# Patient Record
Sex: FEMALE | Race: WHITE | Employment: OTHER | URBAN - NONMETROPOLITAN AREA
[De-identification: names, ages, dates, MRNs, and addresses within clinical notes are randomized per-mention and may not be internally consistent; named-entity substitution may affect disease eponyms.]

---

## 2017-01-17 ENCOUNTER — OFFICE VISIT (OUTPATIENT)
Dept: FAMILY MEDICINE CLINIC | Age: 72
End: 2017-01-17

## 2017-01-17 VITALS
SYSTOLIC BLOOD PRESSURE: 126 MMHG | HEART RATE: 56 BPM | OXYGEN SATURATION: 97 % | WEIGHT: 247 LBS | DIASTOLIC BLOOD PRESSURE: 82 MMHG | TEMPERATURE: 98 F | HEIGHT: 65 IN | BODY MASS INDEX: 41.15 KG/M2

## 2017-01-17 DIAGNOSIS — R25.1 TREMOR, UNSPECIFIED: ICD-10-CM

## 2017-01-17 DIAGNOSIS — E66.01 MORBID OBESITY WITH BMI OF 40.0-44.9, ADULT (HCC): ICD-10-CM

## 2017-01-17 DIAGNOSIS — M79.602 LEFT ARM PAIN: Primary | ICD-10-CM

## 2017-01-17 PROCEDURE — 4040F PNEUMOC VAC/ADMIN/RCVD: CPT | Performed by: FAMILY MEDICINE

## 2017-01-17 PROCEDURE — G8484 FLU IMMUNIZE NO ADMIN: HCPCS | Performed by: FAMILY MEDICINE

## 2017-01-17 PROCEDURE — G8427 DOCREV CUR MEDS BY ELIG CLIN: HCPCS | Performed by: FAMILY MEDICINE

## 2017-01-17 PROCEDURE — 3014F SCREEN MAMMO DOC REV: CPT | Performed by: FAMILY MEDICINE

## 2017-01-17 PROCEDURE — 1090F PRES/ABSN URINE INCON ASSESS: CPT | Performed by: FAMILY MEDICINE

## 2017-01-17 PROCEDURE — 1123F ACP DISCUSS/DSCN MKR DOCD: CPT | Performed by: FAMILY MEDICINE

## 2017-01-17 PROCEDURE — G8400 PT W/DXA NO RESULTS DOC: HCPCS | Performed by: FAMILY MEDICINE

## 2017-01-17 PROCEDURE — 93000 ELECTROCARDIOGRAM COMPLETE: CPT | Performed by: FAMILY MEDICINE

## 2017-01-17 PROCEDURE — G8419 CALC BMI OUT NRM PARAM NOF/U: HCPCS | Performed by: FAMILY MEDICINE

## 2017-01-17 PROCEDURE — 3017F COLORECTAL CA SCREEN DOC REV: CPT | Performed by: FAMILY MEDICINE

## 2017-01-17 PROCEDURE — 1036F TOBACCO NON-USER: CPT | Performed by: FAMILY MEDICINE

## 2017-01-17 PROCEDURE — 99214 OFFICE O/P EST MOD 30 MIN: CPT | Performed by: FAMILY MEDICINE

## 2017-01-17 ASSESSMENT — ENCOUNTER SYMPTOMS
CONSTIPATION: 0
COUGH: 0
WHEEZING: 0
BACK PAIN: 0
ABDOMINAL PAIN: 0
BLOOD IN STOOL: 0
NAUSEA: 0
VOMITING: 0
EYE PAIN: 0
CHEST TIGHTNESS: 0
RHINORRHEA: 0
SHORTNESS OF BREATH: 0
DIARRHEA: 0
SORE THROAT: 0

## 2017-03-08 ENCOUNTER — OFFICE VISIT (OUTPATIENT)
Dept: FAMILY MEDICINE CLINIC | Age: 72
End: 2017-03-08

## 2017-03-08 VITALS
HEART RATE: 64 BPM | SYSTOLIC BLOOD PRESSURE: 134 MMHG | OXYGEN SATURATION: 96 % | BODY MASS INDEX: 41.42 KG/M2 | DIASTOLIC BLOOD PRESSURE: 86 MMHG | WEIGHT: 248.6 LBS | HEIGHT: 65 IN | RESPIRATION RATE: 12 BRPM

## 2017-03-08 DIAGNOSIS — R20.2 NUMBNESS AND TINGLING OF LEFT LEG: ICD-10-CM

## 2017-03-08 DIAGNOSIS — M15.9 GENERALIZED OA: ICD-10-CM

## 2017-03-08 DIAGNOSIS — R20.2 NUMBNESS AND TINGLING IN RIGHT HAND: Primary | ICD-10-CM

## 2017-03-08 DIAGNOSIS — R20.0 NUMBNESS AND TINGLING IN RIGHT HAND: Primary | ICD-10-CM

## 2017-03-08 DIAGNOSIS — R20.0 NUMBNESS AND TINGLING OF LEFT LEG: ICD-10-CM

## 2017-03-08 DIAGNOSIS — Z12.11 SCREEN FOR COLON CANCER: ICD-10-CM

## 2017-03-08 DIAGNOSIS — E66.01 MORBID OBESITY WITH BMI OF 40.0-44.9, ADULT (HCC): ICD-10-CM

## 2017-03-08 PROCEDURE — 1090F PRES/ABSN URINE INCON ASSESS: CPT | Performed by: FAMILY MEDICINE

## 2017-03-08 PROCEDURE — 3017F COLORECTAL CA SCREEN DOC REV: CPT | Performed by: FAMILY MEDICINE

## 2017-03-08 PROCEDURE — 99214 OFFICE O/P EST MOD 30 MIN: CPT | Performed by: FAMILY MEDICINE

## 2017-03-08 PROCEDURE — 4040F PNEUMOC VAC/ADMIN/RCVD: CPT | Performed by: FAMILY MEDICINE

## 2017-03-08 PROCEDURE — 3014F SCREEN MAMMO DOC REV: CPT | Performed by: FAMILY MEDICINE

## 2017-03-08 PROCEDURE — 1123F ACP DISCUSS/DSCN MKR DOCD: CPT | Performed by: FAMILY MEDICINE

## 2017-03-08 PROCEDURE — G8484 FLU IMMUNIZE NO ADMIN: HCPCS | Performed by: FAMILY MEDICINE

## 2017-03-08 PROCEDURE — 1036F TOBACCO NON-USER: CPT | Performed by: FAMILY MEDICINE

## 2017-03-08 PROCEDURE — G8400 PT W/DXA NO RESULTS DOC: HCPCS | Performed by: FAMILY MEDICINE

## 2017-03-08 PROCEDURE — G8427 DOCREV CUR MEDS BY ELIG CLIN: HCPCS | Performed by: FAMILY MEDICINE

## 2017-03-08 PROCEDURE — G8417 CALC BMI ABV UP PARAM F/U: HCPCS | Performed by: FAMILY MEDICINE

## 2017-03-08 RX ORDER — MELOXICAM 15 MG/1
15 TABLET ORAL DAILY
Qty: 30 TABLET | Refills: 2 | Status: SHIPPED | OUTPATIENT
Start: 2017-03-08 | End: 2017-06-15 | Stop reason: SDUPTHER

## 2017-03-08 ASSESSMENT — ENCOUNTER SYMPTOMS
WHEEZING: 0
DIARRHEA: 0
SHORTNESS OF BREATH: 0
VOMITING: 0
CONSTIPATION: 0
CHEST TIGHTNESS: 0
RHINORRHEA: 0
BACK PAIN: 0
BLOOD IN STOOL: 0
ABDOMINAL PAIN: 0
COUGH: 0
SORE THROAT: 0
NAUSEA: 0
EYE PAIN: 0

## 2017-03-09 ENCOUNTER — TELEPHONE (OUTPATIENT)
Dept: FAMILY MEDICINE CLINIC | Age: 72
End: 2017-03-09

## 2017-03-09 ENCOUNTER — NURSE ONLY (OUTPATIENT)
Dept: FAMILY MEDICINE CLINIC | Age: 72
End: 2017-03-09

## 2017-03-09 DIAGNOSIS — Z12.11 SCREEN FOR COLON CANCER: ICD-10-CM

## 2017-03-09 DIAGNOSIS — Z12.11 SCREENING FOR COLON CANCER: Primary | ICD-10-CM

## 2017-03-09 LAB
ABSOLUTE BASO #: 0.1 K/UL (ref 0–0.1)
ABSOLUTE EOS #: 0.1 K/UL (ref 0.1–0.4)
ABSOLUTE LYMPH #: 2.7 K/UL (ref 0.8–5.2)
ABSOLUTE MONO #: 0.5 K/UL (ref 0.1–0.9)
ABSOLUTE NEUT #: 3.4 K/UL (ref 1.3–9.1)
ALBUMIN SERPL-MCNC: 4.1 G/DL (ref 3.2–5.3)
ALK PHOSPHATASE: 101 IU/L (ref 35–121)
ALT SERPL-CCNC: 21 IU/L (ref 5–59)
ANION GAP SERPL CALCULATED.3IONS-SCNC: 13 MMOL/L
AST SERPL-CCNC: 18 IU/L (ref 10–42)
BASOPHILS RELATIVE PERCENT: 0.7 % (ref 0–1)
BILIRUB SERPL-MCNC: 0.4 MG/DL (ref 0.2–1.3)
BUN BLDV-MCNC: 11 MG/DL (ref 10–20)
CALCIUM SERPL-MCNC: 10.6 MG/DL (ref 8.7–10.8)
CHLORIDE BLD-SCNC: 106 MMOL/L (ref 95–111)
CO2: 25 MMOL/L (ref 21–32)
CONTROL: POSITIVE
CREAT SERPL-MCNC: 0.7 MG/DL (ref 0.5–1.3)
EGFR AFRICAN AMERICAN: 100
EGFR IF NONAFRICAN AMERICAN: 83
EOSINOPHILS RELATIVE PERCENT: 1.3 % (ref 1–4)
FOLATE: 12.24 NG/ML
GLUCOSE: 83 MG/DL (ref 70–100)
HCT VFR BLD CALC: 40.5 % (ref 36–48)
HEMOCCULT STL QL: NEGATIVE
HEMOGLOBIN: 13.7 G/DL (ref 12–16)
LYMPHOCYTE %: 40.3 % (ref 16–48)
MCH RBC QN AUTO: 30.3 PG (ref 27–34)
MCHC RBC AUTO-ENTMCNC: 33.8 G/DL (ref 31–36)
MCV RBC AUTO: 89.6 FL (ref 80–100)
MONOCYTES # BLD: 7.1 % (ref 1–8)
NEUTROPHILS RELATIVE PERCENT: 50.3 % (ref 45–75)
PDW BLD-RTO: 12.3 % (ref 10.8–14.8)
PLATELETS: 211 K/UL (ref 150–450)
POTASSIUM SERPL-SCNC: 4.8 MMOL/L (ref 3.5–5.4)
RBC: 4.52 M/UL (ref 4–5.5)
SEDIMENTATION RATE, ERYTHROCYTE: 13 MM/HR (ref 0–30)
SODIUM BLD-SCNC: 139 MMOL/L (ref 134–147)
TOTAL PROTEIN: 6.3 G/DL (ref 5.8–8)
TSH SERPL DL<=0.05 MIU/L-ACNC: 1.96 UIU/ML (ref 0.4–4.4)
VITAMIN B-12: 220 PG/ML (ref 211–911)
WBC: 6.8 K/UL (ref 3.7–10.8)

## 2017-03-09 PROCEDURE — 82274 ASSAY TEST FOR BLOOD FECAL: CPT | Performed by: FAMILY MEDICINE

## 2017-06-15 DIAGNOSIS — M15.9 GENERALIZED OA: ICD-10-CM

## 2017-06-15 DIAGNOSIS — R20.2 NUMBNESS AND TINGLING OF LEFT LEG: ICD-10-CM

## 2017-06-15 DIAGNOSIS — R20.2 NUMBNESS AND TINGLING IN RIGHT HAND: ICD-10-CM

## 2017-06-15 DIAGNOSIS — R20.0 NUMBNESS AND TINGLING IN RIGHT HAND: ICD-10-CM

## 2017-06-15 DIAGNOSIS — R20.0 NUMBNESS AND TINGLING OF LEFT LEG: ICD-10-CM

## 2017-06-15 RX ORDER — MELOXICAM 15 MG/1
15 TABLET ORAL DAILY
Qty: 90 TABLET | Refills: 2 | Status: SHIPPED | OUTPATIENT
Start: 2017-06-15 | End: 2017-12-28 | Stop reason: SDUPTHER

## 2017-12-28 ENCOUNTER — OFFICE VISIT (OUTPATIENT)
Dept: FAMILY MEDICINE CLINIC | Age: 72
End: 2017-12-28
Payer: MEDICARE

## 2017-12-28 VITALS
WEIGHT: 252 LBS | HEART RATE: 64 BPM | BODY MASS INDEX: 41.99 KG/M2 | SYSTOLIC BLOOD PRESSURE: 134 MMHG | DIASTOLIC BLOOD PRESSURE: 80 MMHG | RESPIRATION RATE: 12 BRPM | HEIGHT: 65 IN

## 2017-12-28 DIAGNOSIS — R20.0 NUMBNESS AND TINGLING IN RIGHT HAND: ICD-10-CM

## 2017-12-28 DIAGNOSIS — R20.2 NUMBNESS AND TINGLING OF LEFT LEG: ICD-10-CM

## 2017-12-28 DIAGNOSIS — R20.2 NUMBNESS AND TINGLING IN RIGHT HAND: ICD-10-CM

## 2017-12-28 DIAGNOSIS — M15.9 GENERALIZED OA: ICD-10-CM

## 2017-12-28 DIAGNOSIS — R20.0 NUMBNESS AND TINGLING OF LEFT LEG: ICD-10-CM

## 2017-12-28 DIAGNOSIS — M17.0 BILATERAL PRIMARY OSTEOARTHRITIS OF KNEE: Primary | ICD-10-CM

## 2017-12-28 PROCEDURE — G8417 CALC BMI ABV UP PARAM F/U: HCPCS | Performed by: FAMILY MEDICINE

## 2017-12-28 PROCEDURE — 1036F TOBACCO NON-USER: CPT | Performed by: FAMILY MEDICINE

## 2017-12-28 PROCEDURE — 99213 OFFICE O/P EST LOW 20 MIN: CPT | Performed by: FAMILY MEDICINE

## 2017-12-28 PROCEDURE — 3017F COLORECTAL CA SCREEN DOC REV: CPT | Performed by: FAMILY MEDICINE

## 2017-12-28 PROCEDURE — 1123F ACP DISCUSS/DSCN MKR DOCD: CPT | Performed by: FAMILY MEDICINE

## 2017-12-28 PROCEDURE — 1090F PRES/ABSN URINE INCON ASSESS: CPT | Performed by: FAMILY MEDICINE

## 2017-12-28 PROCEDURE — 3014F SCREEN MAMMO DOC REV: CPT | Performed by: FAMILY MEDICINE

## 2017-12-28 PROCEDURE — G8427 DOCREV CUR MEDS BY ELIG CLIN: HCPCS | Performed by: FAMILY MEDICINE

## 2017-12-28 PROCEDURE — 4040F PNEUMOC VAC/ADMIN/RCVD: CPT | Performed by: FAMILY MEDICINE

## 2017-12-28 PROCEDURE — G8400 PT W/DXA NO RESULTS DOC: HCPCS | Performed by: FAMILY MEDICINE

## 2017-12-28 PROCEDURE — G8484 FLU IMMUNIZE NO ADMIN: HCPCS | Performed by: FAMILY MEDICINE

## 2017-12-28 RX ORDER — MELOXICAM 15 MG/1
15 TABLET ORAL DAILY
Qty: 90 TABLET | Refills: 3 | Status: SHIPPED | OUTPATIENT
Start: 2017-12-28 | End: 2020-01-16

## 2017-12-28 ASSESSMENT — ENCOUNTER SYMPTOMS
EYE PAIN: 0
VOMITING: 0
COUGH: 0
CHEST TIGHTNESS: 0
WHEEZING: 0
RHINORRHEA: 0
BLOOD IN STOOL: 0
BACK PAIN: 0
SHORTNESS OF BREATH: 0
SORE THROAT: 0
CONSTIPATION: 0
ABDOMINAL PAIN: 0
NAUSEA: 0
DIARRHEA: 0

## 2017-12-28 ASSESSMENT — PATIENT HEALTH QUESTIONNAIRE - PHQ9
1. LITTLE INTEREST OR PLEASURE IN DOING THINGS: 0
SUM OF ALL RESPONSES TO PHQ QUESTIONS 1-9: 0
SUM OF ALL RESPONSES TO PHQ9 QUESTIONS 1 & 2: 0
2. FEELING DOWN, DEPRESSED OR HOPELESS: 0

## 2017-12-28 NOTE — PATIENT INSTRUCTIONS
Patient Education        Knee Arthritis: Care Instructions  Your Care Instructions    Knee arthritis is a breakdown of the cartilage that cushions your knee joint. When the cartilage wears down, your bones rub against each other. This causes pain and stiffness. Knee arthritis tends to get worse with time. Treatment for knee arthritis involves reducing pain, making the leg muscles stronger, and staying at a healthy body weight. The treatment usually does not improve the health of the cartilage, but it can reduce pain and improve how well your knee works. You can take simple measures to protect your knee joints, ease your pain, and help you stay active. Follow-up care is a key part of your treatment and safety. Be sure to make and go to all appointments, and call your doctor if you are having problems. It's also a good idea to know your test results and keep a list of the medicines you take. How can you care for yourself at home? · Know that knee arthritis will cause more pain on some days than on others. · Stay at a healthy weight. Lose weight if you are overweight. When you stand up, the pressure on your knees from every pound of body weight is multiplied four times. So if you lose 10 pounds, you will reduce the pressure on your knees by 40 pounds. · Talk to your doctor or physical therapist about exercises that will help ease joint pain. ¨ Stretch to help prevent stiffness and to prevent injury before you exercise. You may enjoy gentle forms of yoga to help keep your knee joints and muscles flexible. ¨ Walk instead of jog. ¨ Ride a bike. This makes your thigh muscles stronger and takes pressure off your knee. ¨ Wear well-fitting and comfortable shoes. ¨ Exercise in chest-deep water. This can help you exercise longer with less pain. ¨ Avoid exercises that include squatting or kneeling. They can put a lot of strain on your knees.   ¨ Talk to your doctor to make sure that the exercise you do is not making the arthritis worse. · Do not sit for long periods of time. Try to walk once in a while to keep your knee from getting stiff. · Ask your doctor or physical therapist whether shoe inserts may reduce your arthritis pain. · If you can afford it, get new athletic shoes at least every year. This can help reduce the strain on your knees. · Use a device to help you do everyday activities. ¨ A cane or walking stick can help you keep your balance when you walk. Hold the cane or walking stick in the hand opposite the painful knee. ¨ If you feel like you may fall when you walk, try using crutches or a front-wheeled walker. These can prevent falls that could cause more damage to your knee. ¨ A knee brace may help keep your knee stable and prevent pain. ¨ You also can use other things to make life easier, such as a higher toilet seat and handrails in the bathtub or shower. · Take pain medicines exactly as directed. ¨ Do not wait until you are in severe pain. You will get better results if you take it sooner. ¨ If you are not taking a prescription pain medicine, take an over-the-counter medicine such as acetaminophen (Tylenol), ibuprofen (Advil, Motrin), or naproxen (Aleve). Read and follow all instructions on the label. ¨ Do not take two or more pain medicines at the same time unless the doctor told you to. Many pain medicines have acetaminophen, which is Tylenol. Too much acetaminophen (Tylenol) can be harmful. ¨ Tell your doctor if you take a blood thinner, have diabetes, or have allergies to shellfish. · Ask your doctor if you might benefit from a shot of steroid medicine into your knee. This may provide pain relief for several months. · Many people take the supplements glucosamine and chondroitin for osteoarthritis. Some people feel they help, but the medical research does not show that they work. Talk to your doctor before you take these supplements. When should you call for help?   Call your doctor now or seek immediate medical care if:  ? · You have sudden swelling, warmth, or pain in your knee. ? · You have knee pain and a fever or rash. ? · You have such bad pain that you cannot use your knee. ? Watch closely for changes in your health, and be sure to contact your doctor if you have any problems. Where can you learn more? Go to https://chpepiceweb.[employer] ([employer]). org and sign in to your CubeTree account. Enter J517 in the geolad box to learn more about \"Knee Arthritis: Care Instructions. \"     If you do not have an account, please click on the \"Sign Up Now\" link. Current as of: October 31, 2016  Content Version: 11.4  © 2691-1860 [employer]. Care instructions adapted under license by [email or web address] ([facility]). If you have questions about a medical condition or this instruction, always ask your healthcare professional. [place] [address] any warranty or liability for your use of this information. Patient Education        Knee Arthritis: Exercises  Your Care Instructions  Here are some examples of exercises for knee arthritis. Start each exercise slowly. Ease off the exercise if you start to have pain. Your doctor or physical therapist will tell you when you can start these exercises and which ones will work best for you. How to do the exercises  Knee flexion with heel slide    1. Lie on your back with your knees bent. 2. Slide your heel back by bending your affected knee as far as you can. Then hook your other foot around your ankle to help pull your heel even farther back. 3. Hold for about 6 seconds, then rest for up to 10 seconds. 4. Repeat 8 to 12 times. 5. Switch legs and repeat steps 1 through 4, even if only one knee is sore. Quad sets    1. Sit with your affected leg straight and supported on the floor or a firm bed. Place a small, rolled-up towel under your knee. Your other leg should be bent, with that foot flat on the floor.   2. Tighten the thigh muscles of your affected leg by pressing the back of your knee down into the towel. 3. Hold for about 6 seconds, then rest for up to 10 seconds. 4. Repeat 8 to 12 times. 5. Switch legs and repeat steps 1 through 4, even if only one knee is sore. Straight-leg raises to the front    1. Lie on your back with your good knee bent so that your foot rests flat on the floor. Your affected leg should be straight. Make sure that your low back has a normal curve. You should be able to slip your hand in between the floor and the small of your back, with your palm touching the floor and your back touching the back of your hand. 2. Tighten the thigh muscles in your affected leg by pressing the back of your knee flat down to the floor. Hold your knee straight. 3. Keeping the thigh muscles tight and your leg straight, lift your affected leg up so that your heel is about 12 inches off the floor. Hold for about 6 seconds, then lower slowly. 4. Relax for up to 10 seconds between repetitions. 5. Repeat 8 to 12 times. 6. Switch legs and repeat steps 1 through 5, even if only one knee is sore. Active knee flexion    1. Lie on your stomach with your knees straight. If your kneecap is uncomfortable, roll up a washcloth and put it under your leg just above your kneecap. 2. Lift the foot of your affected leg by bending the knee so that you bring the foot up toward your buttock. If this motion hurts, try it without bending your knee quite as far. This may help you avoid any painful motion. 3. Slowly move your leg up and down. 4. Repeat 8 to 12 times. 5. Switch legs and repeat steps 1 through 4, even if only one knee is sore. Quadriceps stretch (facedown)    1. Lie flat on your stomach, and rest your face on the floor. 2. Wrap a towel or belt strap around the lower part of your affected leg. Then use the towel or belt strap to slowly pull your heel toward your buttock until you feel a stretch.   3. Hold for about 15 to 30 surgery replaces the worn ends of the thighbone (femur) and the lower leg bone (tibia) where they meet at the knee. Your doctor will take out the damaged bone and replace it with plastic and metal parts. These new parts may be attached to your bones with cement. You will need a lot of rehabilitation, or rehab, after surgery. This takes several weeks. But you should be able to start to walk, climb stairs, sit in and get up from chairs, and do other daily movements within a few days. What are gavin points about this decision? · The decision you and your doctor make depends on how much pain and disability you have. It also depends on your age, health, and activity level. · Most people have this surgery only when they can no longer control knee pain with other treatments and when the pain disrupts their lives. · Rehab after this surgery means doing daily exercises for several weeks. · Most knee replacements last for at least 15 years. You may need to have the knee replaced again. Why might you choose to replace your knee? · You are not able to do most of your activities without pain. · You have very bad arthritis pain. Other treatments have not helped. · You do not have other health problems that would make surgery too risky. · You are not worried that you may need to have another knee replacement later in life. · You aren't worried about side effects. These may include infections, blood clots, and loss of range of motion. · You are willing to do weeks of rehab. · You want to have the surgery before you lose too much of your ability to be active. If you are not able to stay active, strong, and flexible before the surgery, it can be harder to return to your normal activities after the surgery. Why might you choose not to replace your knee? · You can still do most of your activities. · You have other health problems that may make surgery too risky.   · You want to try all other treatments first.  · You want control knee pain with other treatments and when the pain disrupts their lives. · Rehab after this surgery means doing daily exercises for several weeks. · Most knee replacements last for at least 15 years. You may need to have the knee replaced again. Why might you choose to replace your knee? · You are not able to do most of your activities without pain. · You have very bad arthritis pain. Other treatments have not helped. · You do not have other health problems that would make surgery too risky. · You are not worried that you may need to have another knee replacement later in life. · You aren't worried about side effects. These may include infections, blood clots, and loss of range of motion. · You are willing to do weeks of rehab. · You want to have the surgery before you lose too much of your ability to be active. If you are not able to stay active, strong, and flexible before the surgery, it can be harder to return to your normal activities after the surgery. Why might you choose not to replace your knee? · You can still do most of your activities. · You have other health problems that may make surgery too risky. · You want to try all other treatments first.  · You want to avoid the side effects of surgery. · You can't do rehab after surgery. · You worry that you may need another knee replacement later in life. Your decision  Thinking about the facts and your feelings can help you make a decision that is right for you. Be sure you understand the benefits and risks of your options, and think about what else you need to do before you make the decision. Where can you learn more? Go to https://cipriano.[employer]. org and sign in to your [employer] account. Enter V321 in the Photowhoa box to learn more about \"Deciding About Knee Replacement Surgery. \"     If you do not have an account, please click on the \"Sign Up Now\" link.   Current as of: March 21, 2017  Content Version: 11.4  © 7175-3555 [employer]. Care instructions adapted under license by [facility] ([facility]). If you have questions about a medical condition or this instruction, always ask your healthcare professional. [address] any warranty or liability for your use of this information.

## 2018-01-22 ENCOUNTER — TELEPHONE (OUTPATIENT)
Dept: FAMILY MEDICINE CLINIC | Age: 73
End: 2018-01-22

## 2018-03-02 ENCOUNTER — OFFICE VISIT (OUTPATIENT)
Dept: FAMILY MEDICINE CLINIC | Age: 73
End: 2018-03-02
Payer: MEDICARE

## 2018-03-02 VITALS
BODY MASS INDEX: 44.11 KG/M2 | DIASTOLIC BLOOD PRESSURE: 78 MMHG | OXYGEN SATURATION: 98 % | WEIGHT: 261 LBS | HEART RATE: 60 BPM | TEMPERATURE: 98.1 F | SYSTOLIC BLOOD PRESSURE: 110 MMHG | RESPIRATION RATE: 12 BRPM

## 2018-03-02 DIAGNOSIS — E66.01 MORBID OBESITY WITH BMI OF 40.0-44.9, ADULT (HCC): ICD-10-CM

## 2018-03-02 DIAGNOSIS — M17.0 PRIMARY OSTEOARTHRITIS OF BOTH KNEES: ICD-10-CM

## 2018-03-02 DIAGNOSIS — R10.13 EPIGASTRIC ABDOMINAL PAIN: Primary | ICD-10-CM

## 2018-03-02 PROCEDURE — 99214 OFFICE O/P EST MOD 30 MIN: CPT | Performed by: FAMILY MEDICINE

## 2018-03-02 PROCEDURE — 4040F PNEUMOC VAC/ADMIN/RCVD: CPT | Performed by: FAMILY MEDICINE

## 2018-03-02 PROCEDURE — 1036F TOBACCO NON-USER: CPT | Performed by: FAMILY MEDICINE

## 2018-03-02 PROCEDURE — 1123F ACP DISCUSS/DSCN MKR DOCD: CPT | Performed by: FAMILY MEDICINE

## 2018-03-02 PROCEDURE — 1090F PRES/ABSN URINE INCON ASSESS: CPT | Performed by: FAMILY MEDICINE

## 2018-03-02 PROCEDURE — G8417 CALC BMI ABV UP PARAM F/U: HCPCS | Performed by: FAMILY MEDICINE

## 2018-03-02 PROCEDURE — 3014F SCREEN MAMMO DOC REV: CPT | Performed by: FAMILY MEDICINE

## 2018-03-02 PROCEDURE — G8427 DOCREV CUR MEDS BY ELIG CLIN: HCPCS | Performed by: FAMILY MEDICINE

## 2018-03-02 PROCEDURE — G8484 FLU IMMUNIZE NO ADMIN: HCPCS | Performed by: FAMILY MEDICINE

## 2018-03-02 PROCEDURE — G8400 PT W/DXA NO RESULTS DOC: HCPCS | Performed by: FAMILY MEDICINE

## 2018-03-02 PROCEDURE — 3017F COLORECTAL CA SCREEN DOC REV: CPT | Performed by: FAMILY MEDICINE

## 2018-03-02 RX ORDER — ESOMEPRAZOLE MAGNESIUM 40 MG/1
40 CAPSULE, DELAYED RELEASE ORAL
Qty: 30 CAPSULE | Refills: 3 | Status: SHIPPED | OUTPATIENT
Start: 2018-03-02 | End: 2018-12-27 | Stop reason: ALTCHOICE

## 2018-03-02 ASSESSMENT — ENCOUNTER SYMPTOMS
BLOOD IN STOOL: 0
BACK PAIN: 0
DIARRHEA: 0
RHINORRHEA: 0
CHEST TIGHTNESS: 0
SORE THROAT: 0
WHEEZING: 0
SHORTNESS OF BREATH: 0
CONSTIPATION: 0
COUGH: 0
NAUSEA: 0
EYE PAIN: 0
ABDOMINAL PAIN: 1
VOMITING: 0

## 2018-03-02 NOTE — PATIENT INSTRUCTIONS
pain.  · If you can afford it, get new athletic shoes at least every year. This can help reduce the strain on your knees. · Use a device to help you do everyday activities. ¨ A cane or walking stick can help you keep your balance when you walk. Hold the cane or walking stick in the hand opposite the painful knee. ¨ If you feel like you may fall when you walk, try using crutches or a front-wheeled walker. These can prevent falls that could cause more damage to your knee. ¨ A knee brace may help keep your knee stable and prevent pain. ¨ You also can use other things to make life easier, such as a higher toilet seat and handrails in the bathtub or shower. · Take pain medicines exactly as directed. ¨ Do not wait until you are in severe pain. You will get better results if you take it sooner. ¨ If you are not taking a prescription pain medicine, take an over-the-counter medicine such as acetaminophen (Tylenol), ibuprofen (Advil, Motrin), or naproxen (Aleve). Read and follow all instructions on the label. ¨ Do not take two or more pain medicines at the same time unless the doctor told you to. Many pain medicines have acetaminophen, which is Tylenol. Too much acetaminophen (Tylenol) can be harmful. ¨ Tell your doctor if you take a blood thinner, have diabetes, or have allergies to shellfish. · Ask your doctor if you might benefit from a shot of steroid medicine into your knee. This may provide pain relief for several months. · Many people take the supplements glucosamine and chondroitin for osteoarthritis. Some people feel they help, but the medical research does not show that they work. Talk to your doctor before you take these supplements. When should you call for help? Call your doctor now or seek immediate medical care if:  ? · You have sudden swelling, warmth, or pain in your knee. ? · You have knee pain and a fever or rash. ? · You have such bad pain that you cannot use your knee. ? Watch closely

## 2018-12-27 ENCOUNTER — OFFICE VISIT (OUTPATIENT)
Dept: FAMILY MEDICINE CLINIC | Age: 73
End: 2018-12-27
Payer: MEDICARE

## 2018-12-27 VITALS
RESPIRATION RATE: 12 BRPM | HEART RATE: 60 BPM | DIASTOLIC BLOOD PRESSURE: 88 MMHG | HEIGHT: 65 IN | BODY MASS INDEX: 41.38 KG/M2 | WEIGHT: 248.4 LBS | SYSTOLIC BLOOD PRESSURE: 130 MMHG

## 2018-12-27 DIAGNOSIS — E66.01 MORBID OBESITY WITH BMI OF 40.0-44.9, ADULT (HCC): ICD-10-CM

## 2018-12-27 DIAGNOSIS — M17.11 PRIMARY OSTEOARTHRITIS OF RIGHT KNEE: ICD-10-CM

## 2018-12-27 DIAGNOSIS — Z01.818 PRE-OP EXAMINATION: Primary | ICD-10-CM

## 2018-12-27 PROCEDURE — 4040F PNEUMOC VAC/ADMIN/RCVD: CPT | Performed by: FAMILY MEDICINE

## 2018-12-27 PROCEDURE — 1123F ACP DISCUSS/DSCN MKR DOCD: CPT | Performed by: FAMILY MEDICINE

## 2018-12-27 PROCEDURE — G8417 CALC BMI ABV UP PARAM F/U: HCPCS | Performed by: FAMILY MEDICINE

## 2018-12-27 PROCEDURE — 1101F PT FALLS ASSESS-DOCD LE1/YR: CPT | Performed by: FAMILY MEDICINE

## 2018-12-27 PROCEDURE — G8484 FLU IMMUNIZE NO ADMIN: HCPCS | Performed by: FAMILY MEDICINE

## 2018-12-27 PROCEDURE — G8400 PT W/DXA NO RESULTS DOC: HCPCS | Performed by: FAMILY MEDICINE

## 2018-12-27 PROCEDURE — 1090F PRES/ABSN URINE INCON ASSESS: CPT | Performed by: FAMILY MEDICINE

## 2018-12-27 PROCEDURE — 99214 OFFICE O/P EST MOD 30 MIN: CPT | Performed by: FAMILY MEDICINE

## 2018-12-27 PROCEDURE — 3017F COLORECTAL CA SCREEN DOC REV: CPT | Performed by: FAMILY MEDICINE

## 2018-12-27 PROCEDURE — G8427 DOCREV CUR MEDS BY ELIG CLIN: HCPCS | Performed by: FAMILY MEDICINE

## 2018-12-27 PROCEDURE — 1036F TOBACCO NON-USER: CPT | Performed by: FAMILY MEDICINE

## 2018-12-27 ASSESSMENT — ENCOUNTER SYMPTOMS
WHEEZING: 0
BLOOD IN STOOL: 0
RHINORRHEA: 0
NAUSEA: 0
VOMITING: 0
SHORTNESS OF BREATH: 0
BACK PAIN: 0
DIARRHEA: 0
ABDOMINAL PAIN: 0
CHEST TIGHTNESS: 0
SORE THROAT: 0
EYE PAIN: 0
COUGH: 0
CONSTIPATION: 0

## 2018-12-27 ASSESSMENT — PATIENT HEALTH QUESTIONNAIRE - PHQ9
SUM OF ALL RESPONSES TO PHQ QUESTIONS 1-9: 0
2. FEELING DOWN, DEPRESSED OR HOPELESS: 0
1. LITTLE INTEREST OR PLEASURE IN DOING THINGS: 0
SUM OF ALL RESPONSES TO PHQ QUESTIONS 1-9: 0
SUM OF ALL RESPONSES TO PHQ9 QUESTIONS 1 & 2: 0

## 2018-12-27 NOTE — PROGRESS NOTES
Allergies    Current Outpatient Prescriptions:     meloxicam (MOBIC) 15 MG tablet, Take 1 tablet by mouth daily, Disp: 90 tablet, Rfl: 3  Social History     Social History    Marital status:      Spouse name: N/A    Number of children: N/A    Years of education: N/A     Occupational History    Not on file. Social History Main Topics    Smoking status: Never Smoker    Smokeless tobacco: Never Used    Alcohol use No    Drug use: No    Sexual activity: Yes     Other Topics Concern    Not on file     Social History Narrative    No narrative on file     Family History   Problem Relation Age of Onset    Cancer Father     Mult Sclerosis Sister     Hearing Loss Maternal Grandfather        Objective:   Physical Exam   Constitutional: She is oriented to person, place, and time. She appears well-developed and well-nourished. HENT:   Head: Normocephalic and atraumatic. Right Ear: External ear normal.   Left Ear: External ear normal.   Nose: Nose normal.   Mouth/Throat: Oropharynx is clear and moist.   Eyes: Pupils are equal, round, and reactive to light. EOM are normal.   Neck: Neck supple. Carotid bruit is not present. No thyromegaly present. Cardiovascular: Normal rate, regular rhythm and normal heart sounds. Pulmonary/Chest: Breath sounds normal. She has no wheezes. She has no rales. Abdominal: Soft. Bowel sounds are normal. There is no tenderness. There is no rebound and no guarding. Musculoskeletal: She exhibits no edema. Right knee: She exhibits decreased range of motion. Tenderness found. Lymphadenopathy:     She has no cervical adenopathy. Neurological: She is alert and oriented to person, place, and time. She has normal reflexes. No cranial nerve deficit. Skin: Skin is warm and dry. No rash noted. Psychiatric: She has a normal mood and affect. Nursing note and vitals reviewed.     Vitals:    12/27/18 1506   BP: 130/88   Site: Left Upper Arm   Position: Sitting

## 2018-12-27 NOTE — PATIENT INSTRUCTIONS
control knee pain with other treatments and when the pain disrupts their lives. · Rehab after this surgery means doing daily exercises for several weeks. · Most knee replacements last for at least 15 years. You may need to have the knee replaced again. Why might you choose to replace your knee? · You are not able to do most of your activities without pain. · You have very bad arthritis pain. Other treatments have not helped. · You do not have other health problems that would make surgery too risky. · You are not worried that you may need to have another knee replacement later in life. · You aren't worried about side effects. These may include infections, blood clots, and loss of range of motion. · You are willing to do weeks of rehab. · You want to have the surgery before you lose too much of your ability to be active. If you are not able to stay active, strong, and flexible before the surgery, it can be harder to return to your normal activities after the surgery. Why might you choose not to replace your knee? · You can still do most of your activities. · You have other health problems that may make surgery too risky. · You want to try all other treatments first.  · You want to avoid the side effects of surgery. · You can't do rehab after surgery. · You worry that you may need another knee replacement later in life. Your decision  Thinking about the facts and your feelings can help you make a decision that is right for you. Be sure you understand the benefits and risks of your options, and think about what else you need to do before you make the decision. Where can you learn more? Go to [email or web address]://cipriano.[email or web address] org and sign in to your T[employer] account. Enter F577 in the Whi box to learn more about \"Deciding About Knee Replacement Surgery. \"     If you do not have an account, please click on the \"Sign Up Now\" link.   Current as of: November 29, 2017  Content Version: tomatoes, squash, spinach, broccoli, carrots, cauliflower, and onions. ? Have a variety of cut-up vegetables with a low-fat dip as an appetizer instead of chips and dip. ? Sprinkle sunflower seeds or chopped almonds over salads. Or try adding chopped walnuts or almonds to cooked vegetables. ? Try some vegetarian meals using beans and peas. Add garbanzo or kidney beans to salads. Make burritos and tacos with mashed oliva beans or black beans. Where can you learn more? Go to https://Mobi Techpepiceweb.KnowledgeTree. org and sign in to your [email or web address] account. Enter I805 in the [employer] box to learn more about \"DASH Diet: Care Instructions. \"     If you do not have an account, please click on the \"Sign Up Now\" link. Current as of: December 6, 2017  Content Version: 11.8  © 4306-7419 Healthwise, Incorporated. Care instructions adapted under license by [facility] ([facility]). If you have questions about a medical condition or this instruction, always ask your healthcare professional. [place] [address] any warranty or liability for your use of this information.

## 2019-01-08 ENCOUNTER — HOSPITAL ENCOUNTER (OUTPATIENT)
Age: 74
Discharge: HOME | End: 2019-01-08
Payer: MEDICARE

## 2019-01-08 LAB
ABO: NORMAL
ANTIBODY SCREEN: NORMAL
RH FACTOR: NORMAL

## 2019-01-08 PROCEDURE — 36415 COLL VENOUS BLD VENIPUNCTURE: CPT

## 2019-01-08 PROCEDURE — 86901 BLOOD TYPING SEROLOGIC RH(D): CPT

## 2019-01-08 PROCEDURE — 86850 RBC ANTIBODY SCREEN: CPT

## 2019-01-08 PROCEDURE — 86900 BLOOD TYPING SEROLOGIC ABO: CPT

## 2019-03-26 ENCOUNTER — OFFICE VISIT (OUTPATIENT)
Dept: FAMILY MEDICINE CLINIC | Age: 74
End: 2019-03-26
Payer: MEDICARE

## 2019-03-26 VITALS
SYSTOLIC BLOOD PRESSURE: 128 MMHG | RESPIRATION RATE: 20 BRPM | WEIGHT: 246.2 LBS | TEMPERATURE: 98.4 F | DIASTOLIC BLOOD PRESSURE: 86 MMHG | BODY MASS INDEX: 41.61 KG/M2 | OXYGEN SATURATION: 97 % | HEART RATE: 94 BPM

## 2019-03-26 DIAGNOSIS — R50.9 FEVER, UNSPECIFIED FEVER CAUSE: ICD-10-CM

## 2019-03-26 DIAGNOSIS — J20.9 ACUTE BRONCHITIS, UNSPECIFIED ORGANISM: Primary | ICD-10-CM

## 2019-03-26 DIAGNOSIS — E66.01 MORBID OBESITY WITH BMI OF 40.0-44.9, ADULT (HCC): ICD-10-CM

## 2019-03-26 PROCEDURE — G8427 DOCREV CUR MEDS BY ELIG CLIN: HCPCS | Performed by: FAMILY MEDICINE

## 2019-03-26 PROCEDURE — 3017F COLORECTAL CA SCREEN DOC REV: CPT | Performed by: FAMILY MEDICINE

## 2019-03-26 PROCEDURE — G8417 CALC BMI ABV UP PARAM F/U: HCPCS | Performed by: FAMILY MEDICINE

## 2019-03-26 PROCEDURE — G8484 FLU IMMUNIZE NO ADMIN: HCPCS | Performed by: FAMILY MEDICINE

## 2019-03-26 PROCEDURE — 1036F TOBACCO NON-USER: CPT | Performed by: FAMILY MEDICINE

## 2019-03-26 PROCEDURE — 1090F PRES/ABSN URINE INCON ASSESS: CPT | Performed by: FAMILY MEDICINE

## 2019-03-26 PROCEDURE — 99213 OFFICE O/P EST LOW 20 MIN: CPT | Performed by: FAMILY MEDICINE

## 2019-03-26 PROCEDURE — G8400 PT W/DXA NO RESULTS DOC: HCPCS | Performed by: FAMILY MEDICINE

## 2019-03-26 PROCEDURE — 4040F PNEUMOC VAC/ADMIN/RCVD: CPT | Performed by: FAMILY MEDICINE

## 2019-03-26 PROCEDURE — 1123F ACP DISCUSS/DSCN MKR DOCD: CPT | Performed by: FAMILY MEDICINE

## 2019-03-26 PROCEDURE — 87804 INFLUENZA ASSAY W/OPTIC: CPT | Performed by: FAMILY MEDICINE

## 2019-03-26 RX ORDER — SULFAMETHOXAZOLE AND TRIMETHOPRIM 800; 160 MG/1; MG/1
1 TABLET ORAL 2 TIMES DAILY
Qty: 20 TABLET | Refills: 0 | Status: SHIPPED | OUTPATIENT
Start: 2019-03-26 | End: 2019-04-05

## 2019-03-26 ASSESSMENT — ENCOUNTER SYMPTOMS
NAUSEA: 0
CHEST TIGHTNESS: 0
COUGH: 1
SORE THROAT: 1
RHINORRHEA: 1
ABDOMINAL PAIN: 0
BACK PAIN: 0
BLOOD IN STOOL: 0
VOMITING: 0
EYE PAIN: 0
HEMOPTYSIS: 0
SHORTNESS OF BREATH: 0
DIARRHEA: 0
WHEEZING: 0
CONSTIPATION: 0
HEARTBURN: 0

## 2019-09-26 ENCOUNTER — TELEPHONE (OUTPATIENT)
Dept: FAMILY MEDICINE CLINIC | Age: 74
End: 2019-09-26

## 2019-10-11 ENCOUNTER — HOSPITAL ENCOUNTER (OUTPATIENT)
Dept: WOMENS IMAGING | Age: 74
Discharge: HOME | End: 2019-10-11
Payer: MEDICARE

## 2019-10-11 DIAGNOSIS — Z12.31 VISIT FOR SCREENING MAMMOGRAM: ICD-10-CM

## 2019-10-11 PROCEDURE — 77063 BREAST TOMOSYNTHESIS BI: CPT

## 2020-01-16 ENCOUNTER — OFFICE VISIT (OUTPATIENT)
Dept: FAMILY MEDICINE CLINIC | Age: 75
End: 2020-01-16
Payer: MEDICARE

## 2020-01-16 VITALS
WEIGHT: 257.2 LBS | RESPIRATION RATE: 14 BRPM | DIASTOLIC BLOOD PRESSURE: 84 MMHG | BODY MASS INDEX: 42.85 KG/M2 | HEIGHT: 65 IN | HEART RATE: 84 BPM | SYSTOLIC BLOOD PRESSURE: 130 MMHG

## 2020-01-16 PROBLEM — Z90.710 HISTORY OF TOTAL HYSTERECTOMY WITH BILATERAL SALPINGO-OOPHORECTOMY (BSO): Status: ACTIVE | Noted: 2018-04-18

## 2020-01-16 PROBLEM — Z96.659 HISTORY OF TOTAL KNEE ARTHROPLASTY: Status: ACTIVE | Noted: 2020-01-16

## 2020-01-16 PROBLEM — C56.1: Status: ACTIVE | Noted: 2018-04-03

## 2020-01-16 PROBLEM — Z90.79 HISTORY OF TOTAL HYSTERECTOMY WITH BILATERAL SALPINGO-OOPHORECTOMY (BSO): Status: ACTIVE | Noted: 2018-04-18

## 2020-01-16 PROBLEM — E66.01 OBESITY, MORBID, BMI 40.0-49.9 (HCC): Status: ACTIVE | Noted: 2018-04-19

## 2020-01-16 PROBLEM — Z90.722 HISTORY OF TOTAL HYSTERECTOMY WITH BILATERAL SALPINGO-OOPHORECTOMY (BSO): Status: ACTIVE | Noted: 2018-04-18

## 2020-01-16 PROBLEM — S52.509A CLOSED FRACTURE OF DISTAL END OF RADIUS: Status: ACTIVE | Noted: 2020-01-16

## 2020-01-16 PROCEDURE — G8400 PT W/DXA NO RESULTS DOC: HCPCS | Performed by: FAMILY MEDICINE

## 2020-01-16 PROCEDURE — 1036F TOBACCO NON-USER: CPT | Performed by: FAMILY MEDICINE

## 2020-01-16 PROCEDURE — 1123F ACP DISCUSS/DSCN MKR DOCD: CPT | Performed by: FAMILY MEDICINE

## 2020-01-16 PROCEDURE — 3017F COLORECTAL CA SCREEN DOC REV: CPT | Performed by: FAMILY MEDICINE

## 2020-01-16 PROCEDURE — 99213 OFFICE O/P EST LOW 20 MIN: CPT | Performed by: FAMILY MEDICINE

## 2020-01-16 PROCEDURE — G0438 PPPS, INITIAL VISIT: HCPCS | Performed by: FAMILY MEDICINE

## 2020-01-16 PROCEDURE — G8417 CALC BMI ABV UP PARAM F/U: HCPCS | Performed by: FAMILY MEDICINE

## 2020-01-16 PROCEDURE — 1090F PRES/ABSN URINE INCON ASSESS: CPT | Performed by: FAMILY MEDICINE

## 2020-01-16 PROCEDURE — G8484 FLU IMMUNIZE NO ADMIN: HCPCS | Performed by: FAMILY MEDICINE

## 2020-01-16 PROCEDURE — G8427 DOCREV CUR MEDS BY ELIG CLIN: HCPCS | Performed by: FAMILY MEDICINE

## 2020-01-16 PROCEDURE — 4040F PNEUMOC VAC/ADMIN/RCVD: CPT | Performed by: FAMILY MEDICINE

## 2020-01-16 RX ORDER — CELECOXIB 200 MG/1
200 CAPSULE ORAL DAILY
Qty: 90 CAPSULE | Refills: 3 | Status: SHIPPED | OUTPATIENT
Start: 2020-01-16 | End: 2021-02-15 | Stop reason: SDUPTHER

## 2020-01-16 RX ORDER — PRIMIDONE 50 MG/1
50 TABLET ORAL NIGHTLY
Qty: 90 TABLET | Refills: 3 | Status: SHIPPED | OUTPATIENT
Start: 2020-01-16 | End: 2021-02-15 | Stop reason: SDUPTHER

## 2020-01-16 ASSESSMENT — ENCOUNTER SYMPTOMS
COUGH: 0
BLOOD IN STOOL: 0
EYE PAIN: 0
NAUSEA: 0
VOMITING: 0
CONSTIPATION: 0
CHEST TIGHTNESS: 0
WHEEZING: 0
RHINORRHEA: 0
BACK PAIN: 0
SORE THROAT: 0
SHORTNESS OF BREATH: 1
DIARRHEA: 0
ABDOMINAL PAIN: 0

## 2020-01-16 ASSESSMENT — PATIENT HEALTH QUESTIONNAIRE - PHQ9
SUM OF ALL RESPONSES TO PHQ QUESTIONS 1-9: 0
SUM OF ALL RESPONSES TO PHQ QUESTIONS 1-9: 0

## 2020-01-16 ASSESSMENT — LIFESTYLE VARIABLES
HOW OFTEN DO YOU HAVE A DRINK CONTAINING ALCOHOL: 1
HOW MANY STANDARD DRINKS CONTAINING ALCOHOL DO YOU HAVE ON A TYPICAL DAY: 0
HOW OFTEN DO YOU HAVE SIX OR MORE DRINKS ON ONE OCCASION: 0
AUDIT-C TOTAL SCORE: 1

## 2020-01-16 NOTE — PROGRESS NOTES
Subjective:      Patient ID: Tang Guardian is a 69 y.o. female. HPI  Pt here for a check up. Reviewed BMi of 37. Encouraged diet, exercise and weight loss. Reviewed health maintenance. Has known tremors. Is noticing head tremors and hands. Was on primidone in the past ( helpful but sedating). Knee pain, doing injections. Wanting try an arthritis med ( celebrex). Occasional SOB. Clears with rest.  Only happened about 3 times in the last 6 months. , nonsmoker,  pmh reviewed. Review of Systems   Constitutional: Negative for chills, fatigue, fever and unexpected weight change. HENT: Negative for congestion, ear pain, rhinorrhea and sore throat. Eyes: Negative for pain and visual disturbance. Respiratory: Positive for shortness of breath. Negative for cough, chest tightness and wheezing. Cardiovascular: Negative for chest pain and palpitations. Gastrointestinal: Negative for abdominal pain, blood in stool, constipation, diarrhea, nausea and vomiting. Genitourinary: Negative for difficulty urinating, frequency, hematuria and urgency. Musculoskeletal: Negative for back pain, joint swelling, myalgias and neck pain. Knee pain   Skin: Negative for rash. Neurological: Positive for tremors. Negative for dizziness and headaches. Hematological: Negative for adenopathy. Does not bruise/bleed easily. Psychiatric/Behavioral: Negative for behavioral problems and sleep disturbance. The patient is not nervous/anxious. Objective:   Physical Exam  Vitals signs and nursing note reviewed. Constitutional:       Appearance: She is well-developed. HENT:      Head: Normocephalic and atraumatic. Right Ear: External ear normal.      Left Ear: External ear normal.      Nose: Nose normal.      Mouth/Throat:      Mouth: Mucous membranes are moist.   Eyes:      Pupils: Pupils are equal, round, and reactive to light. Neck:      Musculoskeletal: Neck supple.       Thyroid: No cognitive dysfunction are all negative except as indicated below. These results, as well as other patient data from the [address] [facility] [address] form, are documented in Flowsheets linked to this Encounter. Allergies   Allergen Reactions    Latex        Prior to Visit Medications    Not on File       Past Medical History:   Diagnosis Date    Nephrolithiasis     Ovarian cancer ([address] 04/18/2018    Tremors of nervous system        Past Surgical History:   Procedure Laterality Date    CHOLECYSTECTOMY, LAPAROSCOPIC  08/2001    HYSTERECTOMY, TOTAL ABDOMINAL  04/18/2018    KNEE SURGERY  1995    arthroscopic    TONSILLECTOMY  1965       Family History   Problem Relation Age of Onset    Cancer Father     Mult Sclerosis Sister     Hearing Loss Maternal Grandfather        CareTeam (Including outside providers/suppliers regularly involved in providing care):   Patient Care Team:  Sharda Schwarz MD as PCP - General (Family Medicine)  Sharda Schwarz MD as PCP - [facility][1] Empaneled Provider    Wt Readings from Last 3 Encounters:   01/16/20 257 lb 3.2 oz (116.7 kg)   03/26/19 246 lb 3.2 oz (111.7 kg)   12/27/18 248 lb 6.4 oz (112.7 kg)     Vitals:    01/16/20 1002   BP: 130/84   Pulse: 84   Resp: 14   Weight: 257 lb 3.2 oz (116.7 kg)   Height: 5' 4.9\" (1.648 m)     Body mass index is 42.93 kg/m². Based upon direct observation of the patient, evaluation of cognition reveals recent and remote memory intact. Patient's complete Health Risk Assessment and screening values have been reviewed and are found in Flowsheets. The following problems were reviewed today and where indicated follow up appointments were made and/or referrals ordered. Positive Risk Factor Screenings with Interventions:     General Health:  General  In general, how would you say your health is?: Good  In the past 7 days, have you experienced any of the following?  New or Increased Pain, New or Increased Fatigue, Loneliness, Social Isolation, Stress or Anger?: (!) Stress  Do you get the social and emotional support that you need?: Yes  Do you have a Living Will?: (!) No  General Health Risk Interventions:  · Stress: patient's comments regarding reasons for stress and/or anger: taking care of grandkids,  with ptsd, son didn't pay property taxes. · No Living Will: provided the state-specific advance directive document to the patient    Health Habits/Nutrition:  Health Habits/Nutrition  Do you exercise for at least 20 minutes 2-3 times per week?: (!) No  Have you lost any weight without trying in the past 3 months?: No  Do you eat fewer than 2 meals per day?: No  Have you seen a dentist within the past year?: (!) No  Body mass index is 42.93 kg/m². Health Habits/Nutrition Interventions:  · Inadequate physical activity:  educational materials provided to promote increased physical activity  · Dental exam overdue:  patient encouraged to make appointment with his/her dentist    Personalized Preventive Plan   Current Health Maintenance Status    There is no immunization history on file for this patient. Health Maintenance   Topic Date Due    Hepatitis C screen  1945    DTaP/Tdap/Td vaccine (1 - Tdap) 10/06/1958    Lipid screen  10/06/1987    Shingles Vaccine (1 of 2) 10/06/1997    DEXA (modify frequency per FRAX score)  10/06/2012    Pneumococcal 65+ years Vaccine (1 of 1 - PPSV23) 10/06/2012    Colon Cancer Screen FIT/FOBT  03/09/2018    Annual Wellness Visit (AWV)  05/29/2019    Flu vaccine (1) 09/01/2019    Breast cancer screen  10/11/2021     Recommendations for Preventive Services Due: see orders and patient instructions/AVS.  . Recommended screening schedule for the next 5-10 years is provided to the patient in written form: see Patient Instructions/AVS.    There are no diagnoses linked to this encounter. 1. Routine general medical examination at a health care facility      2.  Morbid obesity with BMI of 40.0-44.9, adult [facility])  Encouraged diet, exercise and weight loss.   Dash diet

## 2020-01-16 NOTE — PATIENT INSTRUCTIONS
You may receive a survey about your visit with us today. The feedback from our patients helps us identify what is working well and where the service to all patients can be enhanced. Thank you! "[employer]" allows you to send messages to your doctor, view your test results, renew your prescriptions, schedule appointments, view visit notes, and more. How Do I Sign Up? 1. In your Internet browser, go to [email or web address]  2. Click on the Sign Up Now link in the Sign In box. You will see the New Member Sign Up page. 3. Enter your "[employer]" Access Code exactly as it appears below. You will not need to use this code after youve completed the sign-up process. If you do not sign up before the expiration date, you must request a new code. "[employer]" Access Code: [email or web address]  Expires: 3/1/2020 10:05 AM    4. Enter your Social Security Number ([id number]) and Date of Birth (mm/dd/yyyy) as indicated and click Submit. You will be taken to the next sign-up page. 5. Create a "[employer]" ID. This will be your "[employer]" login ID and cannot be changed, so think of one that is secure and easy to remember. 6. Create a "[employer]" password. You can change your password at any time. 7. Enter your Password Reset Question and Answer. This can be used at a later time if you forget your password. 8. Enter your e-mail address. You will receive e-mail notification when new information is available in [address]. 9. Click Sign Up. You can now view your medical record. Additional Information  If you have questions, please contact the physician practice where you receive care. Remember, "[employer]" is NOT to be used for urgent needs. For medical emergencies, dial 911. For questions regarding your "[employer]" account call [phone]. If you have a clinical question, please call your doctor's office. Personalized Preventive Plan for Scar Izquierdo - 1/16/2020  Medicare offers a range of preventive health benefits.  Some of recommend that you have yearly exams for glaucoma and other age-related eye problems. Hearing. Tell your doctor if you notice any change in your hearing. You can have tests to find out how well you hear. Colon cancer tests. Keep having colon cancer tests as your doctor recommends. You can have one of several types of tests. Heart attack and stroke risk. At least every 4 to 6 years, you should have your risk for heart attack and stroke assessed. Your doctor uses factors such as your age, blood pressure, cholesterol, and whether you smoke or have diabetes to show what your risk for a heart attack or stroke is over the next 10 years. Osteoporosis. Talk to your doctor about whether you should have a bone density test to find out whether you have thinning bones. Also ask your doctor about whether you should take calcium and vitamin D supplements. For women  Pap test and pelvic exam. You may no longer need a Pap test. Talk with your doctor about whether to stop or continue to have Pap tests. Breast exam and mammogram. Ask how often you should have a mammogram, which is an X-ray of your breasts. A mammogram can spot breast cancer before it can be felt and when it is easiest to treat. Thyroid disease. Talk to your doctor about whether to have your thyroid checked as part of a regular physical exam. Women have an increased chance of a thyroid problem. For men  Prostate exam. Talk to your doctor about whether you should have a blood test (called a PSA test) for prostate cancer. Experts recommend that you discuss the benefits and risks of the test with your doctor before you decide whether to have this test. Some experts say that men ages 79 and older no longer need testing. Abdominal aortic aneurysm. Ask your doctor whether you should have a test to check for an aneurysm. You may need a test if you ever smoked or if your parent, brother, sister, or child has had an aneurysm. When should you call for help?   Watch closely for changes in your health, and be sure to contact your doctor if you have any problems or symptoms that concern you. Where can you learn more? Go to https://chpepiceweb.TravelAI. org and sign in to your [employer] account. Enter D024 in the userfox box to learn more about \"Well Visit, Over 65: Care Instructions. \"     If you do not have an account, please click on the \"Sign Up Now\" link. Current as of: August 21, 2019  Content Version: 12.3  © 6826-4128 Healthwise, [employer]. Care instructions adapted under license by [facility] ([facility]). If you have questions about a medical condition or this instruction, always ask your healthcare professional. [address] any warranty or liability for your use of this information. Patient Education        Knee Arthritis: Care Instructions  Your Care Instructions    Knee arthritis is a breakdown of the cartilage that cushions your knee joint. When the cartilage wears down, your bones rub against each other. This causes pain and stiffness. Knee arthritis tends to get worse with time. Treatment for knee arthritis involves reducing pain, making the leg muscles stronger, and staying at a healthy body weight. The treatment usually does not improve the health of the cartilage, but it can reduce pain and improve how well your knee works. You can take simple measures to protect your knee joints, ease your pain, and help you stay active. Follow-up care is a key part of your treatment and safety. Be sure to make and go to all appointments, and call your doctor if you are having problems. It's also a good idea to know your test results and keep a list of the medicines you take. How can you care for yourself at home? Know that knee arthritis will cause more pain on some days than on others. Stay at a healthy weight. Lose weight if you are overweight.  When you stand up, the pressure on your knees from every pound of body weight voice may shake when you speak. This type of tremor is not harmful. It is not caused by a stroke or Parkinson's disease. Some things can affect how much you shake. For example, drinking or eating something with caffeine may make tremors worse for a while. Some medicines also can increase tremors. These include antidepressants and too much thyroid replacement. Talk to your doctor if you think one of your medicines makes your tremors worse. If you are self-conscious about your tremors, there are some things you can do to reduce them or make them less noticeable. This includes taking medicine. Follow-up care is a key part of your treatment and safety. Be sure to make and go to all appointments, and call your doctor if you are having problems. It's also a good idea to know your test results and keep a list of the medicines you take. How can you care for yourself at home? Take your medicines exactly as prescribed. Call your doctor if you think you are having a problem with your medicine. Some medicines that help control tremors have to be taken every day, even if you are not having tremors. You will get more details on the specific medicines your doctor prescribes. Get plenty of rest.  Eat a balanced, healthy diet. Try to reduce stress. Regular exercise and massages may help. Limit alcohol. Heavy drinking can make your tremors worse. Avoid drinks or foods with caffeine if they make your tremors worse. These include tea, cola, coffee, and chocolate. Wear a heavy bracelet or watch. This adds a little weight to your hand. The extra weight may reduce tremors. Drink from cups or glasses that are only half full. You may also want to try drinking with a straw. When should you call for help?   Watch closely for changes in your health, and be sure to contact your doctor if:    You notice your tremors are getting worse.     You can't do your everyday activities because of your tremors.     You are sad and embarrassed about your shaking. Where can you learn more? Go to https://chpepiceweb.vushaper. org and sign in to your [employer] account. Enter B746 in the [facility] box to learn more about \"Benign Essential Tremor: Care Instructions. \"     If you do not have an account, please click on the \"Sign Up Now\" link. Current as of: March 28, 2019  Content Version: 12.3  © 1985-6750 [employer]. Care instructions adapted under license by [facility] ([facility]). If you have questions about a medical condition or this instruction, always ask your healthcare professional. [address] any warranty or liability for your use of this information. Patient Education        DASH Diet: Care Instructions  Your Care Instructions    The DASH diet is an eating plan that can help lower your blood pressure. DASH stands for Dietary Approaches to Stop Hypertension. Hypertension is high blood pressure. The DASH diet focuses on eating foods that are high in calcium, potassium, and magnesium. These nutrients can lower blood pressure. The foods that are highest in these nutrients are fruits, vegetables, low-fat dairy products, nuts, seeds, and legumes. But taking calcium, potassium, and magnesium supplements instead of eating foods that are high in those nutrients does not have the same effect. The DASH diet also includes whole grains, fish, and poultry. The DASH diet is one of several lifestyle changes your doctor may recommend to lower your high blood pressure. Your doctor may also want you to decrease the amount of sodium in your diet. Lowering sodium while following the DASH diet can lower blood pressure even further than just the DASH diet alone. Follow-up care is a key part of your treatment and safety. Be sure to make and go to all appointments, and call your doctor if you are having problems. It's also a good idea to know your test results and keep a list of the medicines you take.   How can you care for yourself at home? Following the DASH diet  Eat 4 to 5 servings of fruit each day. A serving is 1 medium-sized piece of fruit, ½ cup chopped or canned fruit, 1/4 cup dried fruit, or 4 ounces (½ cup) of fruit juice. Choose fruit more often than fruit juice. Eat 4 to 5 servings of vegetables each day. A serving is 1 cup of lettuce or raw leafy vegetables, ½ cup of chopped or cooked vegetables, or 4 ounces (½ cup) of vegetable juice. Choose vegetables more often than vegetable juice. Get 2 to 3 servings of low-fat and fat-free dairy each day. A serving is 8 ounces of milk, 1 cup of yogurt, or 1 ½ ounces of cheese. Eat 6 to 8 servings of grains each day. A serving is 1 slice of bread, 1 ounce of dry cereal, or ½ cup of cooked rice, pasta, or cooked cereal. Try to choose whole-grain products as much as possible. Limit lean meat, poultry, and fish to 2 servings each day. A serving is 3 ounces, about the size of a deck of cards. Eat 4 to 5 servings of nuts, seeds, and legumes (cooked dried beans, lentils, and split peas) each week. A serving is 1/3 cup of nuts, 2 tablespoons of seeds, or ½ cup of cooked beans or peas. Limit fats and oils to 2 to 3 servings each day. A serving is 1 teaspoon of vegetable oil or 2 tablespoons of salad dressing. Limit sweets and added sugars to 5 servings or less a week. A serving is 1 tablespoon jelly or jam, ½ cup sorbet, or 1 cup of lemonade. Eat less than 2,300 milligrams (mg) of sodium a day. If you limit your sodium to 1,500 mg a day, you can lower your blood pressure even more. Tips for success  Start small. Do not try to make dramatic changes to your diet all at once. You might feel that you are missing out on your favorite foods and then be more likely to not follow the plan. Make small changes, and stick with them. Once those changes become habit, add a few more changes.   Try some of the following:  Make it a goal to eat a fruit or vegetable at every meal and at snacks. This will make it easy to get the recommended amount of fruits and vegetables each day. Try yogurt topped with fruit and nuts for a snack or healthy dessert. Add lettuce, tomato, cucumber, and onion to sandwiches. Combine a ready-made pizza crust with low-fat mozzarella cheese and lots of vegetable toppings. Try using tomatoes, squash, spinach, broccoli, carrots, cauliflower, and onions. Have a variety of cut-up vegetables with a low-fat dip as an appetizer instead of chips and dip. Sprinkle sunflower seeds or chopped almonds over salads. Or try adding chopped walnuts or almonds to cooked vegetables. Try some vegetarian meals using beans and peas. Add garbanzo or kidney beans to salads. Make burritos and tacos with mashed oliva beans or black beans. Where can you learn more? Go to https://Code Green NetworkspeBoomrat.Veratect. org and sign in to your [employer] account. Enter R172 in the WhoWanna box to learn more about \"DASH Diet: Care Instructions. \"     If you do not have an account, please click on the \"Sign Up Now\" link. Current as of: April 9, 2019  Content Version: 12.3  © 4221-5543 Healthwise, [employer]. Care instructions adapted under license by [facility] ([facility]). If you have questions about a medical condition or this instruction, always ask your healthcare professional. [place] [address] any warranty or liability for your use of this information. Patient Education        Learning About Living Benjzachery Santiago  What is a living will? A living will is a legal form you use to write down the kind of care you want at the end of your life. It is used by the health professionals who will treat you if you aren't able to decide for yourself. If you put your wishes in writing, your loved ones and others will know what kind of care you want. They won't need to guess. This can ease your mind and be helpful to others. A living will is not the same as an estate or property will.  An making decisions for you won't be surprised by your choices. Think about these questions as you make your living will:  Do you know enough about life support methods that might be used? If not, talk to your doctor so you know what might be done if you can't breathe on your own, your heart stops, or you're unable to swallow. What things would you still want to be able to do after you receive life-support methods? Would you want to be able to walk? To speak? To eat on your own? To live without the help of machines? If you have a choice, where do you want to be cared for? In your home? At a hospital or nursing home? Do you want certain Episcopal practices performed if you become very ill? If you have a choice at the end of your life, where would you prefer to die? At home? In a hospital or nursing home? Somewhere else? Would you prefer to be buried or cremated? Do you want your organs to be donated after you die? What should you do with your living will? Make sure that your family members and your health care agent have copies of your living will. Give your doctor a copy of your living will to keep in your medical record. If you have more than one doctor, make sure that each one has a copy. You may want to put a copy of your living will where it can be easily found. Where can you learn more? Go to https://Spotsetterpelucyeweb.Ubiquiti Networks. org and sign in to your Doctor Evidence account. Enter V193 in the BioCons[employer]ia box to learn more about \"Learning About Living Malena Ruiz. \"     If you do not have an account, please click on the \"Sign Up Now\" link. Current as of: April 1, 2019  Content Version: 12.3  © 3723-9691 Healthwise, [employer]orporated. Care instructions adapted under license by [facility] ([facility]). If you have questions about a medical condition or this instruction, always ask your healthcare professional. [address] any warranty or liability for your use of this information.

## 2020-10-23 ENCOUNTER — HOSPITAL ENCOUNTER (OUTPATIENT)
Dept: WOMENS IMAGING | Age: 75
Discharge: HOME | End: 2020-10-23
Payer: MEDICARE

## 2020-10-23 PROCEDURE — 77063 BREAST TOMOSYNTHESIS BI: CPT

## 2020-11-02 ENCOUNTER — TELEPHONE (OUTPATIENT)
Dept: FAMILY MEDICINE CLINIC | Age: 75
End: 2020-11-02

## 2020-11-02 ENCOUNTER — NURSE TRIAGE (OUTPATIENT)
Dept: OTHER | Facility: CLINIC | Age: 75
End: 2020-11-02

## 2020-11-02 ENCOUNTER — VIRTUAL VISIT (OUTPATIENT)
Dept: FAMILY MEDICINE CLINIC | Age: 75
End: 2020-11-02
Payer: MEDICARE

## 2020-11-02 PROBLEM — Z85.43 HISTORY OF OVARIAN CANCER: Status: ACTIVE | Noted: 2018-04-03

## 2020-11-02 PROCEDURE — 99213 OFFICE O/P EST LOW 20 MIN: CPT | Performed by: NURSE PRACTITIONER

## 2020-11-02 PROCEDURE — 1123F ACP DISCUSS/DSCN MKR DOCD: CPT | Performed by: NURSE PRACTITIONER

## 2020-11-02 PROCEDURE — G8400 PT W/DXA NO RESULTS DOC: HCPCS | Performed by: NURSE PRACTITIONER

## 2020-11-02 PROCEDURE — 3017F COLORECTAL CA SCREEN DOC REV: CPT | Performed by: NURSE PRACTITIONER

## 2020-11-02 PROCEDURE — 1090F PRES/ABSN URINE INCON ASSESS: CPT | Performed by: NURSE PRACTITIONER

## 2020-11-02 PROCEDURE — 4040F PNEUMOC VAC/ADMIN/RCVD: CPT | Performed by: NURSE PRACTITIONER

## 2020-11-02 PROCEDURE — G8428 CUR MEDS NOT DOCUMENT: HCPCS | Performed by: NURSE PRACTITIONER

## 2020-11-02 RX ORDER — AMOXICILLIN AND CLAVULANATE POTASSIUM 875; 125 MG/1; MG/1
1 TABLET, FILM COATED ORAL 2 TIMES DAILY
Qty: 20 TABLET | Refills: 0 | Status: SHIPPED | OUTPATIENT
Start: 2020-11-02 | End: 2020-11-12 | Stop reason: ALTCHOICE

## 2020-11-02 ASSESSMENT — ENCOUNTER SYMPTOMS
SHORTNESS OF BREATH: 0
SINUS PRESSURE: 1
COUGH: 1
RHINORRHEA: 0
SORE THROAT: 1

## 2020-11-02 NOTE — TELEPHONE ENCOUNTER
Left message for pt to call the office or get to ED. Pt had also left our office a voicemail and sounded horrible.

## 2020-11-02 NOTE — TELEPHONE ENCOUNTER
NT transferred [facility] back to me to schedule an appt for today 11-02 or prateek 11-03, she screened Red, she has cough, severe congestion, feeling tired, rib pain from coughing, sounds miserable, Red appt or VV?

## 2020-11-02 NOTE — PROGRESS NOTES
2020    TELEHEALTH EVALUATION -- Audio/Visual (During PXFXN-64 public health emergency)    HPI:    Luis Fernando Kang (:  1945) has requested an audio/video evaluation for the following concern(s):    Nasal congestion. Cough- productive at times. Headache. Rib pain from coughing. Sore throat has improved. No fever. Reports maxillary sinus pressure. Denies sick contacts. Review of Systems   Constitutional: Positive for fatigue. Negative for fever. HENT: Positive for congestion, sinus pressure and sore throat. Negative for rhinorrhea. Respiratory: Positive for cough. Negative for shortness of breath. Neurological: Positive for headaches. Prior to Visit Medications    Medication Sig Taking? Authorizing Provider   amoxicillin-clavulanate (AUGMENTIN) 875-125 MG per tablet Take 1 tablet by mouth 2 times daily for 10 days Yes Alejandrina Yen APRN - CNP   primidone (MYSOLINE) 50 MG tablet Take 1 tablet by mouth nightly  Sravanthi Forte MD   celecoxib (CELEBREX) 200 MG capsule Take 1 capsule by mouth daily  Sravanthi Forte MD       Social History     Tobacco Use    Smoking status: Never Smoker    Smokeless tobacco: Never Used   Substance Use Topics    Alcohol use: No    Drug use: No        Allergies   Allergen Reactions    Latex    ,   Past Medical History:   Diagnosis Date    Nephrolithiasis     Ovarian cancer ([facility] [address] 2018    Tremors of nervous system        PHYSICAL EXAMINATION:  [ INSTRUCTIONS:  \"[x]\" Indicates a positive item  \"[]\" Indicates a negative item  -- DELETE ALL ITEMS NOT EXAMINED]  Full set of vital signs was not obtained d/t lack of proper equipment.     Constitutional: [x] Appears well-developed and well-nourished [x] No apparent distress      [] Abnormal-   Mental status  [x] Alert and awake  [] Oriented to person/place/time [x]Able to follow commands      Eyes:  EOM    [x]  Normal  [] Abnormal-  Sclera  [x]  Normal  [] Abnormal -         Discharge [x]  None visible guardian's) consent, to reduce the patient's risk of exposure to COVID-19 and provide necessary medical care. The patient (and/or legal guardian) has also been advised to contact this office for worsening conditions or problems, and seek emergency medical treatment and/or call 911 if deemed necessary. Patient identification was verified at the start of the visit: Yes    Total time spent on this encounter: Not billed by time    Services were provided through a video synchronous discussion virtually to substitute for in-person clinic visit. Patient and provider were located at their individual homes. --Nabila Fagan, MARCELLO - CNP on 11/3/2020 at 11:17 AM    An electronic signature was used to authenticate this note.

## 2020-11-02 NOTE — TELEPHONE ENCOUNTER
Cold symptoms, congestion, chest pain, rib pain has been going on for 6 days, cough. No headache at this time, no fever. Started with sore throat. Sounds very congested. Ribs sore from coughing. Cough is non-productive. Congestion mucus is clear. Pain 5/10 with coughing. Sinus issues. Feels very tired. Main concern is  has COPD and has 1/2 lung missing she does not want to make him sick. Reason for Disposition   Patient wants to be seen    Answer Assessment - Initial Assessment Questions  1. ONSET: \"When did the cough begin? \"         See above    2. SEVERITY: \"How bad is the cough today? \"         Worse today. 3. RESPIRATORY DISTRESS: \"Describe your breathing. \"         Very congested, sounds like she is having difficulty breathing    4. FEVER: \"Do you have a fever? \" If so, ask: \"What is your temperature, how was it measured, and when did it start? \"        No    5. HEMOPTYSIS: \"Are you coughing up any blood? \" If so ask: \"How much? \" (flecks, streaks, tablespoons, etc.)        No    6. TREATMENT: \"What have you done so far to treat the cough? \" (e.g., meds, fluids, humidifier)        Taking OTC pills and was helping     7. CARDIAC HISTORY: \"Do you have any history of heart disease? \" (e.g., heart attack, congestive heart failure)         No    8. LUNG HISTORY: \"Do you have any history of lung disease? \"  (e.g., pulmonary embolus, asthma, emphysema)        No    9. PE RISK FACTORS: \"Do you have a history of blood clots? \" (or: recent major surgery, recent prolonged travel, bedridden)        No    10. OTHER SYMPTOMS: \"Do you have any other symptoms? (e.g., runny nose, wheezing, chest pain)          See above    11. PREGNANCY: \"Is there any chance you are pregnant? \" \"When was your last menstrual period? \"          No    12. TRAVEL: \"Have you traveled out of the country in the last month? \" (e.g., travel history, exposures)          No    Protocols used: COUGH-ADULT-OH    Caller provided care advice and instructed to call back with worsening symptoms. Can do VV. Warm transfer to Jane Elizondo at [address] [facility]. Attention Provider: Thank you for allowing me to participate in the care of your patient. The patient was connected to triage in response to information provided to the ECC. Please do not respond through this encounter as the response is not directed to a shared pool.

## 2020-11-03 ENCOUNTER — TELEPHONE (OUTPATIENT)
Dept: FAMILY MEDICINE CLINIC | Age: 75
End: 2020-11-03

## 2020-11-03 LAB — SARS-COV-2: DETECTED

## 2020-11-04 NOTE — TELEPHONE ENCOUNTER
Patient's  called, pt is not tolerating the Augmentin, is having nausea after she takes despite taking with food. She is feeling better, sore throat is gone, not coughing, no more HA. Is it ok for her to stop the augmentin or do you want to change her to something else.   Any new rx to [facility]  Call [facility] back at [phone]

## 2020-11-12 ENCOUNTER — VIRTUAL VISIT (OUTPATIENT)
Dept: FAMILY MEDICINE CLINIC | Age: 75
End: 2020-11-12
Payer: MEDICARE

## 2020-11-12 PROCEDURE — 99213 OFFICE O/P EST LOW 20 MIN: CPT | Performed by: FAMILY MEDICINE

## 2020-11-12 PROCEDURE — 1090F PRES/ABSN URINE INCON ASSESS: CPT | Performed by: FAMILY MEDICINE

## 2020-11-12 PROCEDURE — 1123F ACP DISCUSS/DSCN MKR DOCD: CPT | Performed by: FAMILY MEDICINE

## 2020-11-12 PROCEDURE — G8427 DOCREV CUR MEDS BY ELIG CLIN: HCPCS | Performed by: FAMILY MEDICINE

## 2020-11-12 PROCEDURE — 4040F PNEUMOC VAC/ADMIN/RCVD: CPT | Performed by: FAMILY MEDICINE

## 2020-11-12 PROCEDURE — G8400 PT W/DXA NO RESULTS DOC: HCPCS | Performed by: FAMILY MEDICINE

## 2020-11-12 PROCEDURE — 3017F COLORECTAL CA SCREEN DOC REV: CPT | Performed by: FAMILY MEDICINE

## 2020-11-12 ASSESSMENT — ENCOUNTER SYMPTOMS
SORE THROAT: 0
CONSTIPATION: 0
SHORTNESS OF BREATH: 1
ABDOMINAL PAIN: 0
NAUSEA: 0
EYE PAIN: 0
BACK PAIN: 0
RHINORRHEA: 0
COUGH: 0
VOMITING: 0
WHEEZING: 0
BLOOD IN STOOL: 0
DIARRHEA: 0
CHEST TIGHTNESS: 0

## 2020-11-12 NOTE — PATIENT INSTRUCTIONS
Patient Education        Learning About Coronavirus [phone])  Coronavirus [phone]): Overview  What is coronavirus (ABTEJ-82)? The coronavirus disease (COVID-19) is caused by a virus. It is an illness that was first found in December 2019. It has since spread worldwide. The virus can cause fever, cough, and trouble breathing. In severe cases, it can cause pneumonia and make it hard to breathe without help. It can cause death. This virus spreads person-to-person through droplets from coughing and sneezing. It can also spread when you are close to someone who is infected. And it can spread when you touch something that has the virus on it, such as a doorknob or a tabletop. Coronaviruses are a large group of viruses. They cause the common cold. They also cause more serious illnesses like Middle East respiratory syndrome (MERS) and severe acute respiratory syndrome (SARS). COVID-19 is caused by a novel coronavirus. That means it's a new type that has not been seen in people before. How is COVID-19 treated? Mild illness can be treated at home, but more serious illness needs to be treated in the hospital. Treatment may include medicines to reduce symptoms, plus breathing support such as oxygen therapy or a ventilator. Other treatments, such as antiviral medicines, may help people who have COVID-19. What can you do to protect yourself from COVID-19? The best way to protect yourself from getting sick is to:  · Avoid areas where there is an outbreak. · Avoid contact with people who may be infected. · Avoid crowds and try to stay at least 6 feet away from other people. · Wash your hands often, especially after you cough or sneeze. Use soap and water, and scrub for at least 20 seconds. If soap and water aren't available, use an alcohol-based hand . · Avoid touching your mouth, nose, and eyes. What can you do to avoid spreading the virus to others?   To help avoid spreading the virus to others:  · [address] your hands often with soap or alcohol-based hand sanitizers. · Cover your mouth with a tissue when you cough or sneeze. Then throw the tissue in the trash. · Use a disinfectant to clean things that you touch often. These include doorknobs, remote controls, phones, and handles on your refrigerator and microwave. And don't forget countertops, tabletops, bathrooms, and computer keyboards. · Wear a cloth face cover if you have to go to public areas. If you know or suspect that you have COVID-19:  · Stay home. Don't go to school, work, or public areas. And don't use public transportation, ride-shares, or taxis unless you have no choice. · Leave your home only if you need to get medical care or testing. But call the doctor's office first so they know you're coming. And wear a face cover. · Limit contact with people in your home. If possible, stay in a separate bedroom and use a separate bathroom. · Wear a face cover whenever you're around other people. It can help stop the spread of the virus when you cough or sneeze. · Clean and disinfect your home every day. Use household  and disinfectant wipes or sprays. Take special care to clean things that you grab with your hands. · Self-isolate until it's safe to be around others again. ? If you have symptoms, it's safe when you haven't had a fever for 3 days and your symptoms have improved and it's been at least 10 days since your symptoms started. ? If you were exposed to the virus but don't have symptoms, it's safe to be around others 14 days after exposure. ? Talk to your doctor about whether you also need testing, especially if you have a weakened immune system. When to call for help  Call 911 anytime you think you may need emergency care. For example, call if:  · You have severe trouble breathing. (You can't talk at all.)  · You have constant chest pain or pressure. · You are severely dizzy or lightheaded.   · You are confused or can't think clearly. · Your face and lips have a blue color. · You passed out (lost consciousness) or are very hard to wake up. Call your doctor now if you develop symptoms such as:  · Shortness of breath. · Fever. · Cough. If you need to get care, call ahead to the doctor's office for instructions before you go. Make sure you wear a face cover to prevent exposing other people to the virus. Where can you get the latest information? The following health organizations are tracking and studying this virus. Their websites contain the most up-to-date information. Lalo Jacinto also learn what to do if you think you may have been exposed to the virus. · U.S. Centers for Disease Control and Prevention (CDC): The CDC provides updated news about the disease and travel advice. The website also tells you how to prevent the spread of infection. www.cdc.gov  · World Health Organization [facility] [place]): WHO offers information about the virus outbreaks. WHO also has travel advice. www.who.int  Current as of: July 10, 2020               Content Version: 12.6  © 2006-2020 [employer]. Care instructions adapted under license by [facility] ([facility]). If you have questions about a medical condition or this instruction, always ask your healthcare professional. [address] any warranty or liability for your use of this information.

## 2020-11-12 NOTE — PROGRESS NOTES
2020    TELEHEALTH EVALUATION -- Audio/Visual (During JWMCR-14 public health emergency)  Patient at home. Physician at office. Visit using synchronous telecommunication system. HPI:    Patricia Rice (:  1945) has requested an audio/video evaluation for the following concern(s):    Seen today for ED follow up. Has had covid -19 for 17 days. Was on O2 at home. Pulse ox steadily improving 88 to 92. Is on off O2 currently. Monitoring pulse ox. Is on medrol dose pack. Discussed Vit c vit d zinc.  , nonsmoker, pmh reviewed. Review of Systems   Constitutional: Negative for chills, fatigue, fever and unexpected weight change. HENT: Negative for congestion, ear pain, rhinorrhea and sore throat. Loss of taste   Eyes: Negative for pain and visual disturbance. Respiratory: Positive for shortness of breath. Negative for cough, chest tightness and wheezing. Cardiovascular: Negative for chest pain and palpitations. Gastrointestinal: Negative for abdominal pain, blood in stool, constipation, diarrhea, nausea and vomiting. Genitourinary: Negative for difficulty urinating, frequency, hematuria and urgency. Musculoskeletal: Negative for back pain, joint swelling, myalgias and neck pain. Skin: Negative for rash. Neurological: Negative for dizziness and headaches. Hematological: Negative for adenopathy. Does not bruise/bleed easily. Psychiatric/Behavioral: Negative for behavioral problems and sleep disturbance. The patient is not nervous/anxious. Prior to Visit Medications    Medication Sig Taking? Authorizing Provider   primidone (MYSOLINE) 50 MG tablet Take 1 tablet by mouth nightly  Anaya Clancy MD   celecoxib (CELEBREX) 200 MG capsule Take 1 capsule by mouth daily  Anaya Clancy MD       Social History     Tobacco Use    Smoking status: Never Smoker    Smokeless tobacco: Never Used   Substance Use Topics    Alcohol use: No    Drug use:  No PHYSICAL EXAMINATION:  [ INSTRUCTIONS:  \"[x]\" Indicates a positive item  \"[]\" Indicates a negative item  -- DELETE ALL ITEMS NOT EXAMINED]  Vital Signs: (As obtained by patient/caregiver or practitioner observation)    Blood pressure-  Heart rate-    Respiratory rate-    Temperature-  Pulse oximetry-     Constitutional: [x] Appears well-developed and well-nourished [x] No apparent distress      [] Abnormal-   Mental status  [x] Alert and awake  [x] Oriented to person/place/time [x]Able to follow commands      Eyes:  EOM    []  Normal  [] Abnormal-  Sclera  [x]  Normal  [] Abnormal -         Discharge [x]  None visible  [] Abnormal -    HENT:   [x] Normocephalic, atraumatic. [] Abnormal   [] Mouth/Throat: Mucous membranes are moist.     External Ears [x] Normal  [] Abnormal-     Neck: [x] No visualized mass     Pulmonary/Chest: [] Respiratory effort normal.  [x] No visualized signs of difficulty breathing or respiratory distress        [] Abnormal-      Musculoskeletal:   [] Normal gait with no signs of ataxia         [x] Normal range of motion of neck        [] Abnormal-       Neurological:        [x] No Facial Asymmetry (Cranial nerve 7 motor function) (limited exam to video visit)          [x] No gaze palsy        [] Abnormal-         Skin:        [x] No significant exanthematous lesions or discoloration noted on facial skin         [] Abnormal-            Psychiatric:       [x] Normal Affect [x] No Hallucinations        [] Abnormal-     Other pertinent observable physical exam findings-     ASSESSMENT/PLAN:  1. COVID-19 virus infection  -slow improvement  -O2 if needed  -added Vit c vit d zinc    2. Malignant neoplasm of right ovary (HCC)  -stable, seeing [facility] oncology      No follow-ups on file. Juan M Frost is a 70 y.o. female being evaluated by a Virtual Visit (video visit) encounter to address concerns as mentioned above. A caregiver was present when appropriate.  Due to this being a TeleHealth encounter (During IZFLJ-93 public health emergency), evaluation of the following organ systems was limited: Vitals/Constitutional/EENT/Resp/CV/GI//MS/Neuro/Skin/Heme-Lymph-Imm. Pursuant to the emergency declaration under the [address] [place] [address], [address] [place] [address] authority and the [employer] Act, this Virtual Visit was conducted with patient's (and/or legal guardian's) consent, to reduce the patient's risk of exposure to COVID-19 and provide necessary medical care. The patient (and/or legal guardian) has also been advised to contact this office for worsening conditions or problems, and seek emergency medical treatment and/or call 911 if deemed necessary. Patient identification was verified at the start of the visit: Yes    Total time spent on this encounter: Not billed by time    Services were provided through a video synchronous discussion virtually to substitute for in-person clinic visit. Patient and provider were located at their individual homes. --Bessy Noguera MD on 11/12/2020 at 3:01 PM    An electronic signature was used to authenticate this note.

## 2021-02-15 DIAGNOSIS — R25.1 TREMOR: ICD-10-CM

## 2021-02-15 DIAGNOSIS — G25.0 BENIGN ESSENTIAL TREMOR: ICD-10-CM

## 2021-02-15 DIAGNOSIS — M17.0 PRIMARY OSTEOARTHRITIS OF BOTH KNEES: ICD-10-CM

## 2021-02-15 RX ORDER — CELECOXIB 200 MG/1
200 CAPSULE ORAL DAILY
Qty: 30 CAPSULE | Refills: 0 | Status: SHIPPED | OUTPATIENT
Start: 2021-02-15 | End: 2021-02-23 | Stop reason: SDUPTHER

## 2021-02-15 RX ORDER — PRIMIDONE 50 MG/1
50 TABLET ORAL NIGHTLY
Qty: 30 TABLET | Refills: 0 | Status: SHIPPED | OUTPATIENT
Start: 2021-02-15 | End: 2021-02-23 | Stop reason: SDUPTHER

## 2021-02-15 NOTE — TELEPHONE ENCOUNTER
Rosalina Kincaid needs refill of   Requested Prescriptions     Pending Prescriptions Disp Refills    celecoxib (CELEBREX) 200 MG capsule 90 capsule 3     Sig: Take 1 capsule by mouth daily    primidone (MYSOLINE) 50 MG tablet 90 tablet 3     Sig: Take 1 tablet by mouth nightly       Last Filled on:  1/16/20    Last Visit Date: 11/12/2020 VV covid    Next Visit Date:    Visit date not found    Pt ran out; wasn't taking the meds while she had covid. Quarantine complete in November. Pls call pt at [phone] only if probs.

## 2021-02-23 ENCOUNTER — OFFICE VISIT (OUTPATIENT)
Dept: FAMILY MEDICINE CLINIC | Age: 76
End: 2021-02-23
Payer: MEDICARE

## 2021-02-23 VITALS
WEIGHT: 271 LBS | HEART RATE: 74 BPM | BODY MASS INDEX: 45.15 KG/M2 | TEMPERATURE: 97 F | DIASTOLIC BLOOD PRESSURE: 82 MMHG | RESPIRATION RATE: 18 BRPM | HEIGHT: 65 IN | SYSTOLIC BLOOD PRESSURE: 132 MMHG

## 2021-02-23 DIAGNOSIS — R07.9 CHEST PAIN, UNSPECIFIED TYPE: ICD-10-CM

## 2021-02-23 DIAGNOSIS — M17.0 PRIMARY OSTEOARTHRITIS OF BOTH KNEES: ICD-10-CM

## 2021-02-23 DIAGNOSIS — C56.1 MALIGNANT NEOPLASM OF RIGHT OVARY (HCC): ICD-10-CM

## 2021-02-23 DIAGNOSIS — Z12.11 SCREEN FOR COLON CANCER: ICD-10-CM

## 2021-02-23 DIAGNOSIS — Z00.00 ROUTINE GENERAL MEDICAL EXAMINATION AT A HEALTH CARE FACILITY: Primary | ICD-10-CM

## 2021-02-23 DIAGNOSIS — R25.1 TREMOR: ICD-10-CM

## 2021-02-23 DIAGNOSIS — G25.0 BENIGN ESSENTIAL TREMOR: ICD-10-CM

## 2021-02-23 DIAGNOSIS — E66.01 MORBID OBESITY WITH BMI OF 40.0-44.9, ADULT (HCC): ICD-10-CM

## 2021-02-23 PROCEDURE — G8417 CALC BMI ABV UP PARAM F/U: HCPCS | Performed by: FAMILY MEDICINE

## 2021-02-23 PROCEDURE — 4040F PNEUMOC VAC/ADMIN/RCVD: CPT | Performed by: FAMILY MEDICINE

## 2021-02-23 PROCEDURE — G0439 PPPS, SUBSEQ VISIT: HCPCS | Performed by: FAMILY MEDICINE

## 2021-02-23 PROCEDURE — 1036F TOBACCO NON-USER: CPT | Performed by: FAMILY MEDICINE

## 2021-02-23 PROCEDURE — 1123F ACP DISCUSS/DSCN MKR DOCD: CPT | Performed by: FAMILY MEDICINE

## 2021-02-23 PROCEDURE — G8484 FLU IMMUNIZE NO ADMIN: HCPCS | Performed by: FAMILY MEDICINE

## 2021-02-23 PROCEDURE — G8427 DOCREV CUR MEDS BY ELIG CLIN: HCPCS | Performed by: FAMILY MEDICINE

## 2021-02-23 PROCEDURE — 93000 ELECTROCARDIOGRAM COMPLETE: CPT | Performed by: FAMILY MEDICINE

## 2021-02-23 PROCEDURE — 1090F PRES/ABSN URINE INCON ASSESS: CPT | Performed by: FAMILY MEDICINE

## 2021-02-23 PROCEDURE — 99213 OFFICE O/P EST LOW 20 MIN: CPT | Performed by: FAMILY MEDICINE

## 2021-02-23 PROCEDURE — 3017F COLORECTAL CA SCREEN DOC REV: CPT | Performed by: FAMILY MEDICINE

## 2021-02-23 PROCEDURE — G8400 PT W/DXA NO RESULTS DOC: HCPCS | Performed by: FAMILY MEDICINE

## 2021-02-23 RX ORDER — PRIMIDONE 50 MG/1
50 TABLET ORAL NIGHTLY
Qty: 90 TABLET | Refills: 3 | Status: SHIPPED | OUTPATIENT
Start: 2021-02-23 | End: 2022-03-03 | Stop reason: SDUPTHER

## 2021-02-23 RX ORDER — CELECOXIB 200 MG/1
200 CAPSULE ORAL DAILY
Qty: 90 CAPSULE | Refills: 3 | Status: SHIPPED | OUTPATIENT
Start: 2021-02-23 | End: 2022-03-03 | Stop reason: SDUPTHER

## 2021-02-23 ASSESSMENT — ENCOUNTER SYMPTOMS
BLOOD IN STOOL: 0
SHORTNESS OF BREATH: 0
COUGH: 0
BACK PAIN: 0
CONSTIPATION: 0
WHEEZING: 0
DIARRHEA: 0
RHINORRHEA: 0
CHEST TIGHTNESS: 0
SORE THROAT: 0
EYE PAIN: 0
VOMITING: 0
NAUSEA: 0
ABDOMINAL PAIN: 0

## 2021-02-23 ASSESSMENT — PATIENT HEALTH QUESTIONNAIRE - PHQ9
1. LITTLE INTEREST OR PLEASURE IN DOING THINGS: 0
SUM OF ALL RESPONSES TO PHQ9 QUESTIONS 1 & 2: 1
SUM OF ALL RESPONSES TO PHQ QUESTIONS 1-9: 1
2. FEELING DOWN, DEPRESSED OR HOPELESS: 1

## 2021-02-23 ASSESSMENT — LIFESTYLE VARIABLES: HOW OFTEN DO YOU HAVE A DRINK CONTAINING ALCOHOL: 0

## 2021-02-23 NOTE — PROGRESS NOTES
Augie Schwab (:  1945) is a 70 y.o. female,Established patient, here for evaluation of the following chief complaint(s):  Medicare AWV (chest pain )      ASSESSMENT/PLAN:  1. Routine general medical examination at a health care facility  2. Chest pain, unspecified type  -     EKG 12 Lead  3. Primary osteoarthritis of both knees  -     celecoxib (CELEBREX) 200 MG capsule; Take 1 capsule by mouth daily, Disp-90 capsule, R-3Normal  4. Benign essential tremor  -     primidone (MYSOLINE) 50 MG tablet; Take 1 tablet by mouth nightly, Disp-90 tablet, R-3Normal  5. Tremor  -     primidone (MYSOLINE) 50 MG tablet; Take 1 tablet by mouth nightly, Disp-90 tablet, R-3Normal  6. Malignant neoplasm of right ovary (HCC)  7. Morbid obesity with BMI of 40.0-44.9, adult [facility])      Return for Medicare Annual Wellness Visit in 1 year. SUBJECTIVE/OBJECTIVE:  HPI   Pt here for a check up. Reviewed BMI of 45. Encouraged diet, exercise and weight loss. 3 episodes of left sided chest pains over the last several months. Not activity associated. Some arm pain. No sob associated with it. Last EKG was in 2018 with [facility]. , non smoker, pmh reviewed. Review of Systems   Constitutional: Negative for chills, fatigue, fever and unexpected weight change. HENT: Negative for congestion, ear pain, rhinorrhea and sore throat. Eyes: Negative for pain and visual disturbance. Respiratory: Negative for cough, chest tightness, shortness of breath and wheezing. Cardiovascular: Positive for chest pain. Negative for palpitations. Gastrointestinal: Negative for abdominal pain, blood in stool, constipation, diarrhea, nausea and vomiting. Genitourinary: Negative for difficulty urinating, frequency, hematuria and urgency. Musculoskeletal: Negative for back pain, joint swelling, myalgias and neck pain. Skin: Negative for rash. Neurological: Negative for dizziness and headaches. Hematological: Negative for adenopathy. Does not bruise/bleed easily. Psychiatric/Behavioral: Negative for behavioral problems and sleep disturbance. The patient is not nervous/anxious. Physical Exam  Vitals signs and nursing note reviewed. Constitutional:       Appearance: She is well-developed. HENT:      Head: Normocephalic and atraumatic. Right Ear: External ear normal.      Left Ear: External ear normal.      Nose: Nose normal.      Mouth/Throat:      Mouth: Mucous membranes are moist.   Eyes:      Pupils: Pupils are equal, round, and reactive to light. Neck:      Musculoskeletal: Neck supple. Thyroid: No thyromegaly. Vascular: No carotid bruit. Cardiovascular:      Rate and Rhythm: Normal rate and regular rhythm. Heart sounds: Normal heart sounds. Pulmonary:      Breath sounds: Normal breath sounds. No wheezing or rales. Abdominal:      General: Bowel sounds are normal.      Palpations: Abdomen is soft. Tenderness: There is no abdominal tenderness. There is no guarding or rebound. Musculoskeletal: Normal range of motion. Lymphadenopathy:      Cervical: No cervical adenopathy. Skin:     General: Skin is warm and dry. Findings: No rash. Neurological:      Mental Status: She is alert and oriented to person, place, and time. Cranial Nerves: No cranial nerve deficit. Deep Tendon Reflexes: Reflexes are normal and symmetric. An electronic signature was used to authenticate this note. --Suellen Stafford MD   Medicare Annual Wellness Visit  Name: Riaz Montgomery Date: 2021   MRN: [id number] Sex: Female   Age: 70 y.o.  Ethnicity: Non-/Non    : 1945 Race: Francia Fuentes is here for Medicare AWV (chest pain ) Screenings for behavioral, psychosocial and functional/safety risks, and cognitive dysfunction are all negative except as indicated below. These results, as well as other patient data from the [address] form, are documented in Flowsheets linked to this Encounter. Allergies   Allergen Reactions    Latex          Prior to Visit Medications    Medication Sig Taking? Authorizing Provider   celecoxib (CELEBREX) 200 MG capsule Take 1 capsule by mouth daily Yes Doyle Silva MD   primidone (MYSOLINE) 50 MG tablet Take 1 tablet by mouth nightly Yes Doyle Silva MD         Past Medical History:   Diagnosis Date    Nephrolithiasis     Ovarian cancer ([address] 04/18/2018    Tremors of nervous system        Past Surgical History:   Procedure Laterality Date    CHOLECYSTECTOMY, LAPAROSCOPIC  08/2001    HYSTERECTOMY, TOTAL ABDOMINAL  04/18/2018    JOINT REPLACEMENT Right 2019    knee    KNEE SURGERY  1995    arthroscopic    TONSILLECTOMY  1965         Family History   Problem Relation Age of Onset    Cancer Father     Mult Sclerosis Sister     Hearing Loss Maternal Grandfather        CareTeam (Including outside providers/suppliers regularly involved in providing care):   Patient Care Team:  Doyle Silva MD as PCP - General (Family Medicine)  Doyle Silva MD as PCP - [facility] Empaneled Provider    Wt Readings from Last 3 Encounters:   02/23/21 271 lb (122.9 kg)   01/16/20 257 lb 3.2 oz (116.7 kg)   03/26/19 246 lb 3.2 oz (111.7 kg)     Vitals:    02/23/21 1003   BP: 132/82   Pulse: 74   Resp: 18   Temp: 97 °F (36.1 °C)   Weight: 271 lb (122.9 kg)   Height: 5' 4.8\" (1.646 m)     Body mass index is 45.38 kg/m². Based upon direct observation of the patient, evaluation of cognition reveals recent and remote memory intact. Patient's complete Health Risk Assessment and screening values have been reviewed and are found in Flowsheets. The following problems were reviewed today and where indicated follow up appointments were made and/or referrals ordered. Positive Risk Factor Screenings with Interventions:            General Health and ACP:  General  In general, how would you say your health is?: Very Good  In the past 7 days, have you experienced any of the following?  New or Increased Pain, New or Increased Fatigue, Loneliness, Social Isolation, Stress or Anger?: None of These  Do you get the social and emotional support that you need?: Yes  Do you have a Living Will?: (!) No  Advance Directives     Power of  Living Will ACP-Advance Directive ACP-Power of     Not on File Not on File Not on File Not on File      General Health Risk Interventions:  · No Living Will: Advance Care Planning addressed with patient today    Health Habits/Nutrition:  Health Habits/Nutrition  Do you exercise for at least 20 minutes 2-3 times per week?: (!) No  Have you lost any weight without trying in the past 3 months?: No  Do you eat only one meal per day?: No  Have you seen the dentist within the past year?: (!) No  Body mass index: (!) 45.37  Health Habits/Nutrition Interventions:  · Inadequate physical activity:  patient agrees to increase physical activity as follows: has a new stationary bike  · Nutritional issues:  educational materials for healthy, well-balanced diet provided  · Dental exam overdue:  patient encouraged to make appointment with his/her dentist       Personalized Preventive Plan   Current Health Maintenance Status  Immunization History   Administered Date(s) Administered    COVID-19, Stephen Bird, 30mcg/0.3ml Dose 01/25/2021        Health Maintenance   Topic Date Due    Hepatitis C screen  1945    DTaP/Tdap/Td vaccine (1 - Tdap) 10/06/1966    Lipid screen  10/06/1987    Shingles Vaccine (1 of 2) 10/06/1997  DEXA (modify frequency per FRAX score)  10/06/2002    Pneumococcal 65+ years Vaccine (1 of 1 - PPSV23) 10/06/2012    Colon Cancer Screen FIT/FOBT  03/09/2018    Annual Wellness Visit (AWV)  05/29/2019    Flu vaccine (1) 09/01/2020    Breast cancer screen  10/23/2022    COVID-19 Vaccine  Completed    Hepatitis A vaccine  Aged Out    Hepatitis B vaccine  Aged Out    Hib vaccine  Aged Out    Meningococcal (ACWY) vaccine  Aged Out     Recommendations for [employer] Due: see orders and patient instructions/AVS.  . Recommended screening schedule for the next 5-10 years is provided to the patient in written form: see Patient Germán White was seen today for medicare awv. Diagnoses and all orders for this visit:    Routine general medical examination at a health care facility    Chest pain, unspecified type  -     EKG 12 Lead--reviewed, unchanged from previous. No ischemia seen. Primary osteoarthritis of both knees   -refill celebrex  Benign essential tremor   -refilled primidone  Tremor    Malignant neoplasm of right ovary (HCC)   -stable, follows with [facility]  Morbid obesity with BMI of 40.0-44.9, adult ([facility] [address]   -Encouraged diet, exercise and weight loss.

## 2021-02-23 NOTE — PATIENT INSTRUCTIONS
Personalized Preventive Plan for Elis Mckeon - 2/23/2021  Medicare offers a range of preventive health benefits. Some of the tests and screenings are paid in full while other may be subject to a deductible, co-insurance, and/or copay. Some of these benefits include a comprehensive review of your medical history including lifestyle, illnesses that may run in your family, and various assessments and screenings as appropriate. After reviewing your medical record and screening and assessments performed today your provider may have ordered immunizations, labs, imaging, and/or referrals for you. A list of these orders (if applicable) as well as your Preventive Care list are included within your After Visit Summary for your review. Other Preventive Recommendations:    · A preventive eye exam performed by an eye specialist is recommended every 1-2 years to screen for glaucoma; cataracts, macular degeneration, and other eye disorders. · A preventive dental visit is recommended every 6 months. · Try to get at least 150 minutes of exercise per week or 10,000 steps per day on a pedometer . · Order or download the FREE \"Exercise & Physical Activity: Your Everyday Guide\" from [employer]. Call 3-792.201.3122 or search [employer] online. · You need 4648-3463 mg of calcium and 0012-6823 IU of vitamin D per day. It is possible to meet your calcium requirement with diet alone, but a vitamin D supplement is usually necessary to meet this goal.  · When exposed to the sun, use a sunscreen that protects against both UVA and UVB radiation with an SPF of 30 or greater. Reapply every 2 to 3 hours or after sweating, drying off with a towel, or swimming. · Always wear a seat belt when traveling in a car. Always wear a helmet when riding a bicycle or motorcycle.   Patient Education        Well Visit, Over 72: Care Instructions  Your Care Instructions Physical exams can help you stay healthy. Your doctor has checked your overall health and may have suggested ways to take good care of yourself. He or she also may have recommended tests. At home, you can help prevent illness with healthy eating, regular exercise, and other steps. Follow-up care is a key part of your treatment and safety. Be sure to make and go to all appointments, and call your doctor if you are having problems. It's also a good idea to know your test results and keep a list of the medicines you take. How can you care for yourself at home? Reach and stay at a healthy weight. This will lower your risk for many problems, such as obesity, diabetes, heart disease, and high blood pressure. Get at least 30 minutes of exercise on most days of the week. Walking is a good choice. You also may want to do other activities, such as running, swimming, cycling, or playing tennis or team sports. Do not smoke. Smoking can make health problems worse. If you need help quitting, talk to your doctor about stop-smoking programs and medicines. These can increase your chances of quitting for good. Protect your skin from too much sun. When you're outdoors from 10 a.m. to 4 p.m., stay in the shade or cover up with clothing and a hat with a wide brim. Wear sunglasses that block UV rays. Even when it's cloudy, put broad-spectrum sunscreen (SPF 30 or higher) on any exposed skin. See a dentist one or two times a year for checkups and to have your teeth cleaned. Wear a seat belt in the car. Follow your doctor's advice about when to have certain tests. These tests can spot problems early. For men and women  Cholesterol. Your doctor will tell you how often to have this done based on your overall health and other things that can increase your risk for heart attack and stroke. Blood pressure. Have your blood pressure checked during a routine doctor visit. Your doctor will tell you how often to check your blood pressure based on your age, your blood pressure results, and other factors. Diabetes. Ask your doctor whether you should have tests for diabetes. Vision. Experts recommend that you have yearly exams for glaucoma and other age-related eye problems. Hearing. Tell your doctor if you notice any change in your hearing. You can have tests to find out how well you hear. Colon cancer tests. Keep having colon cancer tests as your doctor recommends. You can have one of several types of tests. Heart attack and stroke risk. At least every 4 to 6 years, you should have your risk for heart attack and stroke assessed. Your doctor uses factors such as your age, blood pressure, cholesterol, and whether you smoke or have diabetes to show what your risk for a heart attack or stroke is over the next 10 years. Osteoporosis. Talk to your doctor about whether you should have a bone density test to find out whether you have thinning bones. Ask your doctor if you need to take a calcium plus vitamin D supplement. You may be able to get enough calcium and vitamin D through your diet. For women  Pap test and pelvic exam. You may no longer need a Pap test. Talk with your doctor about whether to stop or continue to have Pap tests. Breast exam and mammogram. Ask how often you should have a mammogram, which is an X-ray of your breasts. A mammogram can spot breast cancer before it can be felt and when it is easiest to treat. Thyroid disease. Talk to your doctor about whether to have your thyroid checked as part of a regular physical exam. Women have an increased chance of a thyroid problem.   For men Prostate exam. Talk to your doctor about whether you should have a blood test (called a PSA test) for prostate cancer. Experts recommend that you discuss the benefits and risks of the test with your doctor before you decide whether to have this test. Some experts say that men ages 79 and older no longer need testing. Abdominal aortic aneurysm. Ask your doctor whether you should have a test to check for an aneurysm. You may need a test if you ever smoked or if your parent, brother, sister, or child has had an aneurysm. When should you call for help? Watch closely for changes in your health, and be sure to contact your doctor if you have any problems or symptoms that concern you. Where can you learn more? Go to https://CoremetricspeBioVasculareb.Evolva. org and sign in to your [employer] account. Enter G313 in the [employer] box to learn more about \"Well Visit, Over 65: Care Instructions. \"     If you do not have an account, please click on the \"Sign Up Now\" link. Current as of: May 27, 2020               Content Version: 12.6  © 2325-1212 [employer]. Care instructions adapted under license by [facility] ([facility]). If you have questions about a medical condition or this instruction, always ask your healthcare professional. [place] [address] any warranty or liability for your use of this information. Patient Education        Chest Pain: Care Instructions  Your Care Instructions     There are many things that can cause chest pain. Some are not serious and will get better on their own in a few days. But some kinds of chest pain need more testing and treatment. Your doctor may have recommended a follow-up visit in the next 8 to 12 hours. If you are not getting better, you may need more tests or treatment. Even though your doctor has released you, you still need to watch for any problems. The doctor carefully checked you, but sometimes problems can develop later. If you have new symptoms or if your symptoms do not get better, get medical care right away. If you have worse or different chest pain or pressure that lasts more than 5 minutes or you passed out (lost consciousness), call 911 or seek other emergency help right away. A medical visit is only one step in your treatment. Even if you feel better, you still need to do what your doctor recommends, such as going to all suggested follow-up appointments and taking medicines exactly as directed. This will help you recover and help prevent future problems. How can you care for yourself at home? Rest until you feel better. Take your medicine exactly as prescribed. Call your doctor if you think you are having a problem with your medicine. Do not drive after taking a prescription pain medicine. When should you call for help? Call 911 if:     You passed out (lost consciousness).     You have severe difficulty breathing.     You have symptoms of a heart attack. These may include:  Chest pain or pressure, or a strange feeling in your chest.  Sweating. Shortness of breath. Nausea or vomiting. Pain, pressure, or a strange feeling in your back, neck, jaw, or upper belly or in one or both shoulders or arms. Lightheadedness or sudden weakness. A fast or irregular heartbeat. After you call 911, the  may tell you to chew 1 adult-strength or 2 to 4 low-dose aspirin. Wait for an ambulance. Do not try to drive yourself. Call your doctor today if:     You have any trouble breathing.     Your chest pain gets worse.     You are dizzy or lightheaded, or you feel like you may faint.     You are not getting better as expected.     You are having new or different chest pain. Where can you learn more? Go to https://chpepiceweb.[employer] org and sign in to your [employer] account. Enter A120 in the uMentionedhire box to learn more about \"Chest Pain: Care Instructions. \"     If you do not have an account, please click on the \"Sign Up Now\" link. Current as of: June 26, 2019               Content Version: 12.6  © 2506-3022 [employer]. Care instructions adapted under license by [facility] ([facility]). If you have questions about a medical condition or this instruction, always ask your healthcare professional. [place] [address] any warranty or liability for your use of this information. Patient Education        Learning About Living Perroy  What is a living will? A living will, also called a declaration, is a legal form. It tells your family and your doctor your wishes when you can't speak for yourself. It's used by the health professionals who will treat you as you near the end of your life or if you get seriously hurt or ill. If you put your wishes in writing, your loved ones and others will know what kind of care you want. They won't need to guess. This can ease your mind and be helpful to others. And you can change or cancel your living will at any time. A living will is not the same as an estate or property will. An estate will explains what you want to happen with your money and property after you die. How do you use it? A living will is used to describe the kinds of treatment or life support you want as you near the end of your life or if you get seriously hurt or ill. Keep these facts in mind about living cooper. Your living will is used only if you can't speak or make decisions for yourself. Most often, one or more doctors must certify that you can't speak or decide for yourself before your living will takes effect. If you get better and can speak for yourself again, you can accept or refuse any treatment. It doesn't matter what you said in your living will. Some states may limit your right to refuse treatment in certain cases. For example, you may need to clearly state in your living will that you don't want artificial hydration and nutrition, such as being fed through a tube. Is a living will a legal document? A living will is a legal document. Each state has its own laws about living cooper. And a living will may be called something else in your state. Here are some things to know about living cooper. You don't need an  to complete a living will. But legal advice can be helpful if your state's laws are unclear. It can also help if your health history is complicated or your family can't agree on what should be in your living will. You can change your living will at any time. Some people find that their wishes about end-of-life care change as their health changes. If you make big changes to your living will, complete a new form. If you move to another state, make sure that your living will is legal in the state where you now live. In most cases, doctors will respect your wishes even if you have a form from a different state. You might use a universal form that has been approved by many states. This kind of form can sometimes be filled out and stored online. Your digital copy will then be available wherever you have a connection to the internet. The doctors and nurses who need to treat you can find it right away. Your state may offer an online registry. This is another place where you can store your living will online. It's a good idea to get your living will notarized. This means using a person called a  to watch two people sign, or witness, your living will. What should you know when you create a living will?   Here are some questions to ask yourself as you make your living will: Do you know enough about life support methods that might be used? If not, talk to your doctor so you know what might be done if you can't breathe on your own, your heart stops, or you can't swallow. What things would you still want to be able to do after you receive life-support methods? Would you want to be able to walk? To speak? To eat on your own? To live without the help of machines? Do you want certain Adventism practices performed if you become very ill? If you have a choice, where do you want to be cared for? In your home? At a hospital or nursing home? If you have a choice at the end of your life, where would you prefer to die? At home? In a hospital or nursing home? Somewhere else? Would you prefer to be buried or cremated? Do you want your organs to be donated after you die? What should you do with your living will? Make sure that your family members and your health care agent have copies of your living will (also called a declaration). Give your doctor a copy of your living will. Ask him or her to keep it as part of your medical record. If you have more than one doctor, make sure that each one has a copy. Put a copy of your living will where it can be easily found. For example, some people may put a copy on their refrigerator door. If you are using a digital copy, be sure your doctor, family members, and health care agent know how to find and access it. Where can you learn more? Go to https://chpeamber.Sferra. org and sign in to your [employer] account. Enter Z140 in the H-umus box to learn more about \"Learning About Living Perroy. \"     If you do not have an account, please click on the \"Sign Up Now\" link. Current as of: December 9, 2019               Content Version: 12.6  © 7419-1206 [employer]. Care instructions adapted under license by [facility]. If you have questions about a medical condition or this instruction, always ask your healthcare professional. [address] any warranty or liability for your use of this information.

## 2021-03-30 ENCOUNTER — TELEPHONE (OUTPATIENT)
Dept: FAMILY MEDICINE CLINIC | Age: 76
End: 2021-03-30

## 2021-03-30 NOTE — LETTER
[address] [facility] [address] [address]  Dept: [phone]  Loc: [phone]      Bahman Watson MD        3/30/2021    [facility]      Dear Varinder Soto:    Our records indicate that you are overdue for your colon screening. Please return your fit test or contact our office if you need another one. Sincerely,        Bahman Watson MD         WHY YOU SHOULD GET SCREENED FOR COLON CANCER      FREE! Your insurance covers all negative results of the fit test 100%. 53,000 estimated DEATHS from colon cancer in 2020.      80% 5 year estimated survival rate with early detection compared to    14% at stage 4       70% at least have no family history      2nd leading cause of cancer-related DEATHS!

## 2021-04-20 ENCOUNTER — TELEPHONE (OUTPATIENT)
Dept: FAMILY MEDICINE CLINIC | Age: 76
End: 2021-04-20

## 2021-05-21 ENCOUNTER — TELEPHONE (OUTPATIENT)
Dept: FAMILY MEDICINE CLINIC | Age: 76
End: 2021-05-21

## 2021-05-21 NOTE — LETTER
[address] [facility] [address] [address]  Dept: [phone]  Loc: [place]        5/21/2021    [address] [address]      Dear Yenny Lema:    Our records indicate that you are overdue for your colon screening. Please return your FIT TEST or contact our office if you need another one.       Sincerely,        Meena Quintero MD

## 2021-05-25 ENCOUNTER — NURSE TRIAGE (OUTPATIENT)
Dept: OTHER | Facility: CLINIC | Age: 76
End: 2021-05-25

## 2021-05-25 NOTE — TELEPHONE ENCOUNTER
per pt    10. CAUSE: \"What do you think it is? \"        Pt unsure    11. OTHER SYMPTOMS: \"Do you have any other symptoms? \" (e.g., fever)        Pt denies    12. PREGNANCY: \"Is there any chance you are pregnant? \" \"When was your last menstrual period? \"        N/A    Protocols used: SKIN LESION - MOLES OR GROWTHS-ADULT-[place]

## 2021-05-26 ENCOUNTER — OFFICE VISIT (OUTPATIENT)
Dept: FAMILY MEDICINE CLINIC | Age: 76
End: 2021-05-26
Payer: MEDICARE

## 2021-05-26 VITALS
TEMPERATURE: 97.9 F | SYSTOLIC BLOOD PRESSURE: 126 MMHG | RESPIRATION RATE: 18 BRPM | HEART RATE: 82 BPM | BODY MASS INDEX: 44.74 KG/M2 | DIASTOLIC BLOOD PRESSURE: 78 MMHG | OXYGEN SATURATION: 97 % | WEIGHT: 267.2 LBS

## 2021-05-26 DIAGNOSIS — L82.1 SK (SEBORRHEIC KERATOSIS): ICD-10-CM

## 2021-05-26 DIAGNOSIS — Z12.11 SCREEN FOR COLON CANCER: ICD-10-CM

## 2021-05-26 DIAGNOSIS — L98.9 SKIN LESION OF LEFT LOWER LIMB: Primary | ICD-10-CM

## 2021-05-26 LAB
CONTROL: POSITIVE
HEMOCCULT STL QL: NEGATIVE

## 2021-05-26 PROCEDURE — 1123F ACP DISCUSS/DSCN MKR DOCD: CPT | Performed by: FAMILY MEDICINE

## 2021-05-26 PROCEDURE — 3017F COLORECTAL CA SCREEN DOC REV: CPT | Performed by: FAMILY MEDICINE

## 2021-05-26 PROCEDURE — 99214 OFFICE O/P EST MOD 30 MIN: CPT | Performed by: FAMILY MEDICINE

## 2021-05-26 PROCEDURE — 82274 ASSAY TEST FOR BLOOD FECAL: CPT | Performed by: FAMILY MEDICINE

## 2021-05-26 PROCEDURE — G8400 PT W/DXA NO RESULTS DOC: HCPCS | Performed by: FAMILY MEDICINE

## 2021-05-26 PROCEDURE — 1090F PRES/ABSN URINE INCON ASSESS: CPT | Performed by: FAMILY MEDICINE

## 2021-05-26 PROCEDURE — 1036F TOBACCO NON-USER: CPT | Performed by: FAMILY MEDICINE

## 2021-05-26 PROCEDURE — 4040F PNEUMOC VAC/ADMIN/RCVD: CPT | Performed by: FAMILY MEDICINE

## 2021-05-26 PROCEDURE — G8427 DOCREV CUR MEDS BY ELIG CLIN: HCPCS | Performed by: FAMILY MEDICINE

## 2021-05-26 PROCEDURE — G8417 CALC BMI ABV UP PARAM F/U: HCPCS | Performed by: FAMILY MEDICINE

## 2021-05-26 SDOH — ECONOMIC STABILITY: FOOD INSECURITY: WITHIN THE PAST 12 MONTHS, YOU WORRIED THAT YOUR FOOD WOULD RUN OUT BEFORE YOU GOT MONEY TO BUY MORE.: NEVER TRUE

## 2021-05-26 SDOH — ECONOMIC STABILITY: TRANSPORTATION INSECURITY
IN THE PAST 12 MONTHS, HAS THE LACK OF TRANSPORTATION KEPT YOU FROM MEDICAL APPOINTMENTS OR FROM GETTING MEDICATIONS?: NO

## 2021-05-26 ASSESSMENT — ENCOUNTER SYMPTOMS
CONSTIPATION: 0
CHEST TIGHTNESS: 0
BACK PAIN: 0
SHORTNESS OF BREATH: 0
NAUSEA: 0
BLOOD IN STOOL: 0
RHINORRHEA: 0
ABDOMINAL PAIN: 0
EYE PAIN: 0
COUGH: 0
DIARRHEA: 0
SORE THROAT: 0
VOMITING: 0
WHEEZING: 0

## 2021-05-26 NOTE — PROGRESS NOTES
Nancy Mahoney (:  1945) is a 70 y.o. female,Established patient, here for evaluation of the following chief complaint(s):  Skin Lesion (moles on left leg, chest, underside of left armpit)         ASSESSMENT/PLAN:  1. Skin lesion of left lower limb  -unknown diagnosis/prognosis of abnormal skin lesion, recommend excision and lab ID, will schedule. 2. Screen for colon cancer  -     POCT Fecal Immunochemical Test (FIT)  -  Results for orders placed or performed in visit on 21   POCT Fecal Immunochemical Test (FIT)   Result Value Ref Range    OCCULT BLOOD FECAL Negative     Control Positive    -Notify FIT test is negative. Continue yearly screens. 3. SK (seborrheic keratosis)  -multiple, discussed benign nature, handout printed. No follow-ups on file. Subjective   SUBJECTIVE/OBJECTIVE:  HPI   Pt here for a check up. Reviewed BMI of 45. Encouraged diet, exercise and weight loss. Has multiple moles to check. Some are dark or raised. , non smoker, pmh reviewed. Review of Systems   Constitutional: Negative for chills, fatigue, fever and unexpected weight change. HENT: Negative for congestion, ear pain, rhinorrhea and sore throat. Eyes: Negative for pain and visual disturbance. Respiratory: Negative for cough, chest tightness, shortness of breath and wheezing. Cardiovascular: Negative for chest pain and palpitations. Gastrointestinal: Negative for abdominal pain, blood in stool, constipation, diarrhea, nausea and vomiting. Genitourinary: Negative for difficulty urinating, frequency, hematuria and urgency. Musculoskeletal: Negative for back pain, joint swelling, myalgias and neck pain. Skin: Negative for rash. Neurological: Negative for dizziness and headaches. Hematological: Negative for adenopathy. Does not bruise/bleed easily. Psychiatric/Behavioral: Negative for behavioral problems and sleep disturbance. The patient is not nervous/anxious. Objective   Physical Exam  Skin:            Comments: Otherwise multiple SK. An electronic signature was used to authenticate this note.     --Jean Dacosta MD

## 2021-05-26 NOTE — PATIENT INSTRUCTIONS
size, or how it looks.     · Your skin is badly broken from scratching.     · You have signs of infection such as:  ? Pain, warmth, or swelling in your skin. ? Red streaks near a wound in your skin. ? Pus coming from a wound in your skin. ? A fever not due to the flu or other illness. Watch closely for changes in your health, and be sure to contact your doctor if:    · You do not get better as expected. Where can you learn more? Go to [email or web address]://[email or web address].VidFall.com. org and sign in to your [email or web address] account. Enter P132 in the [facility] box to learn more about \"Seborrheic Keratosis: Care Instructions. \"     If you do not have an account, please click on the \"Sign Up Now\" link. Current as of: July 2, 2020               Content Version: 12.8  © 9951-6378 [employer]. Care instructions adapted under license by [facility] ([facility]). If you have questions about a medical condition or this instruction, always ask your healthcare professional. [address] any warranty or liability for your use of this information.

## 2021-06-02 ENCOUNTER — PROCEDURE VISIT (OUTPATIENT)
Dept: FAMILY MEDICINE CLINIC | Age: 76
End: 2021-06-02
Payer: MEDICARE

## 2021-06-02 VITALS
RESPIRATION RATE: 18 BRPM | HEIGHT: 64 IN | SYSTOLIC BLOOD PRESSURE: 126 MMHG | BODY MASS INDEX: 45.72 KG/M2 | OXYGEN SATURATION: 94 % | HEART RATE: 74 BPM | DIASTOLIC BLOOD PRESSURE: 74 MMHG | WEIGHT: 267.8 LBS | TEMPERATURE: 97.6 F

## 2021-06-02 DIAGNOSIS — B07.9 VERRUCA VULGARIS: Primary | ICD-10-CM

## 2021-06-02 DIAGNOSIS — E66.01 MORBID OBESITY WITH BMI OF 45.0-49.9, ADULT (HCC): ICD-10-CM

## 2021-06-02 DIAGNOSIS — L98.9 SKIN LESION OF LEFT LOWER LIMB: ICD-10-CM

## 2021-06-02 PROCEDURE — 3017F COLORECTAL CA SCREEN DOC REV: CPT | Performed by: FAMILY MEDICINE

## 2021-06-02 PROCEDURE — 1123F ACP DISCUSS/DSCN MKR DOCD: CPT | Performed by: FAMILY MEDICINE

## 2021-06-02 PROCEDURE — 4040F PNEUMOC VAC/ADMIN/RCVD: CPT | Performed by: FAMILY MEDICINE

## 2021-06-02 PROCEDURE — 11402 EXC TR-EXT B9+MARG 1.1-2 CM: CPT | Performed by: FAMILY MEDICINE

## 2021-06-02 PROCEDURE — 1090F PRES/ABSN URINE INCON ASSESS: CPT | Performed by: FAMILY MEDICINE

## 2021-06-02 PROCEDURE — G8400 PT W/DXA NO RESULTS DOC: HCPCS | Performed by: FAMILY MEDICINE

## 2021-06-02 PROCEDURE — 99213 OFFICE O/P EST LOW 20 MIN: CPT | Performed by: FAMILY MEDICINE

## 2021-06-02 PROCEDURE — G8427 DOCREV CUR MEDS BY ELIG CLIN: HCPCS | Performed by: FAMILY MEDICINE

## 2021-06-02 PROCEDURE — 1036F TOBACCO NON-USER: CPT | Performed by: FAMILY MEDICINE

## 2021-06-02 PROCEDURE — G8417 CALC BMI ABV UP PARAM F/U: HCPCS | Performed by: FAMILY MEDICINE

## 2021-06-02 ASSESSMENT — ENCOUNTER SYMPTOMS
NAUSEA: 0
BACK PAIN: 0
BLOOD IN STOOL: 0
COUGH: 0
WHEEZING: 0
ABDOMINAL PAIN: 0
CHEST TIGHTNESS: 0
CONSTIPATION: 0
DIARRHEA: 0
SHORTNESS OF BREATH: 0
VOMITING: 0
SORE THROAT: 0
EYE PAIN: 0
RHINORRHEA: 0

## 2021-06-02 NOTE — PATIENT INSTRUCTIONS
Patient Education        Skin Lesion Removal: What to Expect at Home  Your Recovery  Skin lesion removal is a procedure or surgery to remove growths from the skin. There are many ways the doctor could have done this. After your procedure, you should not have much pain. But some soreness, swelling, or bruising is normal. Your doctor may recommend over-the-counter medicines to help with any discomfort. Most people can return to their normal routine the same day of their procedure. How quickly your wound heals depends on the size of your wound and the type of procedure you had. Most wounds take 1 to 3 weeks to heal. If you had laser surgery, your skin may change color and then slowly return to its normal color. You may need only a bandage, or you may need stitches. If you had stitches, your doctor will probably remove them 5 to 14 days later. If you have the type of stitches that dissolve, they don't have to be removed. They will disappear on their own. This care sheet gives you a general idea about how long it will take for you to recover. But each person recovers at a different pace. Follow the steps below to get better as quickly as possible. How can you care for yourself at home? Activity    · For the first few days, try not to bump or knock your wound.     · Depending on where your wound is, you may need to avoid strenuous exercise for 2 weeks after the procedure or until your doctor says it is okay.     · If you have had a lesion removed from your face, do not use makeup near your wound until you have your stitches taken out.     · Ask your doctor when it is okay to shower, bathe, or swim. Medicines    · Your doctor will tell you if and when you can restart your medicines. He or she will also give you instructions about taking any new medicines.     · If you take aspirin or some other blood thinner, ask your doctor if and when to start taking it again.  Make sure that you understand exactly what your doctor wants you to do.     · Be safe with medicines. Take pain medicines exactly as directed. ? If the doctor gave you a prescription medicine for pain, take it as prescribed. ? If you are not taking a prescription pain medicine, ask your doctor if you can take an over-the-counter medicine. Wound care    · If your doctor told you how to care for your incision, follow your doctor's instructions. If you did not get instructions, follow this general advice:  ? Keep the wound bandaged and dry for the first day. ? After the first 24 to 48 hours, wash around the wound with clean water 2 times a day. Don't use hydrogen peroxide or alcohol, which can slow healing. ? You may cover the wound with a thin layer of petroleum jelly, such as Vaseline, and a nonstick bandage. ? Apply more petroleum jelly and replace the bandage as needed.     · If you have stitches, you may get other instructions.     · If a scab forms, do not pull it off. Let it fall off on its own. Wounds heal faster if no scab forms. Washing the area every day and using petroleum jelly will help prevent a scab from forming.     · If the wound bleeds, put direct pressure on it with a clean cloth until the bleeding stops.     · If you had a growth \"frozen\" off, you may get a blister. Do not break it. Let it dry up on its own. It is common for the blister to fill with blood. You do not need to do anything about this, but if it becomes too painful, call your doctor.     · Avoid the sun until your stitches are removed. Follow-up care is a key part of your treatment and safety. Be sure to make and go to all appointments, and call your doctor if you are having problems. It's also a good idea to know your test results and keep a list of the medicines you take. When should you call for help? Call 911 anytime you think you may need emergency care.  For example, call if:    · You passed out (lost consciousness).     · You have severe trouble breathing.     ·

## 2021-06-07 ENCOUNTER — TELEPHONE (OUTPATIENT)
Dept: FAMILY MEDICINE CLINIC | Age: 76
End: 2021-06-07

## 2021-10-27 ENCOUNTER — NURSE TRIAGE (OUTPATIENT)
Dept: OTHER | Facility: CLINIC | Age: 76
End: 2021-10-27

## 2021-10-27 NOTE — TELEPHONE ENCOUNTER
Reason for Disposition   MILD difficulty breathing (e.g., minimal/no SOB at rest, SOB with walking, pulse < 100) of new onset or worse than normal    Answer Assessment - Initial Assessment Questions  1. RESPIRATORY STATUS: \"Describe your breathing? \" (e.g., wheezing, shortness of breath, unable to speak, severe coughing)       Shortness of breathing with activity      2. ONSET: \"When did this breathing problem begin? \"       A month or 2 or so    3. PATTERN \"Does the difficult breathing come and go, or has it been constant since it started? \"       Comes with activity    4. SEVERITY: \"How bad is your breathing? \" (e.g., mild, moderate, severe)     - MILD: No SOB at rest, mild SOB with walking, speaks normally in sentences, can lay down, no retractions, pulse < 100.     - MODERATE: SOB at rest, SOB with minimal exertion and prefers to sit, cannot lie down flat, speaks in phrases, mild retractions, audible wheezing, pulse 100-120.     - SEVERE: Very SOB at rest, speaks in single words, struggling to breathe, sitting hunched forward, retractions, pulse > 120       Mild    5. RECURRENT SYMPTOM: \"Have you had difficulty breathing before? \" If so, ask: \"When was the last time? \" and \"What happened that time? \"       It's been going on for awhile. I had COVID a year ago, and I haven't bounced back    6. CARDIAC HISTORY: \"Do you have any history of heart disease? \" (e.g., heart attack, angina, bypass surgery, angioplasty)       Denies    7. LUNG HISTORY: \"Do you have any history of lung disease? \"  (e.g., pulmonary embolus, asthma, emphysema)      Denies    8. CAUSE: \"What do you think is causing the breathing problem? \"       I have no idea. I had a couple of elevated b/p at my cancer check up. 9. OTHER SYMPTOMS: \"Do you have any other symptoms? (e.g., dizziness, runny nose, cough, chest pain, fever)      Chest pressure with  SOB,,     10. PREGNANCY: \"Is there any chance you are pregnant? \" \"When was your last menstrual period? \"        No    11. TRAVEL: \"Have you traveled out of the country in the last month? \" (e.g., travel history, exposures)        no    Protocols used: BREATHING DIFFICULTY-ADULT-[place]    Received call from [phone] [facility] with Red Flag Complaint. Brief description of triage: SOB with exertion    Triage indicates for patient to be seen today or tomorrow  Walk In clinic if no appts    Care advice provided, patient verbalizes understanding; denies any other questions or concerns; instructed to call back for any new or worsening symptoms. Writer provided warm transfer to [facility] for appointment scheduling. Attention Provider: Thank you for allowing me to participate in the care of your patient. The patient was connected to triage in response to information provided to the ECC/PSC. Please do not respond through this encounter as the response is not directed to a shared pool.

## 2021-10-28 ENCOUNTER — OFFICE VISIT (OUTPATIENT)
Dept: FAMILY MEDICINE CLINIC | Age: 76
End: 2021-10-28
Payer: MEDICARE

## 2021-10-28 ENCOUNTER — TELEPHONE (OUTPATIENT)
Dept: CARDIOLOGY CLINIC | Age: 76
End: 2021-10-28

## 2021-10-28 VITALS
BODY MASS INDEX: 46.2 KG/M2 | RESPIRATION RATE: 18 BRPM | TEMPERATURE: 97.4 F | HEART RATE: 65 BPM | OXYGEN SATURATION: 97 % | SYSTOLIC BLOOD PRESSURE: 128 MMHG | WEIGHT: 265.8 LBS | DIASTOLIC BLOOD PRESSURE: 78 MMHG

## 2021-10-28 DIAGNOSIS — R07.9 CHEST PAIN, UNSPECIFIED TYPE: ICD-10-CM

## 2021-10-28 DIAGNOSIS — R06.02 SOB (SHORTNESS OF BREATH) ON EXERTION: Primary | ICD-10-CM

## 2021-10-28 DIAGNOSIS — F32.1 CURRENT MODERATE EPISODE OF MAJOR DEPRESSIVE DISORDER WITHOUT PRIOR EPISODE (HCC): ICD-10-CM

## 2021-10-28 PROCEDURE — G8484 FLU IMMUNIZE NO ADMIN: HCPCS | Performed by: FAMILY MEDICINE

## 2021-10-28 PROCEDURE — G8427 DOCREV CUR MEDS BY ELIG CLIN: HCPCS | Performed by: FAMILY MEDICINE

## 2021-10-28 PROCEDURE — 4040F PNEUMOC VAC/ADMIN/RCVD: CPT | Performed by: FAMILY MEDICINE

## 2021-10-28 PROCEDURE — 3017F COLORECTAL CA SCREEN DOC REV: CPT | Performed by: FAMILY MEDICINE

## 2021-10-28 PROCEDURE — 1036F TOBACCO NON-USER: CPT | Performed by: FAMILY MEDICINE

## 2021-10-28 PROCEDURE — 1090F PRES/ABSN URINE INCON ASSESS: CPT | Performed by: FAMILY MEDICINE

## 2021-10-28 PROCEDURE — G8417 CALC BMI ABV UP PARAM F/U: HCPCS | Performed by: FAMILY MEDICINE

## 2021-10-28 PROCEDURE — G8400 PT W/DXA NO RESULTS DOC: HCPCS | Performed by: FAMILY MEDICINE

## 2021-10-28 PROCEDURE — 99214 OFFICE O/P EST MOD 30 MIN: CPT | Performed by: FAMILY MEDICINE

## 2021-10-28 PROCEDURE — 1123F ACP DISCUSS/DSCN MKR DOCD: CPT | Performed by: FAMILY MEDICINE

## 2021-10-28 RX ORDER — CITALOPRAM 20 MG/1
20 TABLET ORAL DAILY
Qty: 30 TABLET | Refills: 1 | Status: SHIPPED | OUTPATIENT
Start: 2021-10-28 | End: 2021-11-08 | Stop reason: SINTOL

## 2021-10-28 ASSESSMENT — PATIENT HEALTH QUESTIONNAIRE - PHQ9
SUM OF ALL RESPONSES TO PHQ QUESTIONS 1-9: 2
SUM OF ALL RESPONSES TO PHQ9 QUESTIONS 1 & 2: 2
1. LITTLE INTEREST OR PLEASURE IN DOING THINGS: 1
SUM OF ALL RESPONSES TO PHQ QUESTIONS 1-9: 2
SUM OF ALL RESPONSES TO PHQ QUESTIONS 1-9: 2
2. FEELING DOWN, DEPRESSED OR HOPELESS: 1

## 2021-10-28 ASSESSMENT — ENCOUNTER SYMPTOMS
NAUSEA: 0
VOMITING: 0
CONSTIPATION: 0
CHEST TIGHTNESS: 0
DIARRHEA: 0
BACK PAIN: 0
WHEEZING: 0
COUGH: 0
SORE THROAT: 0
RHINORRHEA: 0
EYE PAIN: 0
SHORTNESS OF BREATH: 1
ABDOMINAL PAIN: 0
BLOOD IN STOOL: 0

## 2021-10-28 NOTE — TELEPHONE ENCOUNTER
JUDSON for pt to return call. Gina CHAN [facility] Clinical Support Pool  NP referral to Dr. Mariusz German. Referred by Dr. Terry Powers for SOB on exertion and chest pain. Please contact patient to schedule.

## 2021-10-28 NOTE — PROGRESS NOTES
Selwyn Howard (:  1945) is a 78 y.o. female,Established patient, here for evaluation of the following chief complaint(s):  Shortness of Breath (SOB after activity, depression)         ASSESSMENT/PLAN:  1. SOB (shortness of breath) on exertion  -     XR CHEST (2 VW); Future  -     Niya Arevalo MD, Cardiology, [facility] [address]  -unknown diagnosis/prognosis, refer to cardiology for further evaluation and treatment    2. Chest pain, unspecified type  -     XR CHEST (2 VW); Future  -     Niya Arevalo MD, Cardiology, [facility] [address]  -unknown diagnosis/prognosis, refer to cardiology for further evaluation and treatment  3. Current moderate episode of major depressive disorder without prior episode (HCC)  -     citalopram (CELEXA) 20 MG tablet; Take 1 tablet by mouth daily, Disp-30 tablet, R-1Normal  -new problem, trial of citalopram, discussed mechanism of action, side effects, time frame for effectiveness, -monitor sxs, call if not improving      No follow-ups on file. Subjective   SUBJECTIVE/OBJECTIVE:  HPI   Patient here today for a check up. Reviewed BMI of  46. Encouraged diet, exercise and weight loss. Having sob with activity. Chest pains. Increased depression. Reviewed 2021 EKG, ok. No CXR. , non smoker, pmh reviewed. Review of Systems   Constitutional: Negative for chills, fatigue, fever and unexpected weight change. HENT: Negative for congestion, ear pain, rhinorrhea and sore throat. Eyes: Negative for pain and visual disturbance. Respiratory: Positive for shortness of breath. Negative for cough, chest tightness and wheezing. Cardiovascular: Positive for chest pain. Negative for palpitations. Gastrointestinal: Negative for abdominal pain, blood in stool, constipation, diarrhea, nausea and vomiting. Genitourinary: Negative for difficulty urinating, frequency, hematuria and urgency.    Musculoskeletal: Negative for back pain, joint swelling, myalgias and neck pain.   Skin: Negative for rash. Neurological: Negative for dizziness and headaches. Hematological: Negative for adenopathy. Does not bruise/bleed easily. Psychiatric/Behavioral: Positive for dysphoric mood and sleep disturbance. Negative for behavioral problems. The patient is not nervous/anxious. Objective   Physical Exam  Vitals and nursing note reviewed. Constitutional:       Appearance: She is well-developed. HENT:      Head: Normocephalic and atraumatic. Right Ear: External ear normal.      Left Ear: External ear normal.      Nose: Nose normal.      Mouth/Throat:      Mouth: Mucous membranes are moist.   Eyes:      Pupils: Pupils are equal, round, and reactive to light. Neck:      Thyroid: No thyromegaly. Cardiovascular:      Rate and Rhythm: Normal rate and regular rhythm. Heart sounds: Normal heart sounds. Pulmonary:      Breath sounds: Normal breath sounds. No wheezing or rales. Abdominal:      General: Bowel sounds are normal.      Palpations: Abdomen is soft. Tenderness: There is no abdominal tenderness. There is no guarding or rebound. Musculoskeletal:         General: Normal range of motion. Cervical back: Neck supple. Lymphadenopathy:      Cervical: No cervical adenopathy. Skin:     General: Skin is warm and dry. Findings: No rash. Neurological:      Mental Status: She is alert and oriented to person, place, and time. Cranial Nerves: No cranial nerve deficit. Deep Tendon Reflexes: Reflexes are normal and symmetric. An electronic signature was used to authenticate this note.     --Arash Kaufman MD

## 2021-10-28 NOTE — PATIENT INSTRUCTIONS
Patient Education        Chest Pain: Care Instructions  Your Care Instructions     There are many things that can cause chest pain. Some are not serious and will get better on their own in a few days. But some kinds of chest pain need more testing and treatment. Your doctor may have recommended a follow-up visit in the next 8 to 12 hours. If you are not getting better, you may need more tests or treatment. Even though your doctor has released you, you still need to watch for any problems. The doctor carefully checked you, but sometimes problems can develop later. If you have new symptoms or if your symptoms do not get better, get medical care right away. If you have worse or different chest pain or pressure that lasts more than 5 minutes or you passed out (lost consciousness), call 911 or seek other emergency help right away. A medical visit is only one step in your treatment. Even if you feel better, you still need to do what your doctor recommends, such as going to all suggested follow-up appointments and taking medicines exactly as directed. This will help you recover and help prevent future problems. How can you care for yourself at home? · Rest until you feel better. · Take your medicine exactly as prescribed. Call your doctor if you think you are having a problem with your medicine. · Do not drive after taking a prescription pain medicine. When should you call for help? Call 911 if:     · You passed out (lost consciousness).     · You have severe difficulty breathing.     · You have symptoms of a heart attack. These may include:  ? Chest pain or pressure, or a strange feeling in your chest.  ? Sweating. ? Shortness of breath. ? Nausea or vomiting. ? Pain, pressure, or a strange feeling in your back, neck, jaw, or upper belly or in one or both shoulders or arms. ? Lightheadedness or sudden weakness. ? A fast or irregular heartbeat.   After you call 911, the  may tell you to chew 1 Be sure to make and go to all appointments, and call your doctor if you are having problems. It's also a good idea to know your test results and keep a list of the medicines you take. How can you care for yourself at home? Learn about antidepressant medicines  Antidepressant medicines can improve or end the symptoms of depression. You may need to take the medicine for at least 6 months, and often longer. Keep taking your medicine even if you feel better. If you stop taking it too soon, your symptoms may come back or get worse. You may start to feel better within 1 to 3 weeks of taking antidepressant medicine. But it can take as many as 6 to 8 weeks to see more improvement. Talk to your doctor if you have problems with your medicine or if you do not notice any improvement after 3 weeks. Antidepressants can make you feel tired, dizzy, or nervous. Some people have dry mouth, constipation, headaches, sexual problems, an upset stomach, or diarrhea. Many of these side effects are mild and go away on their own after you take the medicine for a few weeks. Some may last longer. Talk to your doctor if side effects bother you too much. You might be able to try a different medicine. If you are pregnant or breastfeeding, talk to your doctor about what medicines you can take. Learn about counseling  In many cases, counseling can work as well as medicines to treat mild to moderate depression. Counseling is done by licensed mental health providers, such as psychologists, social workers, and some types of nurses. It can be done in one-on-one sessions or in a group setting. Many people find group sessions helpful. Cognitive-behavioral therapy is a type of counseling. In this treatment, you learn how to see and change unhelpful thinking styles that may be adding to your depression. Counseling and medicines often work well when used together. When should you call for help?    Call 911 anytime you think you may need emergency care. For example, call if:    · You feel you cannot stop from hurting yourself or someone else. Call your doctor now or seek immediate medical care if:    · You hear voices.     · You feel much more depressed. Watch closely for changes in your health, and be sure to contact your doctor if:    · You are having problems with your depression medicine.     · You are not getting better as expected. Where can you learn more? Go to https://Myowspepiceweb.QCoefficient. org and sign in to your Seawind account. Enter E009 in the [facility] box to learn more about \"Depression Treatment: Care Instructions. \"     If you do not have an account, please click on the \"Sign Up Now\" link. Current as of: June 16, 2021               Content Version: 13.0  © 3424-3343 Healthwise, Incorporated. Care instructions adapted under license by [facility] ([facility]). If you have questions about a medical condition or this instruction, always ask your healthcare professional. [place] [address] any warranty or liability for your use of this information. Patient Education        Shortness of Breath: Care Instructions  Your Care Instructions     Shortness of breath has many causes. Sometimes conditions such as anxiety can lead to shortness of breath. Some people get mild shortness of breath when they exercise. Trouble breathing also can be a symptom of a serious problem, such as asthma, lung disease, emphysema, heart problems, and pneumonia. If your shortness of breath continues, you may need tests and treatment. Watch for any changes in your breathing and other symptoms. Follow-up care is a key part of your treatment and safety. Be sure to make and go to all appointments, and call your doctor if you are having problems. It's also a good idea to know your test results and keep a list of the medicines you take. How can you care for yourself at home? · Do not smoke or allow others to smoke around you.  If you need help quitting, talk to your doctor about stop-smoking programs and medicines. These can increase your chances of quitting for good. · Get plenty of rest and sleep. · Take your medicines exactly as prescribed. Call your doctor if you think you are having a problem with your medicine. · Find healthy ways to deal with stress. ? Exercise daily. ? Get plenty of sleep. ? Eat regularly and well. When should you call for help? Call 911 anytime you think you may need emergency care. For example, call if:    · You have severe shortness of breath.     · You have symptoms of a heart attack. These may include:  ? Chest pain or pressure, or a strange feeling in the chest.  ? Sweating. ? Shortness of breath. ? Nausea or vomiting. ? Pain, pressure, or a strange feeling in the back, neck, jaw, or upper belly or in one or both shoulders or arms. ? Lightheadedness or sudden weakness. ? A fast or irregular heartbeat. After you call 911, the  may tell you to chew 1 adult-strength or 2 to 4 low-dose aspirin. Wait for an ambulance. Do not try to drive yourself. Call your doctor now or seek immediate medical care if:    · Your shortness of breath gets worse or you start to wheeze. Wheezing is a high-pitched sound when you breathe.     · You wake up at night out of breath or have to prop your head up on several pillows to breathe.     · You are short of breath after only light activity or while at rest.   Watch closely for changes in your health, and be sure to contact your doctor if:    · You do not get better over the next 1 to 2 days. Where can you learn more? Go to https://HashtrackpeEtalia.Tales2Go. org and sign in to your Myagi account. Enter S780 in the Jumblets box to learn more about \"Shortness of Breath: Care Instructions. \"     If you do not have an account, please click on the \"Sign Up Now\" link.   Current as of: July 6, 2021               Content Version: 13.0  © 9762-5451 Healthwise, Incorporated. Care instructions adapted under license by [facility] ([facility]). If you have questions about a medical condition or this instruction, always ask your healthcare professional. [address] any warranty or liability for your use of this information.

## 2021-11-04 ENCOUNTER — HOSPITAL ENCOUNTER (OUTPATIENT)
Age: 76
Discharge: HOME | End: 2021-11-04
Payer: MEDICARE

## 2021-11-04 ENCOUNTER — TELEPHONE (OUTPATIENT)
Dept: FAMILY MEDICINE CLINIC | Age: 76
End: 2021-11-04

## 2021-11-04 ENCOUNTER — HOSPITAL ENCOUNTER (OUTPATIENT)
Dept: GENERAL RADIOLOGY | Age: 76
Discharge: HOME | End: 2021-11-04
Payer: MEDICARE

## 2021-11-04 DIAGNOSIS — R07.9 CHEST PAIN, UNSPECIFIED TYPE: ICD-10-CM

## 2021-11-04 DIAGNOSIS — R06.02 SOB (SHORTNESS OF BREATH) ON EXERTION: ICD-10-CM

## 2021-11-04 PROCEDURE — 71046 X-RAY EXAM CHEST 2 VIEWS: CPT

## 2021-11-04 NOTE — TELEPHONE ENCOUNTER
----- Message from Malka Cole MD sent at 11/4/2021 12:07 PM EDT -----  Cxr is ok. No significant findings. Await cardiology input.

## 2021-11-08 ENCOUNTER — TELEPHONE (OUTPATIENT)
Dept: FAMILY MEDICINE CLINIC | Age: 76
End: 2021-11-08

## 2021-11-08 NOTE — TELEPHONE ENCOUNTER
Patient informed of recommendation. She wants to discuss this with her  and will call our office back.

## 2021-11-08 NOTE — TELEPHONE ENCOUNTER
Pt Lauro Gates began taking Celexa 11/1/21 after you prescribed it. Today, she reports she has been so lethargic that \"I can't hardly motivate. \" \"My arms feel like they're 50 pounds apiece. \" \"I sleep all night, I get up and I'm exhausted. \"    Says this is not working because \"I have a family to take care of.\"    Other side effects: stomach was a little queasy when she first began the med, but that has passed. Depression and weepiness have cleared since she started the med; says she hasn't shed a tear since she began taking it. \"But I would rather shed a tear than be like this. \"    Is taking it qhs, at 8:30-9:30 pm, after usually taking last meal of the day at 4-5 pm. Last night tried 1/2 a tab before bed and 1/2 this morning, but it didn't help. Pls advise. Pls call pt at [phone] to reply. I advised patient to not stop the med until she hears back from the doctor.     Last ov: 10/28/21 sob

## 2021-11-11 ENCOUNTER — OFFICE VISIT (OUTPATIENT)
Dept: CARDIOLOGY CLINIC | Age: 76
End: 2021-11-11
Payer: MEDICARE

## 2021-11-11 VITALS
BODY MASS INDEX: 44.73 KG/M2 | WEIGHT: 262 LBS | HEIGHT: 64 IN | SYSTOLIC BLOOD PRESSURE: 146 MMHG | HEART RATE: 76 BPM | DIASTOLIC BLOOD PRESSURE: 86 MMHG

## 2021-11-11 DIAGNOSIS — R06.02 SOB (SHORTNESS OF BREATH): ICD-10-CM

## 2021-11-11 DIAGNOSIS — R07.89 OTHER CHEST PAIN: Primary | ICD-10-CM

## 2021-11-11 DIAGNOSIS — I10 PRIMARY HYPERTENSION: ICD-10-CM

## 2021-11-11 PROCEDURE — G8427 DOCREV CUR MEDS BY ELIG CLIN: HCPCS | Performed by: NUCLEAR MEDICINE

## 2021-11-11 PROCEDURE — 3017F COLORECTAL CA SCREEN DOC REV: CPT | Performed by: NUCLEAR MEDICINE

## 2021-11-11 PROCEDURE — 99204 OFFICE O/P NEW MOD 45 MIN: CPT | Performed by: NUCLEAR MEDICINE

## 2021-11-11 PROCEDURE — G8484 FLU IMMUNIZE NO ADMIN: HCPCS | Performed by: NUCLEAR MEDICINE

## 2021-11-11 PROCEDURE — 4040F PNEUMOC VAC/ADMIN/RCVD: CPT | Performed by: NUCLEAR MEDICINE

## 2021-11-11 PROCEDURE — 1036F TOBACCO NON-USER: CPT | Performed by: NUCLEAR MEDICINE

## 2021-11-11 PROCEDURE — G8417 CALC BMI ABV UP PARAM F/U: HCPCS | Performed by: NUCLEAR MEDICINE

## 2021-11-11 PROCEDURE — 93000 ELECTROCARDIOGRAM COMPLETE: CPT | Performed by: NUCLEAR MEDICINE

## 2021-11-11 PROCEDURE — 1123F ACP DISCUSS/DSCN MKR DOCD: CPT | Performed by: NUCLEAR MEDICINE

## 2021-11-11 PROCEDURE — G8400 PT W/DXA NO RESULTS DOC: HCPCS | Performed by: NUCLEAR MEDICINE

## 2021-11-11 PROCEDURE — 1090F PRES/ABSN URINE INCON ASSESS: CPT | Performed by: NUCLEAR MEDICINE

## 2021-11-11 ASSESSMENT — ENCOUNTER SYMPTOMS
COLOR CHANGE: 0
BACK PAIN: 0
CHEST TIGHTNESS: 1
PHOTOPHOBIA: 0
NAUSEA: 0
CONSTIPATION: 0
ABDOMINAL DISTENTION: 0
VOMITING: 0
ANAL BLEEDING: 0
BLOOD IN STOOL: 0
SHORTNESS OF BREATH: 1
RECTAL PAIN: 0
ABDOMINAL PAIN: 0
DIARRHEA: 0

## 2021-11-11 NOTE — PROGRESS NOTES
[id number] [facility]  [address] [place] [id number]  Dept: [phone]  Dept Fax: [phone]  Loc: [phone]    Visit Date: 11/11/2021    Rudy Ferro is a 78 y.o. female who presents todayfor:  Chief Complaint   Patient presents with    New Patient    Shortness of Breath    Chest Pain    Obesity     Here for first time  Had COVID last November  Was sick   Was going for a while  Since then still tired  More fatigue   More than usual   Does have dyspnea on exertion as well  Progressed lately   Some chest heaviness  No triggers  No radiation per se  middle chest pain   No known CAD before  She is obese  Does have higher today   Usually better but higher lately   Does have signs of sleep apnea  No CPAP  No smoking  Family history of CAD  Multiple members      HPI:  HPI  Past Medical History:   Diagnosis Date    Nephrolithiasis     Ovarian cancer ([address] 04/18/2018    Tremors of nervous system       Past Surgical History:   Procedure Laterality Date    CHOLECYSTECTOMY, LAPAROSCOPIC  08/2001    HYSTERECTOMY, TOTAL ABDOMINAL  04/18/2018    JOINT REPLACEMENT Right 2019    knee    KNEE SURGERY  1995    arthroscopic    TONSILLECTOMY  1965     Family History   Problem Relation Age of Onset    Cancer Father     Mult Sclerosis Sister     Hearing Loss Maternal Grandfather      Social History     Tobacco Use    Smoking status: Never Smoker    Smokeless tobacco: Never Used   Substance Use Topics    Alcohol use: No      Current Outpatient Medications   Medication Sig Dispense Refill    ZINC PO Take 50 mg by mouth      celecoxib (CELEBREX) 200 MG capsule Take 1 capsule by mouth daily 90 capsule 3    primidone (MYSOLINE) 50 MG tablet Take 1 tablet by mouth nightly 90 tablet 3     No current facility-administered medications for this visit.      Allergies   Allergen Reactions    Latex      Health Maintenance   Topic Date Due    Hepatitis C screen  Never done    DTaP/Tdap/Td vaccine (1 - Tdap) Never done    Lipid screen  Never done    Shingles Vaccine (1 of 2) Never done    DEXA (modify frequency per FRAX score)  Never done    Pneumococcal 65+ years Vaccine (1 of 1 - PPSV23) Never done    Flu vaccine (1) Never done    COVID-19 Vaccine (3 - Booster for Mitchell Peter series) 09/02/2021    Annual Wellness Visit (AWV)  02/24/2022    Colon Cancer Screen FIT/FOBT  05/26/2022    Breast cancer screen  10/23/2022    Hepatitis A vaccine  Aged Out    Hepatitis B vaccine  Aged Out    Hib vaccine  Aged Out    Meningococcal (ACWY) vaccine  Aged Out       Subjective:  Review of Systems   Constitutional: Positive for fatigue. HENT: Negative for ear pain and nosebleeds. Eyes: Negative for photophobia. Respiratory: Positive for chest tightness and shortness of breath. Cardiovascular: Positive for chest pain and palpitations. Gastrointestinal: Negative for abdominal distention, abdominal pain, anal bleeding, blood in stool, constipation, diarrhea, nausea, rectal pain and vomiting. Endocrine: Negative for polyphagia. Genitourinary: Negative for dyspareunia, flank pain and urgency. Musculoskeletal: Negative for arthralgias, back pain, gait problem, joint swelling, myalgias, neck pain and neck stiffness. Skin: Negative for color change, pallor, rash and wound. Allergic/Immunologic: Negative for food allergies. Neurological: Negative for dizziness, syncope and light-headedness. Psychiatric/Behavioral: Negative for behavioral problems, confusion, decreased concentration, dysphoric mood, hallucinations and self-injury. The patient is not nervous/anxious and is not hyperactive. Objective:  Physical Exam  HENT:      Head: Normocephalic. Right Ear: Tympanic membrane normal.      Nose: Nose normal.      Mouth/Throat:      Mouth: Mucous membranes are moist.   Eyes:      Pupils: Pupils are equal, round, and reactive to light. Cardiovascular:      Rate and Rhythm: Normal rate and regular rhythm. Heart sounds: Murmur heard. No gallop. Pulmonary:      Effort: No respiratory distress. Breath sounds: No stridor. No wheezing, rhonchi or rales. Chest:      Chest wall: No tenderness. Abdominal:      General: There is no distension. Palpations: There is no mass. Tenderness: There is no abdominal tenderness. There is no right CVA tenderness, left CVA tenderness, guarding or rebound. Hernia: No hernia is present. Musculoskeletal:         General: No swelling, tenderness, deformity or signs of injury. Cervical back: Normal range of motion. Right lower leg: No edema. Left lower leg: No edema. Skin:     Coloration: Skin is not jaundiced or pale. Findings: No bruising, erythema, lesion or rash. Neurological:      Mental Status: She is alert and oriented to person, place, and time. Cranial Nerves: No cranial nerve deficit. Sensory: No sensory deficit. Motor: No weakness. Coordination: Coordination normal.      Gait: Gait normal.      Deep Tendon Reflexes: Reflexes normal.   Psychiatric:         Mood and Affect: Mood normal.         Thought Content: Thought content normal.       BP (!) 146/86   Pulse 76   Ht 5' 4\" (1.626 m)   Wt 262 lb (118.8 kg)   BMI 44.97 kg/m²     Assessment:      Diagnosis Orders   1. Other chest pain  EKG 12 Lead   2. SOB (shortness of breath)  EKG 12 Lead   3. Primary hypertension     does have risk for CAD  Obesity as well  Multiple signs and symptoms suggestive of sleep apnea, will consider sleep study after cardiac evaluation is completed  ECG in office was done today. I reviewed the ECG. No acute findings      Plan:  No follow-ups on file. As above  Non invasive cardiac testing will be ordered to further evaluate for any ischemic or structural heart disease as a cause of the patient symptoms.  We will proceed with a Stress Cardiolite test and echo soon. Continue risk factor modification and medical management  Thank you for allowing me to participate in the care of your patient. Please don't hesitate to contact me regarding any further issues related to the patient care    Orders Placed:  Orders Placed This Encounter   Procedures    EKG 12 Lead     Order Specific Question:   Reason for Exam?     Answer: Other       Medications Prescribed:  No orders of the defined types were placed in this encounter. Discussed use, benefit, and side effects of prescribed medications. All patient questions answered. Pt voicedunderstanding. Instructed to continue current medications, diet and exercise. Continue risk factor modification and medical management. Patient agreed with treatment plan. Follow up as directed.     Electronically signedby Gaye Laurent MD on 11/11/2021 at 8:54 AM

## 2021-11-12 NOTE — TELEPHONE ENCOUNTER
Spoke with patient and she has felt really good and does not wish to go on any other medication right now. She will contact the office if she changes her mind.

## 2021-11-22 ENCOUNTER — HOSPITAL ENCOUNTER (OUTPATIENT)
Dept: NON INVASIVE DIAGNOSTICS | Age: 76
Discharge: HOME | End: 2021-11-22
Payer: MEDICARE

## 2021-11-22 ENCOUNTER — TELEPHONE (OUTPATIENT)
Dept: CARDIOLOGY CLINIC | Age: 76
End: 2021-11-22

## 2021-11-22 VITALS — HEIGHT: 65 IN | BODY MASS INDEX: 43.65 KG/M2 | WEIGHT: 262 LBS

## 2021-11-22 DIAGNOSIS — R94.39 ABNORMAL STRESS TEST: Primary | ICD-10-CM

## 2021-11-22 DIAGNOSIS — R06.02 SOB (SHORTNESS OF BREATH): ICD-10-CM

## 2021-11-22 DIAGNOSIS — R07.89 OTHER CHEST PAIN: ICD-10-CM

## 2021-11-22 DIAGNOSIS — I10 PRIMARY HYPERTENSION: ICD-10-CM

## 2021-11-22 LAB
LV EF: 45 %
LVEF MODALITY: NORMAL

## 2021-11-22 PROCEDURE — A9500 TC99M SESTAMIBI: HCPCS | Performed by: NUCLEAR MEDICINE

## 2021-11-22 PROCEDURE — 6360000002 HC RX W HCPCS

## 2021-11-22 PROCEDURE — 93017 CV STRESS TEST TRACING ONLY: CPT | Performed by: NUCLEAR MEDICINE

## 2021-11-22 PROCEDURE — 93306 TTE W/DOPPLER COMPLETE: CPT

## 2021-11-22 PROCEDURE — 3430000000 HC RX DIAGNOSTIC RADIOPHARMACEUTICAL: Performed by: NUCLEAR MEDICINE

## 2021-11-22 PROCEDURE — 78452 HT MUSCLE IMAGE SPECT MULT: CPT

## 2021-11-22 RX ADMIN — Medication 30.1 MILLICURIE: at 12:45

## 2021-11-22 RX ADMIN — Medication 8.3 MILLICURIE: at 11:45

## 2021-12-07 ENCOUNTER — PREP FOR PROCEDURE (OUTPATIENT)
Dept: CARDIOLOGY | Age: 76
End: 2021-12-07

## 2021-12-07 RX ORDER — SODIUM CHLORIDE 0.9 % (FLUSH) 0.9 %
5-40 SYRINGE (ML) INJECTION EVERY 12 HOURS SCHEDULED
Status: CANCELLED | OUTPATIENT
Start: 2021-12-07

## 2021-12-07 RX ORDER — NITROGLYCERIN 0.4 MG/1
0.4 TABLET SUBLINGUAL EVERY 5 MIN PRN
Status: CANCELLED | OUTPATIENT
Start: 2021-12-07

## 2021-12-07 RX ORDER — ASPIRIN 325 MG
325 TABLET ORAL ONCE
Status: CANCELLED | OUTPATIENT
Start: 2021-12-07 | End: 2021-12-07

## 2021-12-07 RX ORDER — SODIUM CHLORIDE 9 MG/ML
INJECTION, SOLUTION INTRAVENOUS CONTINUOUS
Status: CANCELLED | OUTPATIENT
Start: 2021-12-07

## 2021-12-07 RX ORDER — SODIUM CHLORIDE 9 MG/ML
25 INJECTION, SOLUTION INTRAVENOUS PRN
Status: CANCELLED | OUTPATIENT
Start: 2021-12-07

## 2021-12-07 RX ORDER — SODIUM CHLORIDE 0.9 % (FLUSH) 0.9 %
5-40 SYRINGE (ML) INJECTION PRN
Status: CANCELLED | OUTPATIENT
Start: 2021-12-07

## 2021-12-07 RX ORDER — DIPHENHYDRAMINE HYDROCHLORIDE 50 MG/ML
50 INJECTION INTRAMUSCULAR; INTRAVENOUS ONCE
Status: CANCELLED | OUTPATIENT
Start: 2021-12-07 | End: 2021-12-07

## 2021-12-09 ENCOUNTER — TELEPHONE (OUTPATIENT)
Dept: FAMILY MEDICINE CLINIC | Age: 76
End: 2021-12-09

## 2021-12-09 NOTE — TELEPHONE ENCOUNTER
[facility] Initial Follow Up Call    Outreach made within 2 business days of discharge: Yes    Patient: Mary Beth Cloud Patient : 1945   MRN: [id number]  Reason for Admission: There are no discharge diagnoses documented for the most recent discharge. Discharge Date: 21       Spoke with: Coco    Discharge department/facility: [facility]    TCM Interactive Patient Contact:  Was patient able to fill all prescriptions: Yes  Was patient instructed to bring all medications to the follow-up visit: Yes  Is patient taking all medications as directed in the discharge summary?  Yes  Does patient understand their discharge instructions: Yes  Does patient have questions or concerns that need addressed prior to 7-14 day follow up office visit: no    Scheduled appointment with PCP within 7-14 days    Follow Up  Future Appointments   Date Time Provider Eden Topete   2022  9:00 AM [place]

## 2022-02-11 ENCOUNTER — OFFICE VISIT (OUTPATIENT)
Dept: CARDIOLOGY CLINIC | Age: 77
End: 2022-02-11
Payer: MEDICARE

## 2022-02-11 VITALS
HEART RATE: 77 BPM | HEIGHT: 64 IN | WEIGHT: 259.7 LBS | BODY MASS INDEX: 44.34 KG/M2 | DIASTOLIC BLOOD PRESSURE: 82 MMHG | SYSTOLIC BLOOD PRESSURE: 150 MMHG

## 2022-02-11 DIAGNOSIS — I10 PRIMARY HYPERTENSION: Primary | ICD-10-CM

## 2022-02-11 DIAGNOSIS — I25.10 CORONARY ARTERY DISEASE INVOLVING NATIVE CORONARY ARTERY OF NATIVE HEART WITHOUT ANGINA PECTORIS: ICD-10-CM

## 2022-02-11 PROCEDURE — 99213 OFFICE O/P EST LOW 20 MIN: CPT | Performed by: NUCLEAR MEDICINE

## 2022-02-11 PROCEDURE — 1036F TOBACCO NON-USER: CPT | Performed by: NUCLEAR MEDICINE

## 2022-02-11 PROCEDURE — 1090F PRES/ABSN URINE INCON ASSESS: CPT | Performed by: NUCLEAR MEDICINE

## 2022-02-11 PROCEDURE — G8400 PT W/DXA NO RESULTS DOC: HCPCS | Performed by: NUCLEAR MEDICINE

## 2022-02-11 PROCEDURE — G8484 FLU IMMUNIZE NO ADMIN: HCPCS | Performed by: NUCLEAR MEDICINE

## 2022-02-11 PROCEDURE — 4040F PNEUMOC VAC/ADMIN/RCVD: CPT | Performed by: NUCLEAR MEDICINE

## 2022-02-11 PROCEDURE — 1123F ACP DISCUSS/DSCN MKR DOCD: CPT | Performed by: NUCLEAR MEDICINE

## 2022-02-11 PROCEDURE — G8417 CALC BMI ABV UP PARAM F/U: HCPCS | Performed by: NUCLEAR MEDICINE

## 2022-02-11 PROCEDURE — 3017F COLORECTAL CA SCREEN DOC REV: CPT | Performed by: NUCLEAR MEDICINE

## 2022-02-11 PROCEDURE — G8427 DOCREV CUR MEDS BY ELIG CLIN: HCPCS | Performed by: NUCLEAR MEDICINE

## 2022-02-11 NOTE — PROGRESS NOTES
[address] [address]  [address]  [facility] [address]  Dept: [phone]  Dept Fax: [phone]  Loc: [phone]    Visit Date: 2/11/2022    Tiffany Wiseman is a 78 y.o. female who presents todayfor:  Chief Complaint   Patient presents with    Follow-up    Hypertension    Coronary Artery Disease   had a  Cath   Mild CAD  Still with dyspnea  No chest pain   Likely sleep apnea  Bp is higher today   Better at home   No meds       HPI:  HPI  Past Medical History:   Diagnosis Date    Nephrolithiasis     Ovar[address] [facility] ([facility] [address] [address] 04/18/2018    Tremors of nervous system       Past Surgical History:   Procedure Laterality Date    CHOLECYSTECTOMY, LAPAROSCOPIC  08/2001    HYSTERECTOMY, TOTAL ABDOMINAL  04/18/2018    JOINT REPLACEMENT Right 2019    knee    KNEE SURGERY  1995    arthroscopic    TONSILLECTOMY  1965     Family History   Problem Relation Age of Onset    Cancer Father     Mult Sclerosis Sister     Hearing Loss Maternal Grandfather      Social History     Tobacco Use    Smoking status: Never Smoker    Smokeless tobacco: Never Used   Substance Use Topics    Alcohol use: No      Current Outpatient Medications   Medication Sig Dispense Refill    ZINC PO Take 50 mg by mouth      celecoxib (CELEBREX) 200 MG capsule Take 1 capsule by mouth daily 90 capsule 3    primidone (MYSOLINE) 50 MG tablet Take 1 tablet by mouth nightly 90 tablet 3     No current facility-administered medications for this visit.      Allergies   Allergen Reactions    Latex      Health Maintenance   Topic Date Due    Hepatitis C screen  Never done    DTaP/Tdap/Td vaccine (1 - Tdap) Never done    Lipid screen  Never done    Shingles Vaccine (1 of 2) Never done    DEXA (modify frequency per FRAX score)  Never done    Pneumococcal 65+ years Vaccine (1 of 1 - PPSV23) Never done    Flu vaccine (1) Never done   [employer] Visit (AWV)  02/24/2022    Colon Cancer Screen FIT/FOBT  05/26/2022    Breast cancer screen  10/23/2022    Depression Monitoring  10/28/2022    COVID-19 Vaccine  Completed    Hepatitis A vaccine  Aged Out    Hepatitis B vaccine  Aged Out    Hib vaccine  Aged Out    Meningococcal (ACWY) vaccine  Aged Out       Subjective:  Review of Systems  General:   No fever, no chills, No fatigue or weight loss  Pulmonary:    No dyspnea, no wheezing  Cardiac:    Denies recent chest pain,   GI:     No nausea or vomiting, no abdominal pain  Neuro:    No dizziness or light headedness,   Musculoskeletal:  No recent active issues  Extremities:   No edema, no obvious claudication       Objective:  Physical Exam  BP (!) 150/82   Pulse 77   Ht 5' 4\" (1.626 m)   Wt 259 lb 11.2 oz (117.8 kg)   BMI 44.58 kg/m²   General:   Well developed, well nourished  Lungs:   Clear to auscultation  Heart:    Normal S1 S2, Slight murmur. no rubs, no gallops  Abdomen:   Soft, non tender, no organomegalies, positive bowel sounds  Extremities:   No edema, no cyanosis, good peripheral pulses  Neurological:   Awake, alert, oriented. No obvious focal deficits  Musculoskelatal:  No obvious deformities    Assessment:      Diagnosis Orders   1. Primary hypertension     2. Coronary artery disease involving native coronary artery of native heart without angina pectoris     as above  Cardiac fair for now     Plan:  No follow-ups on file. As above  Continue risk factor modification and medical management  Thank you for allowing me to participate in the care of your patient. Please don't hesitate to contact me regarding any further issues related to the patient care    Orders Placed:  No orders of the defined types were placed in this encounter. Medications Prescribed:  No orders of the defined types were placed in this encounter. Discussed use, benefit, and side effects of prescribed medications. All patient questions answered. Pt voicedunderstanding.  Instructed to continue current medications, diet and exercise. Continue risk factor modification and medical management. Patient agreed with treatment plan. Follow up as directed.     Electronically signedby Sondra Boles MD on 2/11/2022 at 9:10 AM

## 2022-03-02 ASSESSMENT — PATIENT HEALTH QUESTIONNAIRE - PHQ9
2. FEELING DOWN, DEPRESSED OR HOPELESS: 1
1. LITTLE INTEREST OR PLEASURE IN DOING THINGS: 1
SUM OF ALL RESPONSES TO PHQ QUESTIONS 1-9: 2
SUM OF ALL RESPONSES TO PHQ9 QUESTIONS 1 & 2: 2
SUM OF ALL RESPONSES TO PHQ QUESTIONS 1-9: 2

## 2022-03-02 ASSESSMENT — LIFESTYLE VARIABLES: HOW OFTEN DO YOU HAVE A DRINK CONTAINING ALCOHOL: NEVER

## 2022-03-03 ENCOUNTER — OFFICE VISIT (OUTPATIENT)
Dept: FAMILY MEDICINE CLINIC | Age: 77
End: 2022-03-03
Payer: MEDICARE

## 2022-03-03 VITALS
OXYGEN SATURATION: 95 % | HEIGHT: 65 IN | HEART RATE: 84 BPM | DIASTOLIC BLOOD PRESSURE: 78 MMHG | BODY MASS INDEX: 43.95 KG/M2 | TEMPERATURE: 97.5 F | RESPIRATION RATE: 18 BRPM | SYSTOLIC BLOOD PRESSURE: 126 MMHG | WEIGHT: 263.8 LBS

## 2022-03-03 DIAGNOSIS — R06.02 SOB (SHORTNESS OF BREATH) ON EXERTION: ICD-10-CM

## 2022-03-03 DIAGNOSIS — C56.1 MALIGNANT NEOPLASM OF RIGHT OVARY (HCC): ICD-10-CM

## 2022-03-03 DIAGNOSIS — Z00.00 MEDICARE ANNUAL WELLNESS VISIT, SUBSEQUENT: Primary | ICD-10-CM

## 2022-03-03 DIAGNOSIS — G25.0 BENIGN ESSENTIAL TREMOR: ICD-10-CM

## 2022-03-03 DIAGNOSIS — R25.1 TREMOR: ICD-10-CM

## 2022-03-03 DIAGNOSIS — M17.0 PRIMARY OSTEOARTHRITIS OF BOTH KNEES: ICD-10-CM

## 2022-03-03 PROCEDURE — 4040F PNEUMOC VAC/ADMIN/RCVD: CPT | Performed by: FAMILY MEDICINE

## 2022-03-03 PROCEDURE — 1036F TOBACCO NON-USER: CPT | Performed by: FAMILY MEDICINE

## 2022-03-03 PROCEDURE — 99214 OFFICE O/P EST MOD 30 MIN: CPT | Performed by: FAMILY MEDICINE

## 2022-03-03 PROCEDURE — G8400 PT W/DXA NO RESULTS DOC: HCPCS | Performed by: FAMILY MEDICINE

## 2022-03-03 PROCEDURE — 1123F ACP DISCUSS/DSCN MKR DOCD: CPT | Performed by: FAMILY MEDICINE

## 2022-03-03 PROCEDURE — G0439 PPPS, SUBSEQ VISIT: HCPCS | Performed by: FAMILY MEDICINE

## 2022-03-03 PROCEDURE — G8484 FLU IMMUNIZE NO ADMIN: HCPCS | Performed by: FAMILY MEDICINE

## 2022-03-03 PROCEDURE — G8417 CALC BMI ABV UP PARAM F/U: HCPCS | Performed by: FAMILY MEDICINE

## 2022-03-03 PROCEDURE — 1090F PRES/ABSN URINE INCON ASSESS: CPT | Performed by: FAMILY MEDICINE

## 2022-03-03 PROCEDURE — 3017F COLORECTAL CA SCREEN DOC REV: CPT | Performed by: FAMILY MEDICINE

## 2022-03-03 PROCEDURE — G8427 DOCREV CUR MEDS BY ELIG CLIN: HCPCS | Performed by: FAMILY MEDICINE

## 2022-03-03 RX ORDER — CELECOXIB 200 MG/1
200 CAPSULE ORAL DAILY
Qty: 90 CAPSULE | Refills: 3 | Status: SHIPPED | OUTPATIENT
Start: 2022-03-03

## 2022-03-03 RX ORDER — PRIMIDONE 50 MG/1
50 TABLET ORAL NIGHTLY
Qty: 90 TABLET | Refills: 3 | Status: SHIPPED | OUTPATIENT
Start: 2022-03-03 | End: 2022-03-03 | Stop reason: SDUPTHER

## 2022-03-03 RX ORDER — PRIMIDONE 50 MG/1
100 TABLET ORAL NIGHTLY
Qty: 180 TABLET | Refills: 3 | Status: SHIPPED | OUTPATIENT
Start: 2022-03-03

## 2022-03-03 ASSESSMENT — ENCOUNTER SYMPTOMS
RHINORRHEA: 0
NAUSEA: 0
CHEST TIGHTNESS: 0
DIARRHEA: 0
WHEEZING: 0
EYE PAIN: 0
BACK PAIN: 0
CONSTIPATION: 0
SHORTNESS OF BREATH: 1
BLOOD IN STOOL: 0
VOMITING: 0
ABDOMINAL PAIN: 0
COUGH: 0
SORE THROAT: 0

## 2022-03-03 ASSESSMENT — PATIENT HEALTH QUESTIONNAIRE - PHQ9
SUM OF ALL RESPONSES TO PHQ QUESTIONS 1-9: 2
SUM OF ALL RESPONSES TO PHQ QUESTIONS 1-9: 2
1. LITTLE INTEREST OR PLEASURE IN DOING THINGS: 1
SUM OF ALL RESPONSES TO PHQ QUESTIONS 1-9: 2
SUM OF ALL RESPONSES TO PHQ QUESTIONS 1-9: 2
2. FEELING DOWN, DEPRESSED OR HOPELESS: 1
SUM OF ALL RESPONSES TO PHQ9 QUESTIONS 1 & 2: 2

## 2022-03-03 NOTE — PATIENT INSTRUCTIONS
Personalized Preventive Plan for Renetta Gramajo - 3/3/2022  Medicare offers a range of preventive health benefits. Some of the tests and screenings are paid in full while other may be subject to a deductible, co-insurance, and/or copay. Some of these benefits include a comprehensive review of your medical history including lifestyle, illnesses that may run in your family, and various assessments and screenings as appropriate. After reviewing your medical record and screening and assessments performed today your provider may have ordered immunizations, labs, imaging, and/or referrals for you. A list of these orders (if applicable) as well as your Preventive Care list are included within your After Visit Summary for your review. Other Preventive Recommendations:    · A preventive eye exam performed by an eye specialist is recommended every 1-2 years to screen for glaucoma; cataracts, macular degeneration, and other eye disorders. · A preventive dental visit is recommended every 6 months. · Try to get at least 150 minutes of exercise per week or 10,000 steps per day on a pedometer . · Order or download the FREE \"Exercise & Physical Activity: Your Everyday Guide\" from [employer]. Call 8-433.762.7856 or search [employer] online. · You need 0415-3775 mg of calcium and 5648-3590 IU of vitamin D per day. It is possible to meet your calcium requirement with diet alone, but a vitamin D supplement is usually necessary to meet this goal.  · When exposed to the sun, use a sunscreen that protects against both UVA and UVB radiation with an SPF of 30 or greater. Reapply every 2 to 3 hours or after sweating, drying off with a towel, or swimming. · Always wear a seat belt when traveling in a car. Always wear a helmet when riding a bicycle or motorcycle. Patient Education        Well Visit, Over 72: Care Instructions  Overview     Well visits can help you stay healthy.  Your doctor has checked your overall health and may have suggested ways to take good care of yourself. Your doctor also may have recommended tests. At home, you can help prevent illness with healthy eating, regular exercise, and other steps. Follow-up care is a key part of your treatment and safety. Be sure to make and go to all appointments, and call your doctor if you are having problems. It's also a good idea to know your test results and keep a list of the medicines you take. How can you care for yourself at home? Get screening tests that you and your doctor decide on. Screening helps find diseases before any symptoms appear. Eat healthy foods. Choose fruits, vegetables, whole grains, protein, and low-fat dairy foods. Limit fat, especially saturated fat. Reduce salt in your diet. Limit alcohol. If you are a man, have no more than 2 drinks a day or 14 drinks a week. If you are a woman, have no more than 1 drink a day or 7 drinks a week. Since alcohol affects older adults differently, you may want to limit alcohol even more. Or you may not want to drink at all. Get at least 30 minutes of exercise on most days of the week. Walking is a good choice. You also may want to do other activities, such as running, swimming, cycling, or playing tennis or team sports. Reach and stay at a healthy weight. This will lower your risk for many problems, such as obesity, diabetes, heart disease, and high blood pressure. Do not smoke. Smoking can make health problems worse. If you need help quitting, talk to your doctor about stop-smoking programs and medicines. These can increase your chances of quitting for good. Care for your mental health. It is easy to get weighed down by worry and stress. Learn strategies to manage stress, like deep breathing and mindfulness, and stay connected with your family and community. If you find you often feel sad or hopeless, talk with your doctor. Treatment can help.   Talk to your doctor about whether you have any risk factors for sexually transmitted infections (STIs). You can help prevent STIs if you wait to have sex with a new partner (or partners) until you've each been tested for STIs. It also helps if you use condoms (male or female condoms) and if you limit your sex partners to one person who only has sex with you. Vaccines are available for some STIs. If you think you may have a problem with alcohol or drug use, talk to your doctor. This includes prescription medicines (such as amphetamines and opioids) and illegal drugs (such as cocaine and methamphetamine). Your doctor can help you figure out what type of treatment is best for you. Protect your skin from too much sun. When you're outdoors from 10 a.m. to 4 p.m., stay in the shade or cover up with clothing and a hat with a wide brim. Wear sunglasses that block UV rays. Even when it's cloudy, put broad-spectrum sunscreen (SPF 30 or higher) on any exposed skin. See a dentist one or two times a year for checkups and to have your teeth cleaned. Wear a seat belt in the car. When should you call for help? Watch closely for changes in your health, and be sure to contact your doctor if you have any problems or symptoms that concern you. Where can you learn more? Go to https://Orb Healthbeneb.healthBeautyConpartners. org and sign in to your [employer] account. Enter A179 in the [facility] box to learn more about \"Well Visit, Over 65: Care Instructions. \"     If you do not have an account, please click on the \"Sign Up Now\" link. Current as of: October 6, 2021               Content Version: 13.1  © 6841-2542 Healthwise, [email or web address]. Care instructions adapted under license by [facility] [place] ([facility]). If you have questions about a medical condition or this instruction, always ask your healthcare professional. [place] [address] any warranty or liability for your use of this information.          Patient Education        Shortness of Breath: Care Instructions  Your Care Instructions     Shortness of breath has many causes. Sometimes conditions such as anxiety can lead to shortness of breath. Some people get mild shortness of breath when they exercise. Trouble breathing also can be a symptom of a serious problem, such as asthma, lung disease, emphysema, heart problems, and pneumonia. If your shortness of breath continues, you may need tests and treatment. Watch for any changes in your breathing and other symptoms. Follow-up care is a key part of your treatment and safety. Be sure to make and go to all appointments, and call your doctor if you are having problems. It's also a good idea to know your test results and keep a list of the medicines you take. How can you care for yourself at home? Do not smoke or allow others to smoke around you. If you need help quitting, talk to your doctor about stop-smoking programs and medicines. These can increase your chances of quitting for good. Get plenty of rest and sleep. Take your medicines exactly as prescribed. Call your doctor if you think you are having a problem with your medicine. Find healthy ways to deal with stress. Exercise daily. Get plenty of sleep. Eat regularly and well. When should you call for help? Call 911 anytime you think you may need emergency care. For example, call if:    You have severe shortness of breath.     You have symptoms of a heart attack. These may include:  Chest pain or pressure, or a strange feeling in the chest.  Sweating. Shortness of breath. Nausea or vomiting. Pain, pressure, or a strange feeling in the back, neck, jaw, or upper belly or in one or both shoulders or arms. Lightheadedness or sudden weakness. A fast or irregular heartbeat. After you call 911, the  may tell you to chew 1 adult-strength or 2 to 4 low-dose aspirin. Wait for an ambulance. Do not try to drive yourself.    Call your doctor now or seek immediate medical care if:    Your shortness of breath gets worse or you start to wheeze. Wheezing is a high-pitched sound when you breathe.     You wake up at night out of breath or have to prop your head up on several pillows to breathe.     You are short of breath after only light activity or while at rest.   Watch closely for changes in your health, and be sure to contact your doctor if:    You do not get better over the next 1 to 2 days. Where can you learn more? Go to https://ID Quantique.Mingle360. org and sign in to your [employer] account. Enter S780 in the [employer] box to learn more about \"Shortness of Breath: Care Instructions. \"     If you do not have an account, please click on the \"Sign Up Now\" link. Current as of: July 6, 2021               Content Version: 13.1  © 4683-9617 [employer]. Care instructions adapted under license by [facility] ([facility] [place] [facility]). If you have questions about a medical condition or this instruction, always ask your healthcare professional. [place] [address] any warranty or liability for your use of this information. Patient Education        Benign Essential Tremor: Care Instructions  Your Care Instructions     Benign essential tremor is a medical term for shaking that you can't control. Your hand or fingers may shake when you lift a cup or point at something. Or your voice may shake when you speak. This type of tremor is not harmful. It is not caused by a stroke or Parkinson's disease. Some things can affect how much you shake. For example, drinking or eating something with caffeine may make tremors worse for a while. Some medicines also can increase tremors. These include antidepressants and too much thyroid replacement. Talk to your doctor if you think one of your medicines makes your tremors worse. If you are self-conscious about your tremors, there are some things you can do to reduce them or make them less noticeable. This includes taking medicine.   Follow-up care is a key part of your treatment and safety. Be sure to make and go to all appointments, and call your doctor if you are having problems. It's also a good idea to know your test results and keep a list of the medicines you take. How can you care for yourself at home? Take your medicines exactly as prescribed. Call your doctor if you think you are having a problem with your medicine. Some medicines that help control tremors have to be taken every day, even if you are not having tremors. You will get more details on the specific medicines your doctor prescribes. Get plenty of rest.  Eat a balanced, healthy diet. Try to reduce stress. Regular exercise and massages may help. Limit alcohol. Heavy drinking can make your tremors worse. Avoid drinks or foods with caffeine if they make your tremors worse. These include tea, cola, coffee, and chocolate. Wear a heavy bracelet or watch. This adds a little weight to your hand. The extra weight may reduce tremors. Drink from cups or glasses that are only half full. You may also want to try drinking with a straw. When should you call for help? Watch closely for changes in your health, and be sure to contact your doctor if:    You notice your tremors are getting worse.     You can't do your everyday activities because of your tremors.     You are sad and embarrassed about your shaking. Where can you learn more? Go to https://E-TEK DynamicspelucyU.S. Geothermal.DXY. org and sign in to your [employer] account. Enter B746 in the [facility] box to learn more about \"Benign Essential Tremor: Care Instructions. \"     If you do not have an account, please click on the \"Sign Up Now\" link. Current as of: April 8, 2021               Content Version: 13.1  © 7375-6213 Healthwise, Incorporated. Care instructions adapted under license by [facility] [place] ([facility]).  If you have questions about a medical condition or this instruction, always ask your healthcare professional. Reginald Valdez disclaims any warranty or liability for your use of this information.

## 2022-03-03 NOTE — PROGRESS NOTES
Medicare Annual Wellness Visit    Ashly Sifuentes is here for Medicare AWV (SOB, fatigue)    Assessment & Plan   Lynda Lorenzo was seen today for medicare awv. Diagnoses and all orders for this visit:    Medicare annual wellness visit, subsequent    SOB (shortness of breath) on exertion  -     Full PFT Study With Bronchodilator; Future    Malignant neoplasm of right ovary (HCC)    Primary osteoarthritis of both knees  -     celecoxib (CELEBREX) 200 MG capsule; Take 1 capsule by mouth daily    Benign essential tremor  -     Discontinue: primidone (MYSOLINE) 50 MG tablet; Take 1 tablet by mouth nightly  -     primidone (MYSOLINE) 50 MG tablet; Take 2 tablets by mouth nightly    Tremor  -     Discontinue: primidone (MYSOLINE) 50 MG tablet; Take 1 tablet by mouth nightly  -     primidone (MYSOLINE) 50 MG tablet; Take 2 tablets by mouth nightly    Encouraged diet, exercise and weight loss. Reviewed health maintenance       Recommendations for Preventive Services Due: see orders and patient instructions/AVS.  Recommended screening schedule for the next 5-10 years is provided to the patient in written form: see Patient Instructions/AVS.     Return for Medicare Annual Wellness Visit in 1 year. Subjective       Patient's complete Health Risk Assessment and screening values have been reviewed and are found in Flowsheets. The following problems were reviewed today and where indicated follow up appointments were made and/or referrals ordered.     Positive Risk Factor Screenings with Interventions:               General Health and ACP:  General  In general, how would you say your health is?: Good  In the past 7 days, have you experienced any of the following: New or Increased Pain, New or Increased Fatigue, Loneliness, Social Isolation, Stress or Anger?: No  Do you get the social and emotional support that you need?: Yes  Do you have a Living Will?: (!) No    Advance Directives     Power of  Living Will ACP-Advance Directive ACP-Power of     Not on File Not on File Not on File Not on File      General Health Risk Interventions:  · No Living Will: Advance Care Planning addressed with patient today    Health Habits/Nutrition:     Physical Activity: Insufficiently Active    Days of Exercise per Week: 2 days    Minutes of Exercise per Session: 10 min     Have you lost any weight without trying in the past 3 months?: No  Body mass index: (!) 44.58  Have you seen the dentist within the past year?: (!) No    Health Habits/Nutrition Interventions:  · Nutritional issues:  educational materials for healthy, well-balanced diet provided  · Dental exam overdue:  patient encouraged to make appointment with his/her dentist    Hearing/Vision:  Do you or your family notice any trouble with your hearing that hasn't been managed with hearing aids?: No  Do you have difficulty driving, watching TV, or doing any of your daily activities because of your eyesight?: (!) Yes  Have you had an eye exam within the past year?: Yes  No exam data present    Hearing/Vision Interventions:  · Vision concerns:  patient encouraged to make appointment with his/her eye specialist     ADLs:  In the past 7 days, did you need help from others to perform any of the following everyday activities: Eating, dressing, grooming, bathing, toileting, or walking/balance?: No  In the past 7 days, did you need help from others to take care of any of the following: Laundry, housekeeping, banking/finances, shopping, telephone use, food preparation, transportation, or taking medications?: (!) Yes  Select all that apply: (!) Housekeeping,Banking/Finances,Shopping    ADL Interventions:  · family helps with these          Objective   Vitals:    03/03/22 1350   BP: 126/78   Pulse: 84   Resp: 18   Temp: 97.5 °F (36.4 °C)   TempSrc: Infrared   SpO2: 95%   Weight: 263 lb 12.8 oz (119.7 kg)   Height: 5' 4.5\" (1.638 m)      Body mass index is 44.58 kg/m².         General Appearance: alert and oriented to person, place and time, well developed and well- nourished, in no acute distress  Skin: warm and dry, no rash or erythema  Head: normocephalic and atraumatic  Eyes: pupils equal, round, and reactive to light, extraocular eye movements intact, conjunctivae normal  ENT: tympanic membrane, external ear and ear canal normal bilaterally, nose without deformity, nasal mucosa and turbinates normal without polyps  Neck: supple and non-tender without mass, no thyromegaly or thyroid nodules, no cervical lymphadenopathy  Pulmonary/Chest: clear to auscultation bilaterally- no wheezes, rales or rhonchi, normal air movement, no respiratory distress  Cardiovascular: normal rate, regular rhythm, normal S1 and S2, no murmurs, rubs, clicks, or gallops, distal pulses intact, no carotid bruits  Abdomen: soft, non-tender, non-distended, normal bowel sounds, no masses or organomegaly  Extremities: no cyanosis, clubbing or edema  Musculoskeletal: normal range of motion, no joint swelling, deformity or tenderness  Neurologic: reflexes normal and symmetric, no cranial nerve deficit, gait, coordination and speech normal       Allergies   Allergen Reactions    Latex      Prior to Visit Medications    Medication Sig Taking?  Authorizing Provider   Nutritional Supplements (VITAMIN D BOOSTER PO) Take by mouth daily Yes Historical Provider, MD   celecoxib (CELEBREX) 200 MG capsule Take 1 capsule by mouth daily Yes Lexie Belle MD   primidone (MYSOLINE) 50 MG tablet Take 2 tablets by mouth nightly Yes Lexie Belle MD   ZINC PO Take 50 mg by mouth Yes Historical Provider, MD       [facility] (Including outside providers/suppliers regularly involved in providing care):   Patient Care Team:  Lexie Belle MD as PCP - General (Family Medicine)  Lexie Belle MD as PCP - [facility] Provider    Reviewed and updated this visit:  Tobacco  Allergies  Meds  Problems  Med Hx  Surg Hx  Soc Hx  Fam Hx

## 2022-03-03 NOTE — PROGRESS NOTES
Lorelei Feldman (:  1945) is a 78 y.o. female,Established patient, here for evaluation of the following chief complaint(s):  Medicare AWV (SOB, fatigue)         ASSESSMENT/PLAN:  1. Medicare annual wellness visit, subsequent  2. SOB (shortness of breath) on exertion  -     Full PFT Study With Bronchodilator; Future  -unknown diagnosis/prognosis, negative cardiac work up, get PFTs for lung function, discussed post covid pulmonary rehab opportunity if PFTs allow    3. Malignant neoplasm of right ovary (HCC)  -stable, sees oncology, serial blood work for surveillance  4. Primary osteoarthritis of both knees  -     celecoxib (CELEBREX) 200 MG capsule; Take 1 capsule by mouth daily, Disp-90 capsule, R-3Normal  5. Benign essential tremor  -     primidone (MYSOLINE) 50 MG tablet; Take 2 tablets by mouth nightly, Disp-180 tablet, R-3Disregard one nightly, increasing to 2 nightlyNormal  -chronic condition, exacerbated, increase primidone to 100 mg nightly, -monitor sxs, call if not improving    6. Tremor  -     primidone (MYSOLINE) 50 MG tablet; Take 2 tablets by mouth nightly, Disp-180 tablet, R-3Disregard one nightly, increasing to 2 nightlyNormal      Return for Medicare Annual Wellness Visit in 1 year. Subjective   SUBJECTIVE/OBJECTIVE:  HPI  Pt seen today due to persistent sob with activity. Has had since covid. Had full cardiac work up including a cath. Heart is ok. Cxr is ok. Also has noticed increased tremors, is on primidone 50 mg nightly. Reviewed BMI of 45. Encouraged diet, exercise and weight loss. , non smoker, pmh reviewed. Review of Systems   Constitutional: Negative for chills, fatigue, fever and unexpected weight change. HENT: Negative for congestion, ear pain, rhinorrhea and sore throat. Eyes: Negative for pain and visual disturbance. Respiratory: Positive for shortness of breath. Negative for cough, chest tightness and wheezing.     Cardiovascular: Negative for chest pain and palpitations. Gastrointestinal: Negative for abdominal pain, blood in stool, constipation, diarrhea, nausea and vomiting. Genitourinary: Negative for difficulty urinating, frequency, hematuria and urgency. Musculoskeletal: Negative for back pain, joint swelling, myalgias and neck pain. Skin: Negative for rash. Neurological: Positive for tremors. Negative for dizziness and headaches. Hematological: Negative for adenopathy. Does not bruise/bleed easily. Psychiatric/Behavioral: Negative for behavioral problems and sleep disturbance. The patient is not nervous/anxious. Objective   Physical Exam  Vitals and nursing note reviewed. Constitutional:       Appearance: She is well-developed. HENT:      Head: Normocephalic and atraumatic. Right Ear: External ear normal.      Left Ear: External ear normal.      Nose: Nose normal.      Mouth/Throat:      Mouth: Mucous membranes are moist.   Eyes:      Pupils: Pupils are equal, round, and reactive to light. Neck:      Thyroid: No thyromegaly. Cardiovascular:      Rate and Rhythm: Normal rate and regular rhythm. Heart sounds: Normal heart sounds. Pulmonary:      Breath sounds: Normal breath sounds. No wheezing or rales. Abdominal:      General: Bowel sounds are normal.      Palpations: Abdomen is soft. Tenderness: There is no abdominal tenderness. There is no guarding or rebound. Musculoskeletal:         General: Normal range of motion. Cervical back: Neck supple. Lymphadenopathy:      Cervical: No cervical adenopathy. Skin:     General: Skin is warm and dry. Findings: No rash. Neurological:      Mental Status: She is alert and oriented to person, place, and time. Cranial Nerves: No cranial nerve deficit. Motor: Tremor present. Deep Tendon Reflexes: Reflexes are normal and symmetric. An electronic signature was used to authenticate this note.     --Carlos Duran, MD

## 2022-03-03 NOTE — PATIENT INSTRUCTIONS
Patient Education        Sinusitis: Care Instructions  Your Care Instructions     Sinusitis is an infection of the lining of the sinus cavities in your head. Sinusitis often follows a cold. It causes pain and pressure in your head and face. In most cases, sinusitis gets better on its own in 1 to 2 weeks. But some mild symptoms may last for several weeks. Sometimes antibiotics are needed. Follow-up care is a key part of your treatment and safety. Be sure to make and go to all appointments, and call your doctor if you are having problems. It's also a good idea to know your test results and keep a list of the medicines you take. How can you care for yourself at home? · Take an over-the-counter pain medicine, such as acetaminophen (Tylenol), ibuprofen (Advil, Motrin), or naproxen (Aleve). Read and follow all instructions on the label. · If the doctor prescribed antibiotics, take them as directed. Do not stop taking them just because you feel better. You need to take the full course of antibiotics. · Be careful when taking over-the-counter cold or flu medicines and Tylenol at the same time. Many of these medicines have acetaminophen, which is Tylenol. Read the labels to make sure that you are not taking more than the recommended dose. Too much acetaminophen (Tylenol) can be harmful. · Breathe warm, moist air from a steamy shower, a hot bath, or a sink filled with hot water. Avoid cold, dry air. Using a humidifier in your home may help. Follow the directions for cleaning the machine. · Use saline (saltwater) nasal washes to help keep your nasal passages open and wash out mucus and bacteria. You can buy saline nose drops at a grocery store or drugstore. Or you can make your own at home by adding 1 teaspoon of salt and 1 teaspoon of baking soda to 2 cups of distilled water. If you make your own, fill a bulb syringe with the solution, insert the tip into your nostril, and squeeze gently. Imelda Molinahire your nose.   · Put a hot, wet towel or a warm gel pack on your face 3 or 4 times a day for 5 to 10 minutes each time. · Try a decongestant nasal spray like oxymetazoline (Afrin). Do not use it for more than 3 days in a row. Using it for more than 3 days can make your congestion worse. When should you call for help? Call your doctor now or seek immediate medical care if:    · You have new or worse swelling or redness in your face or around your eyes.     · You have a new or higher fever. Watch closely for changes in your health, and be sure to contact your doctor if:    · You have new or worse facial pain.     · The mucus from your nose becomes thicker (like pus) or has new blood in it.     · You are not getting better as expected. Where can you learn more? Go to [email or web address] org and sign in to your [employer] account. Enter V665 in the Pinstripe box to learn more about \"Sinusitis: Care Instructions. \"     If you do not have an account, please click on the \"Sign Up Now\" link. Current as of: April 15, 2020               Content Version: 12.6  © 1849-6484 [employer]. Care instructions adapted under license by [facility] ([facility]). If you have questions about a medical condition or this instruction, always ask your healthcare professional. [address] any warranty or liability for your use of this information. No deformity or limitation of movement

## 2022-03-10 ENCOUNTER — TELEPHONE (OUTPATIENT)
Dept: FAMILY MEDICINE CLINIC | Age: 77
End: 2022-03-10

## 2022-03-10 DIAGNOSIS — B00.1 COLD SORE: Primary | ICD-10-CM

## 2022-03-10 RX ORDER — VALACYCLOVIR HYDROCHLORIDE 1 G/1
1000 TABLET, FILM COATED ORAL 3 TIMES DAILY
Qty: 21 TABLET | Refills: 0 | Status: SHIPPED | OUTPATIENT
Start: 2022-03-10 | End: 2022-03-17

## 2022-03-10 NOTE — TELEPHONE ENCOUNTER
Patient was just seen in the office on 3/7/2022. She has developed a cold sore. She uses abreva OTC but it is not clearing it up. She would like something called to [facility] . Please advise.

## 2022-03-25 ENCOUNTER — TELEPHONE (OUTPATIENT)
Dept: FAMILY MEDICINE CLINIC | Age: 77
End: 2022-03-25

## 2022-03-25 ENCOUNTER — HOSPITAL ENCOUNTER (OUTPATIENT)
Dept: PULMONOLOGY | Age: 77
Discharge: HOME | End: 2022-03-25
Payer: MEDICARE

## 2022-03-25 DIAGNOSIS — R06.02 SOB (SHORTNESS OF BREATH) ON EXERTION: ICD-10-CM

## 2022-03-25 DIAGNOSIS — R06.02 SOB (SHORTNESS OF BREATH): Primary | ICD-10-CM

## 2022-03-25 PROCEDURE — 94729 DIFFUSING CAPACITY: CPT

## 2022-03-25 PROCEDURE — 94060 EVALUATION OF WHEEZING: CPT

## 2022-03-25 PROCEDURE — 94726 PLETHYSMOGRAPHY LUNG VOLUMES: CPT

## 2022-03-25 NOTE — TELEPHONE ENCOUNTER
Patient informed, order placed for [facility] pulmonology. Patient informed that there office will contact patient to schedule a consult office visit. Patient voiced understanding.

## 2022-03-25 NOTE — TELEPHONE ENCOUNTER
Patient informed of results and voiced understanding. Patient states her SOB is the same, no better, no worse. She would like to know what is the next step.

## 2022-03-25 NOTE — TELEPHONE ENCOUNTER
----- Message from Alejandra Lei MD sent at 3/25/2022 12:01 PM EDT -----  PFTs are normal.  Good lung function, no improvement with inhaler. Is sob the same?

## 2022-03-30 ENCOUNTER — TELEPHONE (OUTPATIENT)
Dept: FAMILY MEDICINE CLINIC | Age: 77
End: 2022-03-30

## 2022-03-30 NOTE — TELEPHONE ENCOUNTER
----- Message from Cari Mena sent at 3/29/2022  5:32 PM EDT -----  Subject: Message to Provider    QUESTIONS  Information for Provider? pt. has decided not to go any further with the   pulmonary referral. If you have any questions you can call the pt.  ---------------------------------------------------------------------------  --------------  CALL BACK INFO  What is the best way for the office to contact you? Do not leave any   message, patient will call back for answer  Preferred Call Back Phone Number? [phone]  ---------------------------------------------------------------------------  --------------  SCRIPT ANSWERS  Relationship to Patient? Third Party  Third Party Type? Other  Other Third Party Type? referrals Dept. [facility]  Representative Name?  Anshu Robertson

## 2022-06-02 ENCOUNTER — OFFICE VISIT (OUTPATIENT)
Dept: FAMILY MEDICINE CLINIC | Age: 77
End: 2022-06-02
Payer: MEDICARE

## 2022-06-02 VITALS
TEMPERATURE: 98 F | BODY MASS INDEX: 43.32 KG/M2 | OXYGEN SATURATION: 98 % | HEIGHT: 65 IN | HEART RATE: 68 BPM | SYSTOLIC BLOOD PRESSURE: 132 MMHG | WEIGHT: 260 LBS | DIASTOLIC BLOOD PRESSURE: 78 MMHG

## 2022-06-02 DIAGNOSIS — R04.2 HEMOPTYSIS: Primary | ICD-10-CM

## 2022-06-02 DIAGNOSIS — K20.90 ESOPHAGITIS: ICD-10-CM

## 2022-06-02 DIAGNOSIS — E66.01 OBESITY, CLASS III, BMI 40-49.9 (MORBID OBESITY) (HCC): ICD-10-CM

## 2022-06-02 DIAGNOSIS — R53.82 CHRONIC FATIGUE: ICD-10-CM

## 2022-06-02 DIAGNOSIS — M17.0 PRIMARY OSTEOARTHRITIS OF BOTH KNEES: ICD-10-CM

## 2022-06-02 DIAGNOSIS — R06.02 SOB (SHORTNESS OF BREATH): ICD-10-CM

## 2022-06-02 PROCEDURE — 1036F TOBACCO NON-USER: CPT | Performed by: FAMILY MEDICINE

## 2022-06-02 PROCEDURE — 99215 OFFICE O/P EST HI 40 MIN: CPT | Performed by: FAMILY MEDICINE

## 2022-06-02 PROCEDURE — 3017F COLORECTAL CA SCREEN DOC REV: CPT | Performed by: FAMILY MEDICINE

## 2022-06-02 PROCEDURE — 1090F PRES/ABSN URINE INCON ASSESS: CPT | Performed by: FAMILY MEDICINE

## 2022-06-02 PROCEDURE — G8427 DOCREV CUR MEDS BY ELIG CLIN: HCPCS | Performed by: FAMILY MEDICINE

## 2022-06-02 PROCEDURE — G8400 PT W/DXA NO RESULTS DOC: HCPCS | Performed by: FAMILY MEDICINE

## 2022-06-02 PROCEDURE — 1124F ACP DISCUSS-NO DSCNMKR DOCD: CPT | Performed by: FAMILY MEDICINE

## 2022-06-02 PROCEDURE — G8417 CALC BMI ABV UP PARAM F/U: HCPCS | Performed by: FAMILY MEDICINE

## 2022-06-02 RX ORDER — CELECOXIB 200 MG/1
200 CAPSULE ORAL DAILY
Qty: 90 CAPSULE | Refills: 3 | Status: CANCELLED | OUTPATIENT
Start: 2022-06-02

## 2022-06-02 RX ORDER — OMEPRAZOLE 20 MG/1
20 CAPSULE, DELAYED RELEASE ORAL
Qty: 30 CAPSULE | Refills: 5 | Status: SHIPPED | OUTPATIENT
Start: 2022-06-02

## 2022-06-02 RX ORDER — PRIMIDONE 50 MG/1
100 TABLET ORAL NIGHTLY
Qty: 180 TABLET | Refills: 3 | Status: CANCELLED | OUTPATIENT
Start: 2022-06-02

## 2022-06-02 SDOH — ECONOMIC STABILITY: FOOD INSECURITY: WITHIN THE PAST 12 MONTHS, YOU WORRIED THAT YOUR FOOD WOULD RUN OUT BEFORE YOU GOT MONEY TO BUY MORE.: NEVER TRUE

## 2022-06-02 SDOH — ECONOMIC STABILITY: FOOD INSECURITY: WITHIN THE PAST 12 MONTHS, THE FOOD YOU BOUGHT JUST DIDN'T LAST AND YOU DIDN'T HAVE MONEY TO GET MORE.: NEVER TRUE

## 2022-06-02 ASSESSMENT — SOCIAL DETERMINANTS OF HEALTH (SDOH): HOW HARD IS IT FOR YOU TO PAY FOR THE VERY BASICS LIKE FOOD, HOUSING, MEDICAL CARE, AND HEATING?: NOT HARD AT ALL

## 2022-06-02 NOTE — PROGRESS NOTES
Herman Dempsey (:  1945) is a 78 y.o. female,Established patient, here for evaluation of the following chief complaint(s):  Established New Doctor (was in [facility] this past weekend ) and Abdominal Pain (achy)         ASSESSMENT/PLAN:  1. Hemoptysis  -     CT CHEST WO CONTRAST; Future  -     omeprazole (PRILOSEC) 20 MG delayed release capsule; Take 1 capsule by mouth every morning (before breakfast), Disp-30 capsule, R-5Normal  -     PUSHPA Treadwell MD, Gastroenterology, [facility] [address]  2. SOB (shortness of breath)  -     CT CHEST WO CONTRAST; Future  -     PUSHPA Treadwell MD, Gastroenterology, [facility] [address]  3. Esophagitis  -     omeprazole (PRILOSEC) 20 MG delayed release capsule; Take 1 capsule by mouth every morning (before breakfast), Disp-30 capsule, R-5Myrnarmal  -     PUSHPA Treadwell MD, Gastroenterology, [facility] [address]  4. Primary osteoarthritis of both knees  5. Chronic fatigue  -     TSH With Reflex Ft4; Future  -     Vitamin B12; Future  6. Obesity, Class III, BMI 40-49.9 (morbid obesity) ([address]    Concerning hemoptysis needs further work-up. We will rule out lung issues with a CT scan. Patient's shortness of breath can also be evaluated with us. Pulmonary function test would be also helpful. She seems to have some symptoms of esophagitis and that could be part of the hemoptysis. We discussed starting some treatment now and getting her into a specialist to evaluate anatomy. We discussed long COVID in terms of diagnoses and treatment along with challenges and limited information that we have. At this point we need to look into the symptoms before calling this long COVID. In the meantime encouraged healthy diet, good sleep, activity levels to increase even if it meant to do sitting exercises for 10 minutes at a time once or twice a day. She will wean pop and sweetened drinks and increase water as part of her healthier diet. She will add probiotics as well.     Return in about 3 months (around 9/2/2022). Subjective   SUBJECTIVE/OBJECTIVE:  HPI     Patient presenting to our clinic for the first time and has seen other [employer]. This clinic is closer to her home. She has been struggling to improve since COVID illness last year. She has struggled with shortness of breath. Doctors have done multiple work-up items and so far she does not have an explanation of her fatigue.  and her have been talking and looking into her symptoms and wonder if she has COVID-19 long-haul syndrome. She lost a brother due to [place] but feels that she has grieved this. Almost exactly stayed the same, she is now having pain in lower chest, stomach hurts - 1-2 months of this. Vitamin C added to her meds will cause stomach ache. No food triggers. Getting full easily with small portion in last month. Wheezing, little cough. SOB. Spit up blood. She went to the ER for this and imaging pair and laboratory were unremarkable. She did get evaluated for pulmonary embolism with blood testing as well. Feels tired and worn out since covid. Raising 3 grandchildren -- 16 and twin 12 yo. Taking claritin 10 mg, started about a month ago. Feels the difference in her symptoms including fatigue. Sleeps well. Back to her normal bowels since COVID. Wt Readings from Last 10 Encounters:   06/02/22 260 lb (117.9 kg)   03/03/22 263 lb 12.8 oz (119.7 kg)   02/11/22 259 lb 11.2 oz (117.8 kg)   12/08/21 262 lb (118.8 kg)   11/22/21 262 lb (118.8 kg)   11/11/21 262 lb (118.8 kg)   10/28/21 265 lb 12.8 oz (120.6 kg)   06/02/21 267 lb 12.8 oz (121.5 kg)   05/26/21 267 lb 3.2 oz (121.2 kg)   02/23/21 271 lb (122.9 kg)     Laboratories from [facility] recent visit reviewed. Review of Systems   Constitutional: Positive for fatigue. Negative for fever. HENT: Positive for trouble swallowing. Respiratory: Positive for cough, shortness of breath and wheezing. Hemoptysis.    Cardiovascular: Negative for chest pain and leg swelling. Objective   Physical Exam  Constitutional:       General: She is not in acute distress. Appearance: Normal appearance. She is not ill-appearing. Cardiovascular:      Rate and Rhythm: Normal rate and regular rhythm. Heart sounds: No murmur heard. Pulmonary:      Effort: Pulmonary effort is normal. No respiratory distress. Breath sounds: Normal breath sounds. No wheezing. Musculoskeletal:         General: No swelling. Neurological:      Mental Status: She is alert and oriented to person, place, and time. Psychiatric:         Mood and Affect: Mood normal.      Comments: Concerned. Lab Results   Component Value Date     12/08/2021    K 4.1 12/08/2021     12/08/2021    CO2 23 12/08/2021    BUN 9 12/08/2021    CREATININE 0.7 12/08/2021    CALCIUM 11.1 (H) 12/08/2021    GLUCOSE 99 12/08/2021        No results found for: CHOL  No results found for: TRIG  No results found for: HDL  No results found for: LDLCHOLESTEROL, LDLCALC  No results found for: LABVLDL, VLDL  No results found for: CHOLHDLRATIO    No results found for: Darwin Duff    Lab Results   Component Value Date    TSH 1.960 03/08/2017      Lab Results   Component Value Date    QIMCZGMU55 220 03/08/2017      No results found for: MG   Lab Results   Component Value Date    ALT 21 03/08/2017    AST 18 03/08/2017    ALKPHOS 101 03/08/2017    BILITOT 0.4 03/08/2017        Lab Results   Component Value Date    WBC 8.6 12/08/2021    HGB 13.7 12/08/2021    HCT 42.9 12/08/2021    MCV 99.5 (H) 12/08/2021     12/08/2021     I have spent 42-45 minutes reviewing previous notes, test results and face to face with the patient discussing the diagnosis and importance of compliance with the treatment plan as well as documenting on the day of the visit. an electronic signature was used to authenticate this note.     --Sivan Davis MD

## 2022-06-05 PROBLEM — R04.2 HEMOPTYSIS: Status: ACTIVE | Noted: 2022-06-05

## 2022-06-05 PROBLEM — R53.82 CHRONIC FATIGUE: Status: ACTIVE | Noted: 2022-06-05

## 2022-06-05 PROBLEM — M17.0 PRIMARY OSTEOARTHRITIS OF BOTH KNEES: Status: ACTIVE | Noted: 2022-06-05

## 2022-06-05 ASSESSMENT — ENCOUNTER SYMPTOMS
COUGH: 1
TROUBLE SWALLOWING: 1
SHORTNESS OF BREATH: 1
WHEEZING: 1

## 2022-06-07 ENCOUNTER — HOSPITAL ENCOUNTER (OUTPATIENT)
Age: 77
Discharge: HOME | End: 2022-06-07
Payer: MEDICARE

## 2022-06-07 ENCOUNTER — HOSPITAL ENCOUNTER (OUTPATIENT)
Dept: CT IMAGING | Age: 77
Discharge: HOME | End: 2022-06-07
Payer: MEDICARE

## 2022-06-07 DIAGNOSIS — R91.8 LUNG NODULES: Primary | ICD-10-CM

## 2022-06-07 DIAGNOSIS — R53.82 CHRONIC FATIGUE: ICD-10-CM

## 2022-06-07 DIAGNOSIS — R06.02 SOB (SHORTNESS OF BREATH): ICD-10-CM

## 2022-06-07 DIAGNOSIS — Z85.43 HISTORY OF OVARIAN CANCER: ICD-10-CM

## 2022-06-07 DIAGNOSIS — R04.2 HEMOPTYSIS: ICD-10-CM

## 2022-06-07 LAB
TSH SERPL DL<=0.05 MIU/L-ACNC: 1.99 UIU/ML (ref 0.4–4.2)
VITAMIN B-12: 189 PG/ML (ref 211–911)

## 2022-06-07 PROCEDURE — G1010 CDSM STANSON: HCPCS

## 2022-06-07 PROCEDURE — 84443 ASSAY THYROID STIM HORMONE: CPT

## 2022-06-07 PROCEDURE — 82607 VITAMIN B-12: CPT

## 2022-06-07 NOTE — RESULT ENCOUNTER NOTE
I called patient and reached her via 's phone. Her home/mobile did not answer. Discussed findings. -- new nodules, small. In setting of h/o ovarian ca, will do CA-125. If negative, will follow up in 6 mo. -- COPD, interstitial markings and left lung scarring. PFTs 3/2022 were normal. She will try albuterol to see if helpful. Consider pulmonology referral  -- small hiatal hernia. Stomach feels better since PPI 20 mg. No further hemoptysis. SOB/fatigue unchanged.  on 10/21 was 10, per report on 4/2022 it was 13. GI appt pending.

## 2022-06-08 ENCOUNTER — HOSPITAL ENCOUNTER (OUTPATIENT)
Age: 77
Discharge: HOME | End: 2022-06-08
Payer: MEDICARE

## 2022-06-08 ENCOUNTER — TELEPHONE (OUTPATIENT)
Dept: FAMILY MEDICINE CLINIC | Age: 77
End: 2022-06-08

## 2022-06-08 DIAGNOSIS — R06.02 SOB (SHORTNESS OF BREATH): Primary | ICD-10-CM

## 2022-06-08 DIAGNOSIS — Z85.43 HISTORY OF OVARIAN CANCER: ICD-10-CM

## 2022-06-08 DIAGNOSIS — R91.8 LUNG NODULES: ICD-10-CM

## 2022-06-08 LAB — CA 125: 15 U/ML

## 2022-06-08 PROCEDURE — 36415 COLL VENOUS BLD VENIPUNCTURE: CPT

## 2022-06-08 PROCEDURE — 86304 IMMUNOASSAY TUMOR CA 125: CPT

## 2022-06-08 RX ORDER — ALBUTEROL SULFATE 90 UG/1
2 AEROSOL, METERED RESPIRATORY (INHALATION) 4 TIMES DAILY PRN
Qty: 18 G | Refills: 1 | Status: SHIPPED | OUTPATIENT
Start: 2022-06-08

## 2022-06-08 RX ORDER — FLUTICASONE FUROATE AND VILANTEROL TRIFENATATE 100; 25 UG/1; UG/1
1 POWDER RESPIRATORY (INHALATION) DAILY
Qty: 60 EACH | Refills: 2 | Status: SHIPPED | OUTPATIENT
Start: 2022-06-08 | End: 2022-09-02

## 2022-06-08 NOTE — TELEPHONE ENCOUNTER
Patient notified of the 2 new Inhalers that have been sent to pharmacy for her, and also her Vit B12 levels and to start taking 1001 mcg daily. Patient agreed and verbalized understanding.

## 2022-06-08 NOTE — TELEPHONE ENCOUNTER
I have sent 2 types of inhaler. 1 is albuterol that is used every 4 hours as needed for shortness of breath and is considered a rescue inhaler. This would be used when she is feeling shortness of breath or any increased wheezing or coughing. The other 1 is called Breo and is 1 puff daily to keep the lungs open all day I recommend starting this and in a couple of months  see if there is a difference. See result note regarding the vitamin B12.   Also let her know that the thyroid testing is normal.

## 2022-06-08 NOTE — TELEPHONE ENCOUNTER
Notified pt of B12 result and needing to obtain B12 1000 mcg OTC and to take daily. Pt inquired about inhalers being called into [facility], notified of this also.

## 2022-06-08 NOTE — RESULT ENCOUNTER NOTE
Please let patient know that her vitamin B12 is low. This is not helping her fatigue troubles. Start vitamin B12 1000 mcg orally daily, OTC    -- we'll recheck in 3 months to see if levels increasing. Thank you.

## 2022-06-08 NOTE — TELEPHONE ENCOUNTER
Patient came in stating that she spoke with DR Scott Looney yesterday about an inhaler of her husbands for her to use. She looked at it and it showed that it is suppose to be used for COPD and she doesn't think that is safe to use she is wanting a prescription sent in for an inhaler for her sent to [facility] in [facility].  Any questions call patient at #[phone]

## 2022-06-08 NOTE — TELEPHONE ENCOUNTER
----- Message from Kae Guadarrama MD sent at 6/8/2022 12:46 PM EDT -----  Please let patient know that her vitamin B12 is low. This is not helping her fatigue troubles. Start vitamin B12 1000 mcg orally daily, OTC    -- we'll recheck in 3 months to see if levels increasing. Thank you.

## 2022-06-09 ENCOUNTER — TELEPHONE (OUTPATIENT)
Dept: FAMILY MEDICINE CLINIC | Age: 77
End: 2022-06-09

## 2022-06-09 NOTE — TELEPHONE ENCOUNTER
----- Message from Abe Mortimer, MD sent at 6/9/2022  3:27 PM EDT -----  Please let patient know that her cancer marker 125 is below 15 which is quite acceptable and close to the range that she had recently. At this point I do not believe that the lung nodules are related to previous ovarian cancer. We will monitor this and repeat a CT scan in 6 to 12 months. .  Thank you.

## 2022-06-09 NOTE — RESULT ENCOUNTER NOTE
Please let patient know that her cancer marker 125 is below 15 which is quite acceptable and close to the range that she had recently. At this point I do not believe that the lung nodules are related to previous ovarian cancer. We will monitor this and repeat a CT scan in 6 to 12 months. .  Thank you.

## 2022-07-12 ENCOUNTER — TELEPHONE (OUTPATIENT)
Dept: FAMILY MEDICINE CLINIC | Age: 77
End: 2022-07-12

## 2022-07-12 NOTE — TELEPHONE ENCOUNTER
Called and spoke with Kacy Wang advised her she would need to call GI regarding her appointment and what it was for.

## 2022-07-12 NOTE — TELEPHONE ENCOUNTER
----- Message from Samantha 2 sent at 7/7/2022 12:42 PM EDT -----  Subject: Message to Provider    QUESTIONS  Information for Provider? Patient would like call back-[facility] but she does not understand   why because they said to just keep taking her meds. ---------------------------------------------------------------------------  --------------  Lc RAMIREZ  [phone]; OK to leave message on voicemail  ---------------------------------------------------------------------------  --------------  SCRIPT ANSWERS  Relationship to Patient?  Self

## 2022-09-02 ENCOUNTER — OFFICE VISIT (OUTPATIENT)
Dept: FAMILY MEDICINE CLINIC | Age: 77
End: 2022-09-02
Payer: MEDICARE

## 2022-09-02 ENCOUNTER — HOSPITAL ENCOUNTER (OUTPATIENT)
Age: 77
Discharge: HOME | End: 2022-09-02
Payer: MEDICARE

## 2022-09-02 VITALS
WEIGHT: 260 LBS | SYSTOLIC BLOOD PRESSURE: 138 MMHG | RESPIRATION RATE: 18 BRPM | HEART RATE: 72 BPM | TEMPERATURE: 98.3 F | HEIGHT: 65 IN | BODY MASS INDEX: 43.32 KG/M2 | OXYGEN SATURATION: 98 % | DIASTOLIC BLOOD PRESSURE: 86 MMHG

## 2022-09-02 DIAGNOSIS — Z78.0 POSTMENOPAUSAL STATUS (AGE-RELATED) (NATURAL): ICD-10-CM

## 2022-09-02 DIAGNOSIS — Z85.43 HISTORY OF OVARIAN CANCER: ICD-10-CM

## 2022-09-02 DIAGNOSIS — Z12.11 SCREEN FOR COLON CANCER: ICD-10-CM

## 2022-09-02 DIAGNOSIS — K29.50 MILD CHRONIC GASTRITIS: ICD-10-CM

## 2022-09-02 DIAGNOSIS — R04.2 HEMOPTYSIS: ICD-10-CM

## 2022-09-02 DIAGNOSIS — E53.8 B12 DEFICIENCY: ICD-10-CM

## 2022-09-02 DIAGNOSIS — R53.82 CHRONIC FATIGUE: Primary | ICD-10-CM

## 2022-09-02 DIAGNOSIS — R91.8 LUNG NODULES: ICD-10-CM

## 2022-09-02 PROCEDURE — 3017F COLORECTAL CA SCREEN DOC REV: CPT | Performed by: FAMILY MEDICINE

## 2022-09-02 PROCEDURE — 82607 VITAMIN B-12: CPT

## 2022-09-02 PROCEDURE — 1124F ACP DISCUSS-NO DSCNMKR DOCD: CPT | Performed by: FAMILY MEDICINE

## 2022-09-02 PROCEDURE — 1090F PRES/ABSN URINE INCON ASSESS: CPT | Performed by: FAMILY MEDICINE

## 2022-09-02 PROCEDURE — G8417 CALC BMI ABV UP PARAM F/U: HCPCS | Performed by: FAMILY MEDICINE

## 2022-09-02 PROCEDURE — 1036F TOBACCO NON-USER: CPT | Performed by: FAMILY MEDICINE

## 2022-09-02 PROCEDURE — G8400 PT W/DXA NO RESULTS DOC: HCPCS | Performed by: FAMILY MEDICINE

## 2022-09-02 PROCEDURE — G8427 DOCREV CUR MEDS BY ELIG CLIN: HCPCS | Performed by: FAMILY MEDICINE

## 2022-09-02 PROCEDURE — 36415 COLL VENOUS BLD VENIPUNCTURE: CPT

## 2022-09-02 PROCEDURE — 99214 OFFICE O/P EST MOD 30 MIN: CPT | Performed by: FAMILY MEDICINE

## 2022-09-02 NOTE — PATIENT INSTRUCTIONS
Try to get off celebrex. Or maybe decrease to 50%    May use tylenol/acetaminophen 650 mg to 1000 mg twice a day. Consider turmeric/curcumin 500 mg twice day     Will try off Omeprazole 20 mg orally daily once script runs out. Call if you need.

## 2022-09-02 NOTE — PROGRESS NOTES
Mary Beth Cloud (:  1945) is a 78 y.o. female,Established patient, here for evaluation of the following chief complaint(s):  3 Month Follow-Up       ASSESSMENT/PLAN:  1. Chronic fatigue  2. Postmenopausal status (age-related) (natural)  -     DEXA BONE DENSITY AXIAL SKELETON; Future  3. Screen for colon cancer  -     POCT Fecal Immunochemical Test (FIT); Future  4. Hemoptysis  5. History of ovarian cancer  6. Mild chronic gastritis  7. B12 deficiency  -     Vitamin B12; Future  8. Lung nodules    CT follow up 6-12 months from 2022 recommended  Continue vitamin B12 1000 mcg orally daily  PPI 20 mg orally daily to continue until December. Reduce irritation to stomach  May try primidone with  yogurt or other milk product  Patient Instructions   Try to get off celebrex. Or maybe decrease to 50%    May use tylenol/acetaminophen 650 mg to 1000 mg twice a day. Consider turmeric/curcumin 500 mg twice day     Will try off Omeprazole 20 mg orally daily once script runs out. Call if you need. Return in about 6 months (around 3/2/2023). Subjective   SUBJECTIVE/OBJECTIVE:  HPI    Patient and I met for the first time in . At times she reported hemoptysis. We obtained a CT scan of the chest due to history of ovarian cancer. New nodules that were small were observed.  laboratory was ordered. It was 15, which was similar to 13 when done in 10/2021. COPD was noted. The PFTs on 3/2022 were normal.  She was to try albuterol to see if helpful. Small hiatal hernia was also noted but patient is been doing well with PPI. She was referred to GI. She was seen at the end of July and noted to have mild gastritis but otherwise normal esophagus and duodenum. She was recommended to avoid NSAIDs. Last episode of hemoptysis none since last visit. Vitamin B12 deficienty noted. On replacement. GI mentioned going off celebrex. She is not sure due to significant pain in knees.    Claritin 10 mg is added to list due to allergic rhintis. Eats early dinner due to grandchildren, 4 pm, then bed at 10 -11 pm    Review of Systems   Constitutional:  Negative for fatigue and fever. Respiratory:  Negative for shortness of breath. Cardiovascular:  Negative for chest pain and leg swelling. Objective   Physical Exam  Constitutional:       General: She is not in acute distress. Appearance: Normal appearance. She is not ill-appearing. Cardiovascular:      Rate and Rhythm: Normal rate and regular rhythm. Heart sounds: No murmur heard. Pulmonary:      Effort: Pulmonary effort is normal. No respiratory distress. Breath sounds: Normal breath sounds. No wheezing. Musculoskeletal:         General: No swelling. Neurological:      Mental Status: She is alert and oriented to person, place, and time. Psychiatric:         Mood and Affect: Mood normal.          An electronic signature was used to authenticate this note.     --Leatha Cervantes MD

## 2022-09-03 LAB — VITAMIN B-12: 450 PG/ML (ref 211–911)

## 2022-09-07 ENCOUNTER — TELEPHONE (OUTPATIENT)
Dept: FAMILY MEDICINE CLINIC | Age: 77
End: 2022-09-07

## 2022-09-07 RX ORDER — LANOLIN ALCOHOL/MO/W.PET/CERES
1000 CREAM (GRAM) TOPICAL DAILY
COMMUNITY

## 2022-09-07 NOTE — TELEPHONE ENCOUNTER
Pt called in and I gave results. Pt did verbalized she did start taking vitamin b12 1000 mcg.  I Added it into medication list.

## 2022-09-07 NOTE — RESULT ENCOUNTER NOTE
Please let patient know that her vitamin b12 is doing much better -- if she is doing vitamin B12 1000 mcg orally daily as recommended in June, let's add it to her me list.  Thank you.

## 2022-09-07 NOTE — TELEPHONE ENCOUNTER
----- Message from Erin Pires MD sent at 9/6/2022 10:16 PM EDT -----  Please let patient know that her vitamin b12 is doing much better -- if she is doing vitamin B12 1000 mcg orally daily as recommended in June, let's add it to her me list.  Thank you.

## 2022-09-08 PROBLEM — R91.8 LUNG NODULES: Status: ACTIVE | Noted: 2022-09-08

## 2022-09-08 ASSESSMENT — ENCOUNTER SYMPTOMS: SHORTNESS OF BREATH: 0

## 2022-09-26 ENCOUNTER — TELEPHONE (OUTPATIENT)
Dept: FAMILY MEDICINE CLINIC | Age: 77
End: 2022-09-26

## 2022-09-26 DIAGNOSIS — M17.0 PRIMARY OSTEOARTHRITIS OF BOTH KNEES: Primary | ICD-10-CM

## 2022-09-26 DIAGNOSIS — Z12.11 SCREEN FOR COLON CANCER: ICD-10-CM

## 2022-09-26 LAB
CONTROL: PRESENT
HEMOCCULT STL QL: NEGATIVE

## 2022-09-26 PROCEDURE — 82274 ASSAY TEST FOR BLOOD FECAL: CPT | Performed by: FAMILY MEDICINE

## 2022-09-26 NOTE — TELEPHONE ENCOUNTER
Patient is wanting to know if you are able to print her out a script and sign it for her to  for a Handicap placard?

## 2022-12-09 ENCOUNTER — OFFICE VISIT (OUTPATIENT)
Dept: FAMILY MEDICINE CLINIC | Age: 77
End: 2022-12-09

## 2022-12-09 VITALS
SYSTOLIC BLOOD PRESSURE: 144 MMHG | DIASTOLIC BLOOD PRESSURE: 94 MMHG | HEIGHT: 65 IN | RESPIRATION RATE: 18 BRPM | BODY MASS INDEX: 42.65 KG/M2 | HEART RATE: 82 BPM | OXYGEN SATURATION: 95 % | TEMPERATURE: 97.3 F | WEIGHT: 256 LBS

## 2022-12-09 DIAGNOSIS — R35.0 URINARY FREQUENCY: ICD-10-CM

## 2022-12-09 DIAGNOSIS — N30.90 CYSTITIS: Primary | ICD-10-CM

## 2022-12-09 LAB
BILIRUBIN, POC: NORMAL
BLOOD URINE, POC: NORMAL
CLARITY, POC: NORMAL
COLOR, POC: YELLOW
GLUCOSE URINE, POC: NORMAL
KETONES, POC: NORMAL
LEUKOCYTE EST, POC: NORMAL
NITRITE, POC: NORMAL
PH, POC: 6
PROTEIN, POC: NORMAL
SPECIFIC GRAVITY, POC: 1.02
UROBILINOGEN, POC: 0.2

## 2022-12-09 RX ORDER — NITROFURANTOIN 25; 75 MG/1; MG/1
100 CAPSULE ORAL 2 TIMES DAILY
Qty: 14 CAPSULE | Refills: 0 | Status: SHIPPED | OUTPATIENT
Start: 2022-12-09 | End: 2022-12-16

## 2022-12-09 ASSESSMENT — ENCOUNTER SYMPTOMS
GASTROINTESTINAL NEGATIVE: 1
RESPIRATORY NEGATIVE: 1

## 2022-12-09 NOTE — PROGRESS NOTES
Ana Rosa Lopez (:  1945) is a 78 y.o. female,Established patient, here for evaluation of the following chief complaint(s):  Vaginal Pain (1.5 week hx. Pt states she took husbands left over antibiotic and has gotten better. She could not tell me name of antibiotic. Urinary frequency. Pt denies burning or blood.)         ASSESSMENT/PLAN:  1. Cystitis  -     nitrofurantoin, macrocrystal-monohydrate, (MACROBID) 100 MG capsule; Take 1 capsule by mouth 2 times daily for 7 days, Disp-14 capsule, R-0Normal  -     Culture, Urine  2. Urinary frequency  -     POCT Urinalysis No Micro (Auto)      Return if symptoms worsen or fail to improve. Drink plenty of fluids. Tylenol for pain. Culture urine. Discussed s/s requiring evaluation and treatment. Patient given educational materials - see patient instructions. Discussed use, benefit, and side effects of prescribed medications. All patient questions answered. Pt voiced understanding. Reviewed health maintenance. Patient agreed with treatment plan. Follow up as directed. Subjective   SUBJECTIVE/OBJECTIVE:  Urinary Tract Infection  This is a new problem. The current episode started in the past 7 days. The problem has been waxing and waning since onset. Pertinent negatives include no hematuria. Risk factors include kidney stones. She states she started taking some of her husbands unused abx, a total of 3 doses and this helped with the pain. Last dose was 2 days ago. Her pain and symptoms returned once abx was stopped. She does have a h/o kidney stones many years ago. She is voiding and she feels like she is emptying her bladder completely. Review of Systems   Constitutional:  Negative for chills and fever. HENT: Negative. Respiratory: Negative. Cardiovascular: Negative. Gastrointestinal: Negative. Genitourinary:  Positive for flank pain (left side) and frequency.  Negative for difficulty urinating, dysuria, hematuria and urgency. Objective   Physical Exam  Constitutional:       General: She is not in acute distress. Appearance: Normal appearance. She is not ill-appearing or toxic-appearing. HENT:      Head: Normocephalic and atraumatic. Cardiovascular:      Rate and Rhythm: Normal rate and regular rhythm. Pulses: Normal pulses. Heart sounds: Normal heart sounds. Pulmonary:      Effort: Pulmonary effort is normal.      Breath sounds: Normal breath sounds. Abdominal:      General: Abdomen is flat. Bowel sounds are normal.      Palpations: Abdomen is soft. Tenderness: There is abdominal tenderness (llq). Skin:     General: Skin is warm and dry. Capillary Refill: Capillary refill takes less than 2 seconds. Neurological:      Mental Status: She is alert. An electronic signature was used to authenticate this note.     --MARCELLO Duarte - CNP

## 2022-12-11 LAB
ORGANISM: ABNORMAL
URINE CULTURE, ROUTINE: ABNORMAL
URINE CULTURE, ROUTINE: ABNORMAL

## 2022-12-13 DIAGNOSIS — N30.90 CYSTITIS: Primary | ICD-10-CM

## 2022-12-13 RX ORDER — CEFDINIR 300 MG/1
300 CAPSULE ORAL 2 TIMES DAILY
Qty: 14 CAPSULE | Refills: 0 | Status: SHIPPED | OUTPATIENT
Start: 2022-12-13 | End: 2022-12-20

## 2022-12-13 NOTE — PROGRESS NOTES
Please call the patient and let her know that  urine is growing a bacteria not covered by [facility]. I did send in [address] to her pharmacy. I will have her take this for 7 days.

## 2022-12-20 ENCOUNTER — HOSPITAL ENCOUNTER (OUTPATIENT)
Dept: CT IMAGING | Age: 77
Discharge: HOME | End: 2022-12-20
Payer: MEDICARE

## 2022-12-20 ENCOUNTER — OFFICE VISIT (OUTPATIENT)
Dept: FAMILY MEDICINE CLINIC | Age: 77
End: 2022-12-20
Payer: MEDICARE

## 2022-12-20 ENCOUNTER — TELEPHONE (OUTPATIENT)
Dept: FAMILY MEDICINE CLINIC | Age: 77
End: 2022-12-20

## 2022-12-20 ENCOUNTER — HOSPITAL ENCOUNTER (OUTPATIENT)
Age: 77
End: 2022-12-20

## 2022-12-20 VITALS
HEART RATE: 69 BPM | HEIGHT: 64 IN | TEMPERATURE: 97.3 F | SYSTOLIC BLOOD PRESSURE: 114 MMHG | WEIGHT: 254.6 LBS | DIASTOLIC BLOOD PRESSURE: 78 MMHG | OXYGEN SATURATION: 99 % | BODY MASS INDEX: 43.46 KG/M2 | RESPIRATION RATE: 16 BRPM

## 2022-12-20 DIAGNOSIS — R31.9 HEMATURIA, UNSPECIFIED TYPE: ICD-10-CM

## 2022-12-20 DIAGNOSIS — R10.9 LEFT FLANK PAIN: Primary | ICD-10-CM

## 2022-12-20 DIAGNOSIS — R10.9 LEFT FLANK PAIN: ICD-10-CM

## 2022-12-20 PROCEDURE — 74176 CT ABD & PELVIS W/O CONTRAST: CPT

## 2022-12-20 PROCEDURE — 99213 OFFICE O/P EST LOW 20 MIN: CPT | Performed by: NURSE PRACTITIONER

## 2022-12-20 PROCEDURE — 1124F ACP DISCUSS-NO DSCNMKR DOCD: CPT | Performed by: NURSE PRACTITIONER

## 2022-12-20 ASSESSMENT — ENCOUNTER SYMPTOMS
GASTROINTESTINAL NEGATIVE: 1
RESPIRATORY NEGATIVE: 1

## 2022-12-20 NOTE — PROGRESS NOTES
Tyron Mora (:  1945) is a 78 y.o. female,Established patient, here for evaluation of the following chief complaint(s):  Urinary Tract Infection (Still having some pain.)         ASSESSMENT/PLAN:  1. Left flank pain  -     CT ABDOMEN PELVIS WO CONTRAST Additional Contrast? None; Future  2. Hematuria, unspecified type  -     CT ABDOMEN PELVIS WO CONTRAST Additional Contrast? None; Future    Return if symptoms worsen or fail to improve. Patient has a history of kidney stones. She continues to have pain ongoing for the past week. Hematuria and flank pain present. Will obtain CT scan to r/o kidney stones. Subjective   SUBJECTIVE/OBJECTIVE:  HPI  FU after UTI still having left flank pain and lower abdominal pain  Review of Systems   Constitutional: Negative. HENT: Negative. Respiratory: Negative. Cardiovascular: Negative. Gastrointestinal: Negative. Genitourinary:  Positive for flank pain and hematuria. Negative for difficulty urinating, dysuria and frequency. Objective   Physical Exam  Constitutional:       Appearance: Normal appearance. HENT:      Head: Normocephalic and atraumatic. Cardiovascular:      Rate and Rhythm: Normal rate and regular rhythm. Pulmonary:      Effort: Pulmonary effort is normal.      Breath sounds: Normal breath sounds. Abdominal:      General: Abdomen is flat. Bowel sounds are normal.      Palpations: Abdomen is soft. Tenderness: There is left CVA tenderness. There is no right CVA tenderness. Skin:     General: Skin is warm and dry. Neurological:      Mental Status: She is alert. An electronic signature was used to authenticate this note.     --Vi Saha, APRN - CNP

## 2022-12-20 NOTE — TELEPHONE ENCOUNTER
----- Message from Harman Ricarda, APRN - CNP sent at 2022 12:37 PM EST -----  Please let the patient know that her CT shows bilateral kidney stones. There is no hydronephrosis. These stones should pass on there own but we will need to monitor to make sure. For pain I recommend Tylenol or Motrin. Make sure to drink lots of water. If the symptoms do not improve in a week or so, we would need to send her to urology.

## 2023-01-24 ENCOUNTER — HOSPITAL ENCOUNTER (OUTPATIENT)
Age: 78
Discharge: HOME | End: 2023-01-24
Payer: MEDICARE

## 2023-01-24 PROCEDURE — 36415 COLL VENOUS BLD VENIPUNCTURE: CPT

## 2023-01-24 PROCEDURE — 86304 IMMUNOASSAY TUMOR CA 125: CPT

## 2023-01-26 LAB — CANCER AG125 SERPL-ACNC: 14 U/ML

## 2023-01-30 ENCOUNTER — TELEPHONE (OUTPATIENT)
Dept: UROLOGY | Age: 78
End: 2023-01-30

## 2023-01-30 DIAGNOSIS — N20.1 CALCULUS OF DISTAL LEFT URETER: ICD-10-CM

## 2023-01-30 DIAGNOSIS — R10.9 LEFT FLANK PAIN: Primary | ICD-10-CM

## 2023-01-30 RX ORDER — TAMSULOSIN HYDROCHLORIDE 0.4 MG/1
0.4 CAPSULE ORAL DAILY
Qty: 30 CAPSULE | Refills: 0 | Status: SHIPPED | OUTPATIENT
Start: 2023-01-30

## 2023-01-30 RX ORDER — KETOROLAC TROMETHAMINE 10 MG/1
10 TABLET, FILM COATED ORAL EVERY 6 HOURS PRN
Qty: 30 TABLET | Refills: 0 | Status: SHIPPED | OUTPATIENT
Start: 2023-01-30

## 2023-01-30 NOTE — TELEPHONE ENCOUNTER
Patient is calling to try to get in sooner. She is scheduled with Dr. Daphene Phoenix on 2-13-23 as new patient. She had a ct 12-20-23. She said her pain is at a 7  in lower left back. She also said they did not put her on any medications. No flomax or anything else. Please advise. Thank you.

## 2023-01-30 NOTE — TELEPHONE ENCOUNTER
Scheduled on 2-3-23 to rev renal us and kub. Renal us and kub being done 1-31. He also called in pres for her. Patient voiced understanding.

## 2023-01-30 NOTE — TELEPHONE ENCOUNTER
Patient has left-sided 4 mm calculus at the UVJ. Will need Flomax and Toradol. Sent to [place]. Take Flomax once daily. Take Toradol every 6 hours for pain as needed. Most recent imaging from 12/20/22, will repeat with KUB and Renal US to evaluate further. Can move up her follow-up with me.

## 2023-01-31 ENCOUNTER — HOSPITAL ENCOUNTER (OUTPATIENT)
Dept: ULTRASOUND IMAGING | Age: 78
Discharge: HOME | End: 2023-01-31
Payer: MEDICARE

## 2023-01-31 DIAGNOSIS — N20.1 CALCULUS OF DISTAL LEFT URETER: ICD-10-CM

## 2023-01-31 DIAGNOSIS — R10.9 LEFT FLANK PAIN: ICD-10-CM

## 2023-01-31 PROCEDURE — 76770 US EXAM ABDO BACK WALL COMP: CPT

## 2023-02-03 ENCOUNTER — OFFICE VISIT (OUTPATIENT)
Dept: UROLOGY | Age: 78
End: 2023-02-03
Payer: MEDICARE

## 2023-02-03 VITALS
BODY MASS INDEX: 44.16 KG/M2 | SYSTOLIC BLOOD PRESSURE: 132 MMHG | HEIGHT: 64 IN | WEIGHT: 258.7 LBS | DIASTOLIC BLOOD PRESSURE: 80 MMHG

## 2023-02-03 DIAGNOSIS — N28.1 CYST OF RIGHT KIDNEY: ICD-10-CM

## 2023-02-03 DIAGNOSIS — N20.1 CALCULUS OF DISTAL LEFT URETER: Primary | ICD-10-CM

## 2023-02-03 LAB
BILIRUBIN URINE: NEGATIVE
BLOOD URINE, POC: ABNORMAL
CHARACTER, URINE: ABNORMAL
COLOR, URINE: ABNORMAL
GLUCOSE URINE: NEGATIVE MG/DL
KETONES, URINE: NEGATIVE
LEUKOCYTE CLUMPS, URINE: ABNORMAL
NITRITE, URINE: NEGATIVE
PH, URINE: 6.5 (ref 5–9)
PROTEIN, URINE: NEGATIVE MG/DL
SPECIFIC GRAVITY, URINE: 1.02 (ref 1–1.03)
UROBILINOGEN, URINE: 0.2 EU/DL (ref 0–1)

## 2023-02-03 PROCEDURE — G8427 DOCREV CUR MEDS BY ELIG CLIN: HCPCS

## 2023-02-03 PROCEDURE — G8400 PT W/DXA NO RESULTS DOC: HCPCS

## 2023-02-03 PROCEDURE — 1124F ACP DISCUSS-NO DSCNMKR DOCD: CPT

## 2023-02-03 PROCEDURE — G8417 CALC BMI ABV UP PARAM F/U: HCPCS

## 2023-02-03 PROCEDURE — 3017F COLORECTAL CA SCREEN DOC REV: CPT

## 2023-02-03 PROCEDURE — 1036F TOBACCO NON-USER: CPT

## 2023-02-03 PROCEDURE — 1090F PRES/ABSN URINE INCON ASSESS: CPT

## 2023-02-03 PROCEDURE — 81003 URINALYSIS AUTO W/O SCOPE: CPT

## 2023-02-03 PROCEDURE — 99214 OFFICE O/P EST MOD 30 MIN: CPT

## 2023-02-03 PROCEDURE — G8484 FLU IMMUNIZE NO ADMIN: HCPCS

## 2023-02-03 RX ORDER — OXYBUTYNIN CHLORIDE 5 MG/1
5 TABLET ORAL DAILY
COMMUNITY

## 2023-02-03 NOTE — PROGRESS NOTES
[address] [address]  Dept: [phone]  Loc: [phone]  Visit Date: 2/3/2023    Patient:  Nazia Villa  YOB: 1945  Date: 2/3/2023    HISTORY OF PRESENT ILLNESS:   The patient is a 78 y.o. female who presents today as a new patient for evaluation of nephrolithiasis. Patient had CT Abdomen Pelvis performed on 12/20/22 that showed 4 mm calculus in at the left UVJ which the patient believes she has passed. Pain has resolved. Patient denies fever/chills, or dysuria. Ultrasound Renal shows resolution of hydronephrosis without ureteral stone, also of note- patient has bilateral nephrolithiasis. Two previous stone episodes dating back 10 years for which she needed surgical intervention. Has not had further workup for kidney stone prevention. No previous visits with Urology.      Pain Scale 0/10    UA:   Lab Results   Component Value Date/Time    COLORU Other 02/03/2023 07:59 AM    COLORU YELLOW 01/24/2023 12:00 PM    LABSPEC 1.020 02/03/2023 07:59 AM    LABPH 6.50 02/03/2023 07:59 AM    NITRU Negative 02/03/2023 07:59 AM    GLUCOSEU Negative 02/03/2023 07:59 AM    KETUA Negative 02/03/2023 07:59 AM    UROBILINOGEN 0.20 02/03/2023 07:59 AM    BILIRUBINUR Negative 02/03/2023 07:59 AM    BILIRUBINUR Neg 12/09/2022 11:38 AM          Last BUN and creatinine:  Lab Results   Component Value Date    BUN 9 12/08/2021     Lab Results   Component Value Date    CREATININE 0.7 12/08/2021       Imaging Reviewed during this Office Visit:   (Results were independently reviewed by physician and radiology report verified.)  I independently reviewed and verified the images and reports from:    US RENAL COMPLETE    Result Date: 2/1/2023  PROCEDURE: US RENAL COMPLETE     CLINICAL INFORMATION: Left flank pain, Calculus of distal left ureter TECHNIQUE: Grayscale and color Doppler ultrasound     COMPARISON: CT abdomen and pelvis 12/20/2022     FINDINGS: There are multiple bilateral intrarenal calculi. Right 2 largest are measured 1.2 x 1.0 cm calculus in the 0.7 x 0.8 calculus neither obstructing. There is no hydronephrosis. There is no renal cortical thinning. Renal cortical echogenicity is difficult to assess due to the presence of a 12 cm pararenal cyst Resistive index is normal. Left similar right multiple intrarenal calculi no hydronephrosis. Largest to 1.5 cm and 0.8 cm. No renal cortical thinning. Resistive index is normal. No ureteral dilatation is observed no calculi are seen near the bladder. Bladder is not significantly distended volume is 108 mL. Jets are not identified. No post void image was obtained as the patient did not void. RIGHT KIDNEY - 9.3 x 5.7 x 5.9 cm Resistive Index - 0.61 Cortical Thickness - 1.1 cm LEFT KIDNEY - 10.3 x 5.3 x 5.5 cm Resistive Index - 0.70 Cortical Thickness - 1.3 cm URINARY BLADDER Pre-Void - 108 mL     Bilateral nonobstructive nephrolithiasis. **This report has been created using voice recognition software. It may contain minor errors which are inherent in voice recognition technology. **     Final report electronically signed by Dr. Lynsey Simons on 2/1/2023 8:17 AM        PAST MEDICAL, FAMILY AND SOCIAL HISTORY:  Past Medical History:   Diagnosis Date    Nephrolithiasis     Ovarian cancer ([address] 04/18/2018    Tremors of nervous system      Past Surgical History:   Procedure Laterality Date    CHOLECYSTECTOMY, LAPAROSCOPIC  08/2001    HYSTERECTOMY, TOTAL ABDOMINAL (CERVIX REMOVED)  04/18/2018    JOINT REPLACEMENT Right 2019    knee    KNEE SURGERY  1995    arthroscopic    TONSILLECTOMY  1965     Family History   Problem Relation Age of Onset    Cancer Father     Mult Sclerosis Sister     Hearing Loss Maternal Grandfather      No outpatient medications have been marked as taking for the 2/3/23 encounter (Office Visit) with Lindsey Maxwell PA-C.        Latex  Social History     Tobacco Use   Smoking Status Never   Smokeless Tobacco Never       Social History     Substance and Sexual Activity   Alcohol Use No       REVIEW OF SYSTEMS:  Constitutional: negative  Eyes: negative  Respiratory: negative  Cardiovascular: negative  Gastrointestinal: negative  Genitourinary: negative except for what is in HPI  Musculoskeletal: negative  Skin: negative   Neurological: negative  Hematological/Lymphatic: negative  Psychological: negative    Physical Exam:    This a 78 y.o. female    There were no vitals filed for this visit. Constitutional: Patient in no acute distress. Alert and oriented to person, place and time. Psychiatric: Normal mood and affect. Pulmonary: Respiratory effort is normal, no accessory muscle use. Cardiovascular: Normal rate, regular rhythm, and normal peripheral pulses  Abdomen: Soft, non-tender, non-distended. Genitourinary: No CVA tenderness. Bladder non-tender and not distended. Assessment and Plan   Calculus of Left Distal Ureter  - Acute stone episode resolved. Patient previously noted to have a 4 mm calculus in the distal portion of the left ureter without hydronephrosis/hydroureter. Patient presented to the office today without symptoms. Pain resolved. - Discussed additional workup vs conservative management with diet. Patient prefers to investigate causes of her stone episodes. - LithoLink study ordered and to be done at minimum 2 weeks prior to next visit. Patient expresses understanding.   - Follow-up with physician in the office in 1 month to discuss possibility of ESWL and [address] [place] [address] Blvd study. 2.   Right Renal Cyst  - Sizeable, measuring 13 x 10 cm per report. Located between patient's liver and right kidney. Stable per comparisons of previous imaging. Potential complicating factor for ESWL, will discuss with physician in the office. 3.   OAB  - Patient has incontinent episodes. Started Oxybutynin 5 mg for several days prior to onset of kidney stone. Medication ordered by her gynecologist.   - Wearing pads daily. - Can resume Oxybutynin.        Daved Romberg, PA-C  2/3/23  Urology

## 2023-03-02 ENCOUNTER — OFFICE VISIT (OUTPATIENT)
Dept: FAMILY MEDICINE CLINIC | Age: 78
End: 2023-03-02

## 2023-03-02 ENCOUNTER — HOSPITAL ENCOUNTER (OUTPATIENT)
Age: 78
Discharge: HOME | End: 2023-03-02
Payer: MEDICARE

## 2023-03-02 VITALS
DIASTOLIC BLOOD PRESSURE: 78 MMHG | WEIGHT: 254 LBS | BODY MASS INDEX: 43.36 KG/M2 | HEIGHT: 64 IN | OXYGEN SATURATION: 98 % | HEART RATE: 57 BPM | TEMPERATURE: 97.4 F | SYSTOLIC BLOOD PRESSURE: 134 MMHG

## 2023-03-02 DIAGNOSIS — E83.52 HYPERCALCEMIA: ICD-10-CM

## 2023-03-02 DIAGNOSIS — C56.1 ENDOMETRIOID ADENOCARCINOMA OF RIGHT OVARY (HCC): ICD-10-CM

## 2023-03-02 DIAGNOSIS — N20.0 NEPHROLITHIASIS: ICD-10-CM

## 2023-03-02 DIAGNOSIS — E66.01 OBESITY, CLASS III, BMI 40-49.9 (MORBID OBESITY) (HCC): ICD-10-CM

## 2023-03-02 DIAGNOSIS — G25.0 BENIGN ESSENTIAL TREMOR: Primary | ICD-10-CM

## 2023-03-02 DIAGNOSIS — R91.8 LUNG NODULES: ICD-10-CM

## 2023-03-02 DIAGNOSIS — L98.9 SKIN LESION OF BACK: ICD-10-CM

## 2023-03-02 PROBLEM — S52.509A CLOSED FRACTURE OF DISTAL END OF RADIUS: Status: RESOLVED | Noted: 2020-01-16 | Resolved: 2023-03-02

## 2023-03-02 PROCEDURE — 82306 VITAMIN D 25 HYDROXY: CPT

## 2023-03-02 PROCEDURE — 80048 BASIC METABOLIC PNL TOTAL CA: CPT

## 2023-03-02 PROCEDURE — 36415 COLL VENOUS BLD VENIPUNCTURE: CPT

## 2023-03-02 RX ORDER — PRIMIDONE 50 MG/1
50 TABLET ORAL 3 TIMES DAILY
Qty: 270 TABLET | Refills: 3 | Status: SHIPPED | OUTPATIENT
Start: 2023-03-02

## 2023-03-02 RX ORDER — TRIAMCINOLONE ACETONIDE 1 MG/G
CREAM TOPICAL
Qty: 30 G | Refills: 0 | Status: SHIPPED | OUTPATIENT
Start: 2023-03-02

## 2023-03-02 SDOH — ECONOMIC STABILITY: HOUSING INSECURITY
IN THE LAST 12 MONTHS, WAS THERE A TIME WHEN YOU DID NOT HAVE A STEADY PLACE TO SLEEP OR SLEPT IN A SHELTER (INCLUDING NOW)?: NO

## 2023-03-02 SDOH — ECONOMIC STABILITY: INCOME INSECURITY: HOW HARD IS IT FOR YOU TO PAY FOR THE VERY BASICS LIKE FOOD, HOUSING, MEDICAL CARE, AND HEATING?: NOT HARD AT ALL

## 2023-03-02 SDOH — ECONOMIC STABILITY: FOOD INSECURITY: WITHIN THE PAST 12 MONTHS, YOU WORRIED THAT YOUR FOOD WOULD RUN OUT BEFORE YOU GOT MONEY TO BUY MORE.: NEVER TRUE

## 2023-03-02 SDOH — ECONOMIC STABILITY: FOOD INSECURITY: WITHIN THE PAST 12 MONTHS, THE FOOD YOU BOUGHT JUST DIDN'T LAST AND YOU DIDN'T HAVE MONEY TO GET MORE.: NEVER TRUE

## 2023-03-02 ASSESSMENT — PATIENT HEALTH QUESTIONNAIRE - PHQ9
5. POOR APPETITE OR OVEREATING: 0
SUM OF ALL RESPONSES TO PHQ QUESTIONS 1-9: 0
SUM OF ALL RESPONSES TO PHQ9 QUESTIONS 1 & 2: 0
4. FEELING TIRED OR HAVING LITTLE ENERGY: 0
SUM OF ALL RESPONSES TO PHQ QUESTIONS 1-9: 0
9. THOUGHTS THAT YOU WOULD BE BETTER OFF DEAD, OR OF HURTING YOURSELF: 0
SUM OF ALL RESPONSES TO PHQ QUESTIONS 1-9: 0
8. MOVING OR SPEAKING SO SLOWLY THAT OTHER PEOPLE COULD HAVE NOTICED. OR THE OPPOSITE, BEING SO FIGETY OR RESTLESS THAT YOU HAVE BEEN MOVING AROUND A LOT MORE THAN USUAL: 0
7. TROUBLE CONCENTRATING ON THINGS, SUCH AS READING THE NEWSPAPER OR WATCHING TELEVISION: 0
2. FEELING DOWN, DEPRESSED OR HOPELESS: 0
1. LITTLE INTEREST OR PLEASURE IN DOING THINGS: 0
10. IF YOU CHECKED OFF ANY PROBLEMS, HOW DIFFICULT HAVE THESE PROBLEMS MADE IT FOR YOU TO DO YOUR WORK, TAKE CARE OF THINGS AT HOME, OR GET ALONG WITH OTHER PEOPLE: 0
SUM OF ALL RESPONSES TO PHQ QUESTIONS 1-9: 0
6. FEELING BAD ABOUT YOURSELF - OR THAT YOU ARE A FAILURE OR HAVE LET YOURSELF OR YOUR FAMILY DOWN: 0
3. TROUBLE FALLING OR STAYING ASLEEP: 0

## 2023-03-02 NOTE — PATIENT INSTRUCTIONS
Aim for vitamin D3 2000 or less a day.      Continue good water intake -- 10 glasses a day if possible     Avoid vitamin C supplement and in general avoid juices

## 2023-03-02 NOTE — Clinical Note
Patient with h/o very small nodules noted last year on CT, h/o ovarian cancer. I meant to remind her of follow up in office but she had other concerns as well. I have ordered that now. Please let her know of this order.

## 2023-03-02 NOTE — PROGRESS NOTES
Gemma Vazquez (:  1945) is a 78 y.o. female,Established patient, here for evaluation of the following chief complaint(s):  6 Month Follow-Up (Spot on shoulder needs looked at), Kidney Problem (Pain from stones), and Medication Check (Would like to discuss her vitamins)       ASSESSMENT/PLAN:  1. Benign essential tremor  -     primidone (MYSOLINE) 50 MG tablet; Take 1 tablet by mouth 3 times daily, Disp-270 tablet, R-3Normal  -     Basic Metabolic Panel; Future  -     Calcium, Ionized; Future  -     PTH, Intact; Future  2. Skin lesion of back  -     triamcinolone (KENALOG) 0.1 % cream; Apply topically 2 times daily. No more than 2 weeks. , Disp-30 g, R-0, Normal  3. Nephrolithiasis  -     Basic Metabolic Panel; Future  -     Vitamin D 25 Hydroxy; Future  -     Basic Metabolic Panel; Future  -     Calcium, Ionized; Future  -     PTH, Intact; Future  4. 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 Obesity, Class III, BMI 40-49.9 (morbid obesity) ([facility] [address]  -     Basic Metabolic Panel; Future  -     Calcium, Ionized; Future  -     PTH, Intact; Future  7. Lung nodules  -     CT CHEST WO CONTRAST; Future    Continue good water intake -- 10 cups +  Avoid extra vitamin D and calcium. HCTZ considered but would worsen hypercalcemia   Urinary calcium studies may help -- urology following  If high calcium established on follow up labs, then PTH will be obtained    Will monitor lesion, early AK. Moisturize for now. Return for awv. Subjective   SUBJECTIVE/OBJECTIVE:  HPI    Patient with various concerns. Shoulder spot  for months noticed, back right.     Kidney stones  - 20 years ago first one, now has had a couple   -- calcium with vitamin D3 1000 units daily x 2 is being consumed. 5/2022 -- labs show hypercalcemia. No HCTZ. Concern that vitamin D is part of the problem. Lithotripsy 2/13 -- urologist.  Albino Spikes studies to be reviewed soon. Toradol taken 1/2 intermittently to help with pain. Calcium is noted high recently. No vitamin D or PTH. OA. Celebrex helping significantly. Stomach issues doing better. Tremors worsening -- off and on. She would like to use more, but would run out. H/o ovarian cancer with TAHBSO 2018. New lung nodules noted in 6/2022. Had hemoptysis but had no further episodes.  was unchanged then. F/u in 6 months was considered. Review of Systems    No fever/chills. No SOB/CP. No N/V/D    Objective   Physical Exam     Gen: NAD, AAO x 3, coherent, pleasant  CTAb. RRR  Right posterior shoulder with an erythematous mildly keratotic lesion about 8 mm in size. Also signs of scratching. Lab Results   Component Value Date    HZEHOGKA18 645 09/02/2022     On this date 3/2/2023 I have spent 40-45 minutes reviewing previous notes, test results and face to face with the patient discussing the diagnosis and importance of compliance with the treatment plan as well as documenting on the day of the visit. This office note may have been at least partially dictated. Effort was made to review for errors but some may have been missed. Please contact Mancel Amass of note for clarification if needed. An electronic signature was used to authenticate this note.     --Gera Bah MD

## 2023-03-03 LAB
25(OH)D3 SERPL-MCNC: 24 NG/ML (ref 30–100)
ANION GAP SERPL CALC-SCNC: 9 MEQ/L (ref 8–16)
BUN SERPL-MCNC: 10 MG/DL (ref 7–22)
CALCIUM SERPL-MCNC: 10.6 MG/DL (ref 8.5–10.5)
CHLORIDE SERPL-SCNC: 104 MEQ/L (ref 98–111)
CO2 SERPL-SCNC: 28 MEQ/L (ref 23–33)
CREAT SERPL-MCNC: 0.7 MG/DL (ref 0.4–1.2)
GFR SERPL CREATININE-BSD FRML MDRD: > 60 ML/MIN/1.73M2
GLUCOSE SERPL-MCNC: 102 MG/DL (ref 70–108)
POTASSIUM SERPL-SCNC: 4 MEQ/L (ref 3.5–5.2)
SODIUM SERPL-SCNC: 141 MEQ/L (ref 135–145)

## 2023-03-05 PROBLEM — R04.2 HEMOPTYSIS: Status: RESOLVED | Noted: 2022-06-05 | Resolved: 2023-03-05

## 2023-03-06 ENCOUNTER — TELEPHONE (OUTPATIENT)
Dept: FAMILY MEDICINE CLINIC | Age: 78
End: 2023-03-06

## 2023-03-06 NOTE — TELEPHONE ENCOUNTER
----- Message from Sanchez Paulson MD sent at 3/5/2023  7:03 PM EST -----  Please let patient know that calcium level was mildly elevated but less than previous. -- I recommend she stop the calcium + vitamin D supplement. -- she is mildly low in vitamin D as well, so she is to limit her vitamin D intake to 2000 units daily. Avoid vitamin C in high doses. -- a follow up is recommended in 2 months.    -- she is to follow with urology, so they may have other recommendations

## 2023-03-06 NOTE — RESULT ENCOUNTER NOTE
Please let patient know that calcium level was mildly elevated but less than previous. -- I recommend she stop the calcium + vitamin D supplement. -- she is mildly low in vitamin D as well, so she is to limit her vitamin D intake to 2000 units daily. Avoid vitamin C in high doses. -- a follow up is recommended in 2 months.    -- she is to follow with urology, so they may have other recommendations

## 2023-03-13 ENCOUNTER — OFFICE VISIT (OUTPATIENT)
Dept: UROLOGY | Age: 78
End: 2023-03-13
Payer: MEDICARE

## 2023-03-13 ENCOUNTER — HOSPITAL ENCOUNTER (OUTPATIENT)
Dept: CT IMAGING | Age: 78
Discharge: HOME | End: 2023-03-13
Payer: MEDICARE

## 2023-03-13 ENCOUNTER — TELEPHONE (OUTPATIENT)
Dept: FAMILY MEDICINE CLINIC | Age: 78
End: 2023-03-13

## 2023-03-13 ENCOUNTER — TELEPHONE (OUTPATIENT)
Dept: UROLOGY | Age: 78
End: 2023-03-13

## 2023-03-13 VITALS
BODY MASS INDEX: 43.54 KG/M2 | WEIGHT: 255 LBS | DIASTOLIC BLOOD PRESSURE: 70 MMHG | SYSTOLIC BLOOD PRESSURE: 148 MMHG | HEIGHT: 64 IN

## 2023-03-13 DIAGNOSIS — N20.0 KIDNEY STONE: ICD-10-CM

## 2023-03-13 DIAGNOSIS — R10.9 LEFT FLANK PAIN: ICD-10-CM

## 2023-03-13 DIAGNOSIS — N28.1 CYST OF RIGHT KIDNEY: ICD-10-CM

## 2023-03-13 DIAGNOSIS — N20.1 CALCULUS OF DISTAL LEFT URETER: Primary | ICD-10-CM

## 2023-03-13 DIAGNOSIS — N20.1 CALCULUS OF DISTAL LEFT URETER: ICD-10-CM

## 2023-03-13 PROCEDURE — G8400 PT W/DXA NO RESULTS DOC: HCPCS | Performed by: UROLOGY

## 2023-03-13 PROCEDURE — G1010 CDSM STANSON: HCPCS

## 2023-03-13 PROCEDURE — 3017F COLORECTAL CA SCREEN DOC REV: CPT | Performed by: UROLOGY

## 2023-03-13 PROCEDURE — G8484 FLU IMMUNIZE NO ADMIN: HCPCS | Performed by: UROLOGY

## 2023-03-13 PROCEDURE — 1090F PRES/ABSN URINE INCON ASSESS: CPT | Performed by: UROLOGY

## 2023-03-13 PROCEDURE — G8417 CALC BMI ABV UP PARAM F/U: HCPCS | Performed by: UROLOGY

## 2023-03-13 PROCEDURE — G8427 DOCREV CUR MEDS BY ELIG CLIN: HCPCS | Performed by: UROLOGY

## 2023-03-13 PROCEDURE — 1124F ACP DISCUSS-NO DSCNMKR DOCD: CPT | Performed by: UROLOGY

## 2023-03-13 PROCEDURE — 99214 OFFICE O/P EST MOD 30 MIN: CPT | Performed by: UROLOGY

## 2023-03-13 PROCEDURE — 1036F TOBACCO NON-USER: CPT | Performed by: UROLOGY

## 2023-03-13 RX ORDER — TAMSULOSIN HYDROCHLORIDE 0.4 MG/1
0.4 CAPSULE ORAL DAILY
Qty: 21 CAPSULE | Refills: 0 | Status: SHIPPED | OUTPATIENT
Start: 2023-03-13

## 2023-03-13 RX ORDER — ZINC GLUCONATE 50 MG
50 TABLET ORAL DAILY
COMMUNITY

## 2023-03-13 NOTE — TELEPHONE ENCOUNTER
It looks like urologist ordered the CT. We recommend that ordering doctor give their results/interpretation and follow up with recommendation for best plan of action. Nothing emergent is noted.

## 2023-03-13 NOTE — TELEPHONE ENCOUNTER
Patient came to the window because [facility] and didn't understand them. She wants to know if you can tell her what it is saying, and if she still has the two 9mm stones and if she does, does she need to have surgery to have them broken up. She is stating that she is still having a lot of pain in her back. Please advise, thank you.

## 2023-03-13 NOTE — TELEPHONE ENCOUNTER
Patient scheduled for STAT CT abd/pelvis  at [facility] on 3/13/23.     Order mailed with instructions or given to the patient in the office

## 2023-03-13 NOTE — PROGRESS NOTES
MD MD [facility] Urology Clinic Consultation / New Patient Visit    Patient:  Nirav Frost  YOB: 1945  Date: 3/13/2023  Consult requested from Vito Forrester MD     HISTORY OF PRESENT ILLNESS:   The patient is a 78 y.o. female who presents today for follow-up for the following problem(s): kidney stone  Overall the problem(s) : are worsening. Associated Symptoms: No dysuria, gross hematuria. Pain Severity:      Today visit:   3/13/23   The patient is a 78 y.o. female who presents today as a new patient for evaluation of nephrolithiasis. Patient had CT Abdomen Pelvis performed on 12/20/22- left UVJ 4 mm stone, which she is unsure if she has passed. She also has BL non obstructing kidney stones. Two previous stone episodes dating back 10 years for which she needed surgical intervention. Has not had further workup for kidney stone prevention. No previous visits with Urology. Summary of old records:   (Patient's old records, notes and chart reviewed and summarized above.)    Urinalysis today:  No results found for this visit on 03/13/23.     Last BUN and creatinine:  Lab Results   Component Value Date    BUN 10 03/02/2023     Lab Results   Component Value Date    CREATININE 0.7 03/02/2023       Imaging Reviewed during this Office Visit:   (results were independently reviewed by physician and radiology report verified)    PAST MEDICAL, FAMILY AND SOCIAL HISTORY:  Past Medical History:   Diagnosis Date    Nephrolithiasis     Ovarian cancer ([address] 04/18/2018    Tremors of nervous system      Past Surgical History:   Procedure Laterality Date    CHOLECYSTECTOMY, LAPAROSCOPIC  08/2001    HYSTERECTOMY, TOTAL ABDOMINAL (CERVIX REMOVED)  04/18/2018    JOINT REPLACEMENT Right 2019    knee    KNEE SURGERY  1995    arthroscopic    TONSILLECTOMY  1965     Family History   Problem Relation Age of Onset    Cancer Father     Mult Sclerosis Sister     Hearing Loss Maternal Grandfather      Outpatient Medications Marked as Taking for the 3/13/23 encounter (Office Visit) with Kassi Roger MD   Medication Sig Dispense Refill    zinc 50 MG TABS tablet Take 50 mg by mouth daily      Multiple Vitamins-Minerals (EYE HEALTH PO) Take by mouth      triamcinolone (KENALOG) 0.1 % cream Apply topically 2 times daily. No more than 2 weeks. 30 g 0    primidone (MYSOLINE) 50 MG tablet Take 1 tablet by mouth 3 times daily 270 tablet 3    oxybutynin (DITROPAN) 5 MG tablet Take 5 mg by mouth daily      Handicap Placard MISC by Does not apply route Expiration in 5 years 1 each 0    vitamin B-12 (CYANOCOBALAMIN) 1000 MCG tablet Take 1,000 mcg by mouth daily      albuterol sulfate HFA (VENTOLIN HFA) 108 (90 Base) MCG/ACT inhaler Inhale 2 puffs into the lungs 4 times daily as needed for Wheezing 18 g 1    Nutritional Supplements (VITAMIN D BOOSTER PO) Take by mouth daily      celecoxib (CELEBREX) 200 MG capsule Take 1 capsule by mouth daily 90 capsule 3       Latex  Social History     Tobacco Use   Smoking Status Never   Smokeless Tobacco Never       Social History     Substance and Sexual Activity   Alcohol Use No       REVIEW OF SYSTEMS:  Constitutional: negative  Eyes: negative  Respiratory: negative  Cardiovascular: negative  Gastrointestinal: negative  Genitourinary: negative  Musculoskeletal: negative  Skin: negative   Neurological: negative  Hematological/Lymphatic: negative  Psychological: negative    Physical Exam:    This a 78 y.o. female      Vitals:    03/13/23 1109   BP: (!) 148/70     Constitutional: Patient in no acute distress   Neuro: alert and oriented to person place and time. Psych: Mood and affect normal.  Head: atraumatic normocephalic  Eyes: EOMi  HEENT: neck supple, trachea midline  Lungs: Respiratory effort normal  Cardiovascular:  Normal peripheral pulses  Abdomen: Soft, non-tender, non-distended, No CVA  Bladder: non-tender and not distended.   FROMx4, no cyanosis clubbing edema  Skin: warm and dry      Assessment and Plan      1. Calculus of distal left ureter    2. Cyst of right kidney    3. Kidney stone           Plan:      No follow-ups on file.   Kidney stones  KUB for non obstructing kidney sotnes - will offer ESWL for non obstructing kidney sto

## 2023-03-14 ENCOUNTER — TELEPHONE (OUTPATIENT)
Dept: UROLOGY | Age: 78
End: 2023-03-14

## 2023-03-14 DIAGNOSIS — N20.0 KIDNEY STONE: Primary | ICD-10-CM

## 2023-03-14 NOTE — TELEPHONE ENCOUNTER
3/14/2023 12:32 PM  Hey, thank you she will come back in 2 to 4 weeks to discuss treatment of the nonobstructing stones.  If she wants to discuss shockwave lithotripsy, I want her to follow up with a KUB

## 2023-03-14 NOTE — TELEPHONE ENCOUNTER
Called patient to notify her of what Dr Trev Hoffman stated. Patient would like to have a follow up appointment on 3/27/23 and she is going to get a KUB done prior.

## 2023-03-14 NOTE — TELEPHONE ENCOUNTER
Patient is calling in stating that she had her CT done yesterday as STAT and she is wanting to know if she needs to have surgery or be seen to discuss the next step. Sent perfect serve message to Dr Kendra Hart. Hi Dr Kendra Hart it's Brian Tracey, on Alvin Zhang [birth year] MR# [id number], she has a STAT CT yesterday after she saw you in the office. She is calling in wanting to know what is going to be done. your note states:  Assessment and Plan  1. Calculus of distal left ureter  2. Cyst of right kidney  3. Kidney stone  Plan:  No follow-ups on file. Kidney stones  KUB for non obstructing kidney sotnes - will offer ESWL for non obstructing kidney sto  Impression     1. There are bilateral renal stones. There is no hydronephrosis on either side. 2. There is a 12.9 x 10 cm right renal cyst, unchanged. 3. The previously noted stone adjacent to the left uterovesical junction is no longer seen. 4. There is an atrophic pancreas. 5. There is a small hiatal hernia. 6. There is atherosclerotic calcification in the right common iliac and internal iliac arteries. 7. There is lumbar spondylosis. 8. The patient is status post cholecystectomy and hysterectomy. Let me know if you need me to call you or if you know what you are going to do with her.  Thanks

## 2023-03-22 ENCOUNTER — HOSPITAL ENCOUNTER (OUTPATIENT)
Age: 78
Discharge: HOME | End: 2023-03-22
Payer: MEDICARE

## 2023-03-22 ENCOUNTER — HOSPITAL ENCOUNTER (OUTPATIENT)
Dept: GENERAL RADIOLOGY | Age: 78
Discharge: HOME | End: 2023-03-22
Payer: MEDICARE

## 2023-03-22 ENCOUNTER — HOSPITAL ENCOUNTER (OUTPATIENT)
Dept: CT IMAGING | Age: 78
Discharge: HOME | End: 2023-03-22
Payer: MEDICARE

## 2023-03-22 DIAGNOSIS — N20.0 KIDNEY STONE: ICD-10-CM

## 2023-03-22 DIAGNOSIS — R91.8 LUNG NODULES: ICD-10-CM

## 2023-03-22 PROCEDURE — 74018 RADEX ABDOMEN 1 VIEW: CPT

## 2023-03-22 PROCEDURE — 71250 CT THORAX DX C-: CPT

## 2023-03-27 ENCOUNTER — OFFICE VISIT (OUTPATIENT)
Dept: UROLOGY | Age: 78
End: 2023-03-27
Payer: MEDICARE

## 2023-03-27 ENCOUNTER — TELEPHONE (OUTPATIENT)
Dept: UROLOGY | Age: 78
End: 2023-03-27

## 2023-03-27 VITALS — HEIGHT: 64 IN | RESPIRATION RATE: 20 BRPM | BODY MASS INDEX: 43.71 KG/M2 | WEIGHT: 256 LBS

## 2023-03-27 DIAGNOSIS — N20.0 KIDNEY STONE: Primary | ICD-10-CM

## 2023-03-27 DIAGNOSIS — R10.9 LEFT FLANK PAIN: ICD-10-CM

## 2023-03-27 DIAGNOSIS — Z01.818 PRE-OP TESTING: ICD-10-CM

## 2023-03-27 PROCEDURE — G8417 CALC BMI ABV UP PARAM F/U: HCPCS | Performed by: UROLOGY

## 2023-03-27 PROCEDURE — G8427 DOCREV CUR MEDS BY ELIG CLIN: HCPCS | Performed by: UROLOGY

## 2023-03-27 PROCEDURE — 1090F PRES/ABSN URINE INCON ASSESS: CPT | Performed by: UROLOGY

## 2023-03-27 PROCEDURE — G8400 PT W/DXA NO RESULTS DOC: HCPCS | Performed by: UROLOGY

## 2023-03-27 PROCEDURE — 1124F ACP DISCUSS-NO DSCNMKR DOCD: CPT | Performed by: UROLOGY

## 2023-03-27 PROCEDURE — 1036F TOBACCO NON-USER: CPT | Performed by: UROLOGY

## 2023-03-27 PROCEDURE — 99214 OFFICE O/P EST MOD 30 MIN: CPT | Performed by: UROLOGY

## 2023-03-27 PROCEDURE — 3017F COLORECTAL CA SCREEN DOC REV: CPT | Performed by: UROLOGY

## 2023-03-27 PROCEDURE — G8484 FLU IMMUNIZE NO ADMIN: HCPCS | Performed by: UROLOGY

## 2023-03-27 NOTE — PROGRESS NOTES
Respiratory effort normal  Cardiovascular:  Normal peripheral pulses  Abdomen: Soft, non-tender, non-distended, No CVA  Bladder: non-tender and not distended. FROMx4, no cyanosis clubbing edema  Skin: warm and dry      Assessment and Plan      1. Kidney stone             Plan:      No follow-ups on file. Kidney stones  KUB for non obstructing kidney sotnes - will offer ESWL for non obstructing kidney stones  - Left side ESWL with cysto stent placement first, flomax for stone passage  - once cleared we will consider right ESWL        Risks benefits and alternative procedures are explained, informed consent is obtained, and the patient elects to proceed. The patient understands Stone free rates, post op outcomes of stone fracture and passage, and while low there is a risk of perinephric hematoma. We have discussed plans if needed.

## 2023-03-27 NOTE — TELEPHONE ENCOUNTER
DO NOT TAKE  FISH OIL, MOBIC, IBUPROFEN, MOTRIN-LIKE DRUGS AND ANY MULTIVITAMINS OR OVER THE COUNTER SUPPLEMENTS 14 DAYS PRIOR TO SURGERY. MUST HAVE AN ADULT OVER THE AGE OF 18 WITH YOU AT THE TIME OF THE DISCHARGE AND WITH YOU AT HOME AFTER THE PROCEDURE FOR 24 HOURS          Ezio Edmondson 1945 Diagnosis:     Surgical Physician: Dr. Mattie Turner have been scheduled for the procedure marked below:      Surgery: Left (ESWL) Extracorporeal shock wave lithotripsy, cystoscopy with stent         Date: 4/25/23     Anesthesia: Anesthesiologist (General/Spinal)     Place of Service: [address] [place] [address] Second Floor Same Day Surgery         Arrive to same day surgery by:  8:30 AM  (Surgery time is subject to change)      INSTRUCTIONS AS MARKED BELOW:    1.  DO NOT eat or drink anything after midnight before surgery. 2.  We prefer you shower or bathe with an antibacterial soap (Dial) the morning of surgery. 3  Please bring a current medication list, photo ID and insurance card(s) with you  4. Okay to take Tylenol  5. If you take Glucophage, Metformin or Janumet, hold 48-hours prior to surgery  6  Take blood pressure or heart medication as directed, if taken in the morning take with a small sip of water  7. The office will call you in 1-2 days after your procedure to schedule a follow up. DATE SENSITIVE TESTING-DO ON THE DATE LISTED *WALK IN *NO APPOINTMENT    DO URINE CULTURE AND CBC ON 4/11/23. ORDERS INCLUDED.         Date: 3/27/2023

## 2023-03-27 NOTE — TELEPHONE ENCOUNTER
SURGERY [address]  [address] [place], [place] [address] [facility] [address] [facility], [address]      Phone *[phone] *[phone]   Surgical Scheduling Direct Line Phone *[phone] Fax *[phone]      Alex Palm 1945 female    [address]  [facility]   Marital Status:          Home Phone: [phone]      Cell Phone:    Telephone Information:   Mobile [phone]   Mobile [phone]          Surgeon: Dr. John Garrett Surgery Date: 4/25/23   Time: 10:30 AM    Procedure: Left (ESWL) Extracorporeal shock wave lithotripsy, cystoscopy with stent     Diagnosis: Kidney stone     Important Medical History:  In [facility]    Special Inst/Equip: Regular    Next Med Germania Long confirm# [id number]    CPT Codes:    97261, [id number]  Latex Allergy: No     Cardiac Device:  No    Anesthesia:  General          Admission Type:  Same Day                        Admit Prior to Day of Surgery: No    Case Location:  Main OR            Preadmission Testing:  Phone Call          PAT Date and Time:______________________________________________________    PAT Confirmation #: ______________________________________________________    Post Op Visit: ___________________________________________________________    Need Preop Cardiac Clearance: No    Does Patient have Cardiologist/physician?      None    Surgery Confirmation #: __________________________________________________    : ________________________   Date: __________________________     [employer] Name: Medicare

## 2023-03-27 NOTE — TELEPHONE ENCOUNTER
Patient is scheduled for surgery with Dr Garfield Garcia on 4/25/23. Surgery consent on arrival. Patient to do pre op testing on 4/11/23. Surgery instructions gone over verbally. Patient will have an adult over the age of 25 with them at discharge and 24 hours after procedure. Next Marco A Allen Notified confirm# [id number].

## 2023-03-31 ENCOUNTER — PREP FOR PROCEDURE (OUTPATIENT)
Dept: UROLOGY | Age: 78
End: 2023-03-31

## 2023-04-11 ENCOUNTER — HOSPITAL ENCOUNTER (OUTPATIENT)
Age: 78
Discharge: HOME | End: 2023-04-11
Payer: MEDICARE

## 2023-04-11 DIAGNOSIS — E83.52 HYPERCALCEMIA: ICD-10-CM

## 2023-04-11 DIAGNOSIS — R10.9 LEFT FLANK PAIN: ICD-10-CM

## 2023-04-11 DIAGNOSIS — N20.0 NEPHROLITHIASIS: ICD-10-CM

## 2023-04-11 DIAGNOSIS — N20.0 KIDNEY STONE: ICD-10-CM

## 2023-04-11 DIAGNOSIS — C56.1 ENDOMETRIOID ADENOCARCINOMA OF RIGHT OVARY (HCC): ICD-10-CM

## 2023-04-11 DIAGNOSIS — Z01.818 PRE-OP TESTING: ICD-10-CM

## 2023-04-11 DIAGNOSIS — E66.01 OBESITY, CLASS III, BMI 40-49.9 (MORBID OBESITY) (HCC): ICD-10-CM

## 2023-04-11 DIAGNOSIS — G25.0 BENIGN ESSENTIAL TREMOR: ICD-10-CM

## 2023-04-11 LAB
ANION GAP SERPL CALC-SCNC: 8 MEQ/L (ref 8–16)
BASOPHILS ABSOLUTE: 0 THOU/MM3 (ref 0–0.1)
BASOPHILS NFR BLD AUTO: 0.6 %
BUN SERPL-MCNC: 12 MG/DL (ref 7–22)
CALCIUM SERPL-MCNC: 10.3 MG/DL (ref 8.5–10.5)
CHLORIDE SERPL-SCNC: 109 MEQ/L (ref 98–111)
CO2 SERPL-SCNC: 26 MEQ/L (ref 23–33)
CREAT SERPL-MCNC: 0.6 MG/DL (ref 0.4–1.2)
DEPRECATED RDW RBC AUTO: 50.4 FL (ref 35–45)
EOSINOPHIL NFR BLD AUTO: 1.4 %
EOSINOPHILS ABSOLUTE: 0.1 THOU/MM3 (ref 0–0.4)
ERYTHROCYTE [DISTWIDTH] IN BLOOD BY AUTOMATED COUNT: 13.3 % (ref 11.5–14.5)
GFR SERPL CREATININE-BSD FRML MDRD: > 60 ML/MIN/1.73M2
GLUCOSE SERPL-MCNC: 95 MG/DL (ref 70–108)
HCT VFR BLD AUTO: 40.6 % (ref 37–47)
HGB BLD-MCNC: 12.7 GM/DL (ref 12–16)
IMM GRANULOCYTES # BLD AUTO: 0.02 THOU/MM3 (ref 0–0.07)
IMM GRANULOCYTES NFR BLD AUTO: 0.3 %
IONIZED CALCIUM, WHOLE BLOOD: 1.39 MMOL/L (ref 1.12–1.32)
LYMPHOCYTES ABSOLUTE: 1.9 THOU/MM3 (ref 1–4.8)
LYMPHOCYTES NFR BLD AUTO: 31.2 %
MCH RBC QN AUTO: 31.4 PG (ref 26–33)
MCHC RBC AUTO-ENTMCNC: 31.3 GM/DL (ref 32.2–35.5)
MCV RBC AUTO: 100.2 FL (ref 81–99)
MONOCYTES ABSOLUTE: 0.4 THOU/MM3 (ref 0.4–1.3)
MONOCYTES NFR BLD AUTO: 6.1 %
NEUTROPHILS NFR BLD AUTO: 60.4 %
NRBC BLD AUTO-RTO: 0 /100 WBC
PLATELET # BLD AUTO: 187 THOU/MM3 (ref 130–400)
PMV BLD AUTO: 10.8 FL (ref 9.4–12.4)
POTASSIUM SERPL-SCNC: 4.4 MEQ/L (ref 3.5–5.2)
PTH-INTACT SERPL-MCNC: 175.4 PG/ML (ref 15–65)
RBC # BLD AUTO: 4.05 MILL/MM3 (ref 4.2–5.4)
SEGMENTED NEUTROPHILS ABSOLUTE COUNT: 3.7 THOU/MM3 (ref 1.8–7.7)
SODIUM SERPL-SCNC: 143 MEQ/L (ref 135–145)
WBC # BLD AUTO: 6.2 THOU/MM3 (ref 4.8–10.8)

## 2023-04-11 PROCEDURE — 83970 ASSAY OF PARATHORMONE: CPT

## 2023-04-11 PROCEDURE — 36415 COLL VENOUS BLD VENIPUNCTURE: CPT

## 2023-04-11 PROCEDURE — 87086 URINE CULTURE/COLONY COUNT: CPT

## 2023-04-11 PROCEDURE — 82330 ASSAY OF CALCIUM: CPT

## 2023-04-11 PROCEDURE — 85025 COMPLETE CBC W/AUTO DIFF WBC: CPT

## 2023-04-11 PROCEDURE — 80048 BASIC METABOLIC PNL TOTAL CA: CPT

## 2023-04-11 RX ORDER — IPRATROPIUM BROMIDE AND ALBUTEROL SULFATE 2.5; .5 MG/3ML; MG/3ML
1 SOLUTION RESPIRATORY (INHALATION) ONCE
Status: CANCELLED | OUTPATIENT
Start: 2023-04-11 | End: 2023-04-11

## 2023-04-11 RX ORDER — SODIUM CHLORIDE 0.9 % (FLUSH) 0.9 %
5-40 SYRINGE (ML) INJECTION EVERY 12 HOURS SCHEDULED
Status: CANCELLED | OUTPATIENT
Start: 2023-04-11

## 2023-04-11 RX ORDER — SODIUM CHLORIDE 9 MG/ML
INJECTION, SOLUTION INTRAVENOUS PRN
Status: CANCELLED | OUTPATIENT
Start: 2023-04-11

## 2023-04-11 RX ORDER — SODIUM CHLORIDE 0.9 % (FLUSH) 0.9 %
5-40 SYRINGE (ML) INJECTION PRN
Status: CANCELLED | OUTPATIENT
Start: 2023-04-11

## 2023-04-12 LAB
BACTERIA UR CULT: ABNORMAL
ORGANISM: ABNORMAL

## 2023-04-12 NOTE — RESULT ENCOUNTER NOTE
Please let patient know that her labs show Hyperparathyroidism is of concern. I recommend that she see an endocrinologist.  Locally we have Dr. Elizabeth Sic. If she has a different preference, we can do that instead.

## 2023-04-17 ENCOUNTER — TELEPHONE (OUTPATIENT)
Dept: UROLOGY | Age: 78
End: 2023-04-17

## 2023-04-17 NOTE — TELEPHONE ENCOUNTER
Please review urine culture on 4/11/23. Surgery with Dr Patricio Drake on 4/25/23 for a Left (ESWL) Extracorporeal shock wave lithotripsy, cystoscopy with stent  Thanks.

## 2023-04-25 ENCOUNTER — ANESTHESIA (OUTPATIENT)
Dept: OPERATING ROOM | Age: 78
End: 2023-04-25
Payer: MEDICARE

## 2023-04-25 ENCOUNTER — ANESTHESIA EVENT (OUTPATIENT)
Dept: OPERATING ROOM | Age: 78
End: 2023-04-25
Payer: MEDICARE

## 2023-04-25 ENCOUNTER — HOSPITAL ENCOUNTER (OUTPATIENT)
Age: 78
Setting detail: OUTPATIENT SURGERY
Discharge: HOME | End: 2023-04-25
Attending: UROLOGY | Admitting: UROLOGY
Payer: MEDICARE

## 2023-04-25 VITALS
BODY MASS INDEX: 42 KG/M2 | DIASTOLIC BLOOD PRESSURE: 88 MMHG | SYSTOLIC BLOOD PRESSURE: 151 MMHG | RESPIRATION RATE: 18 BRPM | OXYGEN SATURATION: 100 % | HEIGHT: 64 IN | WEIGHT: 246 LBS | HEART RATE: 87 BPM | TEMPERATURE: 96.8 F

## 2023-04-25 DIAGNOSIS — N20.0 KIDNEY STONE: Primary | ICD-10-CM

## 2023-04-25 DIAGNOSIS — N20.0 KIDNEY STONE ON LEFT SIDE: Primary | ICD-10-CM

## 2023-04-25 PROCEDURE — C2617 STENT, NON-COR, TEM W/O DEL: HCPCS | Performed by: UROLOGY

## 2023-04-25 PROCEDURE — 6360000002 HC RX W HCPCS: Performed by: NURSE ANESTHETIST, CERTIFIED REGISTERED

## 2023-04-25 PROCEDURE — 3600000012 HC SURGERY LEVEL 2 ADDTL 15MIN: Performed by: UROLOGY

## 2023-04-25 PROCEDURE — 3700000000 HC ANESTHESIA ATTENDED CARE: Performed by: UROLOGY

## 2023-04-25 PROCEDURE — 7100000000 HC PACU RECOVERY - FIRST 15 MIN: Performed by: UROLOGY

## 2023-04-25 PROCEDURE — 6360000002 HC RX W HCPCS: Performed by: UROLOGY

## 2023-04-25 PROCEDURE — 6360000002 HC RX W HCPCS

## 2023-04-25 PROCEDURE — 7100000010 HC PHASE II RECOVERY - FIRST 15 MIN: Performed by: UROLOGY

## 2023-04-25 PROCEDURE — 6370000000 HC RX 637 (ALT 250 FOR IP): Performed by: UROLOGY

## 2023-04-25 PROCEDURE — 6360000002 HC RX W HCPCS: Performed by: ANESTHESIOLOGY

## 2023-04-25 PROCEDURE — 7100000001 HC PACU RECOVERY - ADDTL 15 MIN: Performed by: UROLOGY

## 2023-04-25 PROCEDURE — 7100000011 HC PHASE II RECOVERY - ADDTL 15 MIN: Performed by: UROLOGY

## 2023-04-25 PROCEDURE — C1769 GUIDE WIRE: HCPCS | Performed by: UROLOGY

## 2023-04-25 PROCEDURE — 3600000002 HC SURGERY LEVEL 2 BASE: Performed by: UROLOGY

## 2023-04-25 PROCEDURE — 2580000003 HC RX 258: Performed by: UROLOGY

## 2023-04-25 PROCEDURE — 3700000001 HC ADD 15 MINUTES (ANESTHESIA): Performed by: UROLOGY

## 2023-04-25 DEVICE — URETERAL STENT
Type: IMPLANTABLE DEVICE | Site: URETER | Status: FUNCTIONAL
Brand: PERCUFLEX™ PLUS

## 2023-04-25 RX ORDER — SODIUM CHLORIDE 0.9 % (FLUSH) 0.9 %
5-40 SYRINGE (ML) INJECTION PRN
Status: DISCONTINUED | OUTPATIENT
Start: 2023-04-25 | End: 2023-04-25 | Stop reason: HOSPADM

## 2023-04-25 RX ORDER — TAMSULOSIN HYDROCHLORIDE 0.4 MG/1
0.4 CAPSULE ORAL DAILY
Qty: 14 CAPSULE | Refills: 0 | Status: SHIPPED | OUTPATIENT
Start: 2023-04-25 | End: 2023-05-09

## 2023-04-25 RX ORDER — HYDROCODONE BITARTRATE AND ACETAMINOPHEN 5; 325 MG/1; MG/1
1 TABLET ORAL EVERY 6 HOURS PRN
Qty: 12 TABLET | Refills: 0 | Status: SHIPPED | OUTPATIENT
Start: 2023-04-25 | End: 2023-04-28

## 2023-04-25 RX ORDER — SODIUM CHLORIDE 0.9 % (FLUSH) 0.9 %
5-40 SYRINGE (ML) INJECTION EVERY 12 HOURS SCHEDULED
Status: DISCONTINUED | OUTPATIENT
Start: 2023-04-25 | End: 2023-04-25 | Stop reason: HOSPADM

## 2023-04-25 RX ORDER — PHENAZOPYRIDINE HYDROCHLORIDE 200 MG/1
200 TABLET, FILM COATED ORAL 3 TIMES DAILY PRN
Qty: 9 TABLET | Refills: 0 | Status: SHIPPED | OUTPATIENT
Start: 2023-04-25

## 2023-04-25 RX ORDER — ACETAMINOPHEN 500 MG
1000 TABLET ORAL ONCE
Status: COMPLETED | OUTPATIENT
Start: 2023-04-25 | End: 2023-04-25

## 2023-04-25 RX ORDER — ONDANSETRON 2 MG/ML
INJECTION INTRAMUSCULAR; INTRAVENOUS PRN
Status: DISCONTINUED | OUTPATIENT
Start: 2023-04-25 | End: 2023-04-25 | Stop reason: SDUPTHER

## 2023-04-25 RX ORDER — HYDRALAZINE HYDROCHLORIDE 20 MG/ML
INJECTION INTRAMUSCULAR; INTRAVENOUS
Status: COMPLETED
Start: 2023-04-25 | End: 2023-04-25

## 2023-04-25 RX ORDER — FENTANYL CITRATE 50 UG/ML
50 INJECTION, SOLUTION INTRAMUSCULAR; INTRAVENOUS EVERY 5 MIN PRN
Status: DISCONTINUED | OUTPATIENT
Start: 2023-04-25 | End: 2023-04-25 | Stop reason: HOSPADM

## 2023-04-25 RX ORDER — SODIUM CHLORIDE 9 MG/ML
INJECTION, SOLUTION INTRAVENOUS PRN
Status: DISCONTINUED | OUTPATIENT
Start: 2023-04-25 | End: 2023-04-25 | Stop reason: HOSPADM

## 2023-04-25 RX ORDER — DEXAMETHASONE SODIUM PHOSPHATE 10 MG/ML
INJECTION, EMULSION INTRAMUSCULAR; INTRAVENOUS PRN
Status: DISCONTINUED | OUTPATIENT
Start: 2023-04-25 | End: 2023-04-25 | Stop reason: SDUPTHER

## 2023-04-25 RX ORDER — FENTANYL CITRATE 50 UG/ML
INJECTION, SOLUTION INTRAMUSCULAR; INTRAVENOUS PRN
Status: DISCONTINUED | OUTPATIENT
Start: 2023-04-25 | End: 2023-04-25 | Stop reason: SDUPTHER

## 2023-04-25 RX ORDER — HYDRALAZINE HYDROCHLORIDE 20 MG/ML
10 INJECTION INTRAMUSCULAR; INTRAVENOUS EVERY 10 MIN PRN
Status: COMPLETED | OUTPATIENT
Start: 2023-04-25 | End: 2023-04-25

## 2023-04-25 RX ORDER — CIPROFLOXACIN 500 MG/1
500 TABLET, FILM COATED ORAL 2 TIMES DAILY
Qty: 6 TABLET | Refills: 0 | Status: SHIPPED | OUTPATIENT
Start: 2023-04-25 | End: 2023-04-28

## 2023-04-25 RX ORDER — SODIUM CHLORIDE 9 MG/ML
INJECTION, SOLUTION INTRAVENOUS CONTINUOUS
Status: DISCONTINUED | OUTPATIENT
Start: 2023-04-25 | End: 2023-04-25 | Stop reason: HOSPADM

## 2023-04-25 RX ORDER — PHENAZOPYRIDINE HYDROCHLORIDE 200 MG/1
200 TABLET, FILM COATED ORAL ONCE
Status: COMPLETED | OUTPATIENT
Start: 2023-04-25 | End: 2023-04-25

## 2023-04-25 RX ORDER — PROPOFOL 10 MG/ML
INJECTION, EMULSION INTRAVENOUS PRN
Status: DISCONTINUED | OUTPATIENT
Start: 2023-04-25 | End: 2023-04-25 | Stop reason: SDUPTHER

## 2023-04-25 RX ORDER — MEPERIDINE HYDROCHLORIDE 25 MG/ML
12.5 INJECTION INTRAMUSCULAR; INTRAVENOUS; SUBCUTANEOUS EVERY 5 MIN PRN
Status: DISCONTINUED | OUTPATIENT
Start: 2023-04-25 | End: 2023-04-25 | Stop reason: HOSPADM

## 2023-04-25 RX ORDER — ONDANSETRON 2 MG/ML
4 INJECTION INTRAMUSCULAR; INTRAVENOUS
Status: COMPLETED | OUTPATIENT
Start: 2023-04-25 | End: 2023-04-25

## 2023-04-25 RX ADMIN — ONDANSETRON 4 MG: 2 INJECTION INTRAMUSCULAR; INTRAVENOUS at 12:15

## 2023-04-25 RX ADMIN — ACETAMINOPHEN 1000 MG: 500 TABLET ORAL at 16:11

## 2023-04-25 RX ADMIN — PROPOFOL 150 MG: 10 INJECTION, EMULSION INTRAVENOUS at 10:58

## 2023-04-25 RX ADMIN — FENTANYL CITRATE 100 MCG: 50 INJECTION, SOLUTION INTRAMUSCULAR; INTRAVENOUS at 10:58

## 2023-04-25 RX ADMIN — HYDRALAZINE HYDROCHLORIDE 10 MG: 20 INJECTION INTRAMUSCULAR; INTRAVENOUS at 12:59

## 2023-04-25 RX ADMIN — HYDRALAZINE HYDROCHLORIDE 10 MG: 20 INJECTION INTRAMUSCULAR; INTRAVENOUS at 13:10

## 2023-04-25 RX ADMIN — ONDANSETRON 4 MG: 2 INJECTION INTRAMUSCULAR; INTRAVENOUS at 15:23

## 2023-04-25 RX ADMIN — Medication 2000 MG: at 11:09

## 2023-04-25 RX ADMIN — SODIUM CHLORIDE: 9 INJECTION, SOLUTION INTRAVENOUS at 08:45

## 2023-04-25 RX ADMIN — DEXAMETHASONE SODIUM PHOSPHATE 10 MG: 10 INJECTION, EMULSION INTRAMUSCULAR; INTRAVENOUS at 11:07

## 2023-04-25 RX ADMIN — PHENAZOPYRIDINE 200 MG: 200 TABLET ORAL at 14:27

## 2023-04-25 RX ADMIN — Medication 100 MG: at 10:58

## 2023-04-25 RX ADMIN — FENTANYL CITRATE 50 MCG: 50 INJECTION, SOLUTION INTRAMUSCULAR; INTRAVENOUS at 12:23

## 2023-04-25 ASSESSMENT — PAIN DESCRIPTION - DESCRIPTORS
DESCRIPTORS: ACHING
DESCRIPTORS: ACHING;DISCOMFORT
DESCRIPTORS: ACHING

## 2023-04-25 ASSESSMENT — PAIN - FUNCTIONAL ASSESSMENT: PAIN_FUNCTIONAL_ASSESSMENT: 0-10

## 2023-04-25 ASSESSMENT — PAIN DESCRIPTION - LOCATION
LOCATION: ABDOMEN
LOCATION: FLANK
LOCATION: ABDOMEN

## 2023-04-25 ASSESSMENT — PAIN SCALES - GENERAL
PAINLEVEL_OUTOF10: 8
PAINLEVEL_OUTOF10: 8
PAINLEVEL_OUTOF10: 5
PAINLEVEL_OUTOF10: 4
PAINLEVEL_OUTOF10: 7

## 2023-04-25 ASSESSMENT — PAIN DESCRIPTION - ORIENTATION
ORIENTATION: LEFT

## 2023-04-25 NOTE — PROGRESS NOTES

## 2023-04-25 NOTE — OP NOTE
Operative Note      Patient: Hakeem Pan  YOB: 1945  MRN: [id number]    Date of Procedure: 4/25/2023    Pre-Op Diagnosis Codes:     * Kidney stone [N20.0] LEFT    Post-Op Diagnosis: Same       Procedure(s):  Left ESWL EXTRACORPOREAL SHOCK WAVE LITHOTRIPSY Left insertion of stent, cystoscopy    Surgeon(s):  Nilda Dos Santos MD    Assistant:   * No surgical staff found *    Anesthesia: General    Estimated Blood Loss (mL): Minimal    Complications: None    Specimens:   * No specimens in log *    Implants:  Implant Name Type Inv. Item Serial No.  Lot No. LRB No. Used Action   STENT URET 6FR L26CM HYDR+ PGTL TAPR [employer] LO - [id number]  [facility] 6FR L26CM HYDR+ PGTL TAPR TIP GRAD BL MRK LO  [employer] UROLOGY- [id number] Left 1 Implanted         Drains: * No LDAs found *    Findings: left kidney stones      Detailed Description of Procedure: All treatment options were dicussed including risks, benefits, alternatives, goals and possible complications. Patient elected above mentioned procedure. Consent was obtained and patient elected to proceed. DESCRIPTION OF PROCEDURE:  The patient was placed in the supine position and underwent general  anesthesia induction. Time out was performed     A rigid cystoscope with a 22 Turkish sheath with 30 degree lens was inserted in the patient's urethral and into the bladder with ease. We then focused our attention on the left ureteral orifice, which we cannulated with our glidewire. Over our glidewire we placed a 6x26 cm double-J ureteral stent and we noted appropriate placement in the upper collecting system using fluoroscopy. We then removed our wire. There was good proximal and distal curl. We did not leave the string at the end of the stent. We then drained the patient's bladder and removed the scope. Using fluoroscopy the stone was visualized in the  Left kidney as noted previously.  The stone was then treated with 3000

## 2023-04-25 NOTE — PROGRESS NOTES
1234- pt to pacu. Resp easy, unlabored. Pt responds to voice. Denies pain, nausea. BP elevated, will monitor. 1241- pt continues to rest in bed eyes closed. Denies pain when asked. 1250- bp continues to be elevated, pt continues to deny pain, nausea. Does report urge to urinate, offered bedpan, pt reports unable to urinate at this time. 1300- pt medicated for bp    1312- pt responding to bp treatment. Pt continues to deny pain, nausea. Resting in bed eyes closed. 1321- pt resting in bed eyes closed. Continues to deny pain.      1330- pt meets criteria for discharge from pacu,     1334- returned to [facility], report given to Milla Quiroz

## 2023-04-25 NOTE — H&P
found.  Constitutional: Patient in no acute distress; Neuro: alert and oriented to person place and time. Psych: Mood and affect normal.  Skin: Normal  Lungs: Respiratory effort normal, CTA  Cardiovascular:  Normal peripheral pulses; no murmur  Abdomen: Soft, non-tender, non-distended with no CVA, flank pain, hepatosplenomegaly or hernia. Kidneys normal.  Bladder non-tender and not distended. LABS:   No results for input(s): WBC, HGB, HCT, MCV, PLT in the last 72 hours. No results for input(s): NA, K, CL, CO2, PHOS, BUN, CREATININE, CA in the last 72 hours. No results found for: PSA        Urinalysis: No results for input(s): COLORU, PHUR, LABCAST, WBCUA, RBCUA, MUCUS, TRICHOMONAS, YEAST, BACTERIA, CLARITYU, SPECGRAV, LEUKOCYTESUR, UROBILINOGEN, Ebbie Rattler in the last 72 hours. Invalid input(s): NITRATE, GLUCOSEUKETONESUAMORPHOUS     -----------------------------------------------------------------      Assessment and Plan   Impression:    Patient Active Problem List   Diagnosis    Benign essential tremor    Endometrioid adenocarcinoma of right ovary ([facility] [address]  s/p surgery 2018    History of total hysterectomy with bilateral salpingo-oophorectomy (BSO)    Obesity, morbid, BMI 40.0-49.9 (HCC)    Primary osteoarthritis of both knees    Chronic fatigue    B12 deficiency    Lung nodules       Plan:   Risks: I discussed all the risks, benefits, alternatives and possible complications or surgery as well as expectations and post-op recovery.       Consent obtained; Left side ESWL with cysto stent placement in OR today    Karen Dougherty MD  7:14 AM 4/25/2023

## 2023-04-25 NOTE — ANESTHESIA POSTPROCEDURE EVALUATION
Department of Anesthesiology  Postprocedure Note    Patient: Carissa Castellanos  MRN: [id number]  YOB: 1945  Date of evaluation: 4/25/2023      Procedure Summary     Date: 04/25/23 Room / Location: [address] [place]    Anesthesia Start: 1054 Anesthesia Stop: 7991    Procedure: Left ESWL EXTRACORPOREAL SHOCK WAVE LITHOTRIPSY Left insertion (Left) Diagnosis:       Kidney stone      (Kidney stone [N20.0])    Surgeons: Ric Julien MD Responsible Provider: Cher Lopez DO    Anesthesia Type: general ASA Status: 3          Anesthesia Type: No value filed.     Julieta Phase I: Julieta Score: 9    Julieta Phase II:        Anesthesia Post Evaluation    Patient location during evaluation: PACU  Patient participation: complete - patient participated  Level of consciousness: awake and alert  Pain score: 3  Airway patency: patent  Nausea & Vomiting: no nausea and no vomiting  Complications: no  Cardiovascular status: hemodynamically stable, blood pressure returned to baseline and hypertensive  Respiratory status: spontaneous ventilation, room air and acceptable  Hydration status: stable

## 2023-04-25 NOTE — PROGRESS NOTES
Pt returned to [facility] room 10. Vitals and assessment as charted. 0.9 infusing, @ 400ml to count from PACU. Pt has water. Family at the bedside. Pt and family verbalized understanding of discharge criteria and call light use. Call light in reach.

## 2023-04-25 NOTE — DISCHARGE INSTRUCTIONS
Ureteral Stent Information  -Ureteral stents are hollow tubes with multiple side holes that coils in your kidney and proceeds down your ureter where it then coils in your bladder                              -Most stents are temporary, if you have a chronic  stent it will need to be exchanged regularly. If left in longer than recommended, serious   complications of severe encrustation, UTI,   and/or obstruction and potential loss of kidney can occur    -Ureteral stents are used to relieve ureteral obstruction and promote ureteral healing following surgery    Why you may have a Stent:  -Ureteral obstruction secondary to kidney stones, ureteral stenosis, ureteral anastomosis, or preventative (prior to ESWL for large stone)    Stent Symptoms  You may not have any symptoms at all   Irritating voiding symptoms; urgency, frequency, feeling of incomplete bladder emptying, burning, blood in urine for up to 1-3 days, straining (all likely due to stent irritating the bladder)  Suprapubic pressure/discomfort  Flank pain    Stent Management  -You will likely be discharged home with:  Pain medication- take as needed for severe pain  Anticholinergic (Oxybutynin, Urispas)- These medications will decrease ureteral and bladder spasms that are likely causing discomfort  Flomax-relaxes ureter  Antibiotic-treats or prevents infection-take medication until completed  *Take all medications as prescribed    *If pain is severe take pain pill, take a hot shower for 10-15 minutes, and then apply heating pad to affected area      -Diet  Normal diet,         Increase water intake!!!! Try to consume at least 2 liters of water a day  AVOID Caffeine-this can make symptoms worse!  -Activity  Avoid strenuous activity!!   Rest when tired  Do not drive if taking narcotic pain medicine  *Call provider if developing symptoms of infection; fever, chills, pus in your urine, new onset body aches    Follow-up is Key part of treatment and safety!!!

## 2023-04-25 NOTE — ANESTHESIA PRE PROCEDURE
Department of Anesthesiology  Preprocedure Note       Name:  Koffi Bejarano   Age:  78 y.o.  :  1945                                          MRN:  [id number]         Date:  2023      Surgeon: Ed Duff):  Delonte Love MD    Procedure: Procedure(s):  Left ESWL EXTRACORPOREAL SHOCK WAVE LITHOTRIPSY    Medications prior to admission:   Prior to Admission medications    Medication Sig Start Date End Date Taking? Authorizing Provider   zinc 50 MG TABS tablet Take 1 tablet by mouth daily    Historical Provider, MD   Multiple Vitamins-Minerals (EYE HEALTH PO) Take by mouth    Historical Provider, MD   triamcinolone (KENALOG) 0.1 % cream Apply topically 2 times daily. No more than 2 weeks. 3/2/23   Brian Danielle MD   primidone (MYSOLINE) 50 MG tablet Take 1 tablet by mouth 3 times daily 3/2/23   Brian Danielle MD   oxybutynin (DITROPAN) 5 MG tablet Take 1 tablet by mouth daily    Historical Provider, MD   Handicap Placard MISC by Does not apply route Expiration in 5 years 22   Brian Danielle MD   vitamin B-12 (CYANOCOBALAMIN) 1000 MCG tablet Take 1 tablet by mouth daily    Historical Provider, MD   albuterol sulfate HFA (VENTOLIN HFA) 108 (90 Base) MCG/ACT inhaler Inhale 2 puffs into the lungs 4 times daily as needed for Wheezing 22   Brian Danielle MD   Nutritional Supplements (VITAMIN D BOOSTER PO) Take 1 tablet by mouth daily Vitamin d3 50 mcg    Historical Provider, MD   celecoxib (CELEBREX) 200 MG capsule Take 1 capsule by mouth daily 3/3/22   Kelby Hughes MD       Current medications:    No current facility-administered medications for this encounter. Allergies:     Allergies   Allergen Reactions    Latex Rash       Problem List:    Patient Active Problem List   Diagnosis Code    Benign essential tremor G25.0    Endometrioid adenocarcinoma of right ovary ([facility] [address]  s/p surgery 2018 C56.1    History of total hysterectomy with bilateral salpingo-oophorectomy (BSO)

## 2023-04-29 ENCOUNTER — HOSPITAL ENCOUNTER (EMERGENCY)
Age: 78
Discharge: HOME | End: 2023-04-29
Attending: EMERGENCY MEDICINE
Payer: MEDICARE

## 2023-04-29 ENCOUNTER — APPOINTMENT (OUTPATIENT)
Dept: CT IMAGING | Age: 78
End: 2023-04-29
Payer: MEDICARE

## 2023-04-29 VITALS
OXYGEN SATURATION: 97 % | HEART RATE: 57 BPM | WEIGHT: 245 LBS | RESPIRATION RATE: 16 BRPM | HEIGHT: 60 IN | SYSTOLIC BLOOD PRESSURE: 152 MMHG | BODY MASS INDEX: 48.1 KG/M2 | DIASTOLIC BLOOD PRESSURE: 54 MMHG | TEMPERATURE: 97.6 F

## 2023-04-29 DIAGNOSIS — N20.0 KIDNEY STONE: Primary | ICD-10-CM

## 2023-04-29 LAB
ALBUMIN SERPL BCG-MCNC: 3.6 G/DL (ref 3.5–5.1)
ALP SERPL-CCNC: 76 U/L (ref 38–126)
ALT SERPL W/O P-5'-P-CCNC: 17 U/L (ref 11–66)
ANION GAP SERPL CALC-SCNC: 11 MEQ/L (ref 8–16)
AST SERPL-CCNC: 21 U/L (ref 5–40)
BACTERIA URNS QL MICRO: ABNORMAL /HPF
BASOPHILS ABSOLUTE: 0 THOU/MM3 (ref 0–0.1)
BASOPHILS NFR BLD AUTO: 0.3 %
BILIRUB SERPL-MCNC: 0.3 MG/DL (ref 0.3–1.2)
BILIRUB UR QL STRIP.AUTO: NEGATIVE
BUN SERPL-MCNC: 17 MG/DL (ref 7–22)
CALCIUM SERPL-MCNC: 10.4 MG/DL (ref 8.5–10.5)
CASTS #/AREA URNS LPF: ABNORMAL /LPF
CASTS 2: ABNORMAL /LPF
CHARACTER UR: CLEAR
CHLORIDE SERPL-SCNC: 103 MEQ/L (ref 98–111)
CO2 SERPL-SCNC: 23 MEQ/L (ref 23–33)
COLOR: ABNORMAL
CREAT SERPL-MCNC: 0.8 MG/DL (ref 0.4–1.2)
CRYSTALS URNS MICRO: ABNORMAL
DEPRECATED RDW RBC AUTO: 43.5 FL (ref 35–45)
EOSINOPHIL NFR BLD AUTO: 1.3 %
EOSINOPHILS ABSOLUTE: 0.1 THOU/MM3 (ref 0–0.4)
EPITHELIAL CELLS, UA: ABNORMAL /HPF
ERYTHROCYTE [DISTWIDTH] IN BLOOD BY AUTOMATED COUNT: 12.5 % (ref 11.5–14.5)
GFR SERPL CREATININE-BSD FRML MDRD: > 60 ML/MIN/1.73M2
GLUCOSE SERPL-MCNC: 98 MG/DL (ref 70–108)
GLUCOSE UR QL STRIP.AUTO: NEGATIVE MG/DL
HCT VFR BLD AUTO: 37.2 % (ref 37–47)
HGB BLD-MCNC: 12.2 GM/DL (ref 12–16)
HGB UR QL STRIP.AUTO: ABNORMAL
IMM GRANULOCYTES # BLD AUTO: 0.03 THOU/MM3 (ref 0–0.07)
IMM GRANULOCYTES NFR BLD AUTO: 0.4 %
KETONES UR QL STRIP.AUTO: ABNORMAL
LYMPHOCYTES ABSOLUTE: 2.4 THOU/MM3 (ref 1–4.8)
LYMPHOCYTES NFR BLD AUTO: 32.6 %
MCH RBC QN AUTO: 30.9 PG (ref 26–33)
MCHC RBC AUTO-ENTMCNC: 32.8 GM/DL (ref 32.2–35.5)
MCV RBC AUTO: 94.2 FL (ref 81–99)
MISCELLANEOUS 2: ABNORMAL
MONOCYTES ABSOLUTE: 0.6 THOU/MM3 (ref 0.4–1.3)
MONOCYTES NFR BLD AUTO: 7.5 %
NEUTROPHILS NFR BLD AUTO: 57.9 %
NITRITE UR QL STRIP: POSITIVE
NRBC BLD AUTO-RTO: 0 /100 WBC
OSMOLALITY SERPL CALC.SUM OF ELEC: 275.3 MOSMOL/KG (ref 275–300)
PH UR STRIP.AUTO: 7 [PH] (ref 5–9)
PLATELET # BLD AUTO: 164 THOU/MM3 (ref 130–400)
PMV BLD AUTO: 11 FL (ref 9.4–12.4)
POTASSIUM SERPL-SCNC: 4 MEQ/L (ref 3.5–5.2)
PROT SERPL-MCNC: 5.5 G/DL (ref 6.1–8)
PROT UR STRIP.AUTO-MCNC: 30 MG/DL
RBC # BLD AUTO: 3.95 MILL/MM3 (ref 4.2–5.4)
RBC URINE: ABNORMAL /HPF
RENAL EPI CELLS #/AREA URNS HPF: ABNORMAL /[HPF]
SEGMENTED NEUTROPHILS ABSOLUTE COUNT: 4.3 THOU/MM3 (ref 1.8–7.7)
SODIUM SERPL-SCNC: 137 MEQ/L (ref 135–145)
SP GR UR REFRACT.AUTO: 1.01 (ref 1–1.03)
UROBILINOGEN, URINE: 1 EU/DL (ref 0–1)
WBC # BLD AUTO: 7.5 THOU/MM3 (ref 4.8–10.8)
WBC #/AREA URNS HPF: ABNORMAL /HPF
WBC #/AREA URNS HPF: ABNORMAL /[HPF]
YEAST LIKE FUNGI URNS QL MICRO: ABNORMAL

## 2023-04-29 PROCEDURE — 74176 CT ABD & PELVIS W/O CONTRAST: CPT

## 2023-04-29 PROCEDURE — 96375 TX/PRO/DX INJ NEW DRUG ADDON: CPT

## 2023-04-29 PROCEDURE — 36415 COLL VENOUS BLD VENIPUNCTURE: CPT

## 2023-04-29 PROCEDURE — 80053 COMPREHEN METABOLIC PANEL: CPT

## 2023-04-29 PROCEDURE — 85025 COMPLETE CBC W/AUTO DIFF WBC: CPT

## 2023-04-29 PROCEDURE — 96374 THER/PROPH/DIAG INJ IV PUSH: CPT

## 2023-04-29 PROCEDURE — 81001 URINALYSIS AUTO W/SCOPE: CPT

## 2023-04-29 PROCEDURE — 99284 EMERGENCY DEPT VISIT MOD MDM: CPT

## 2023-04-29 PROCEDURE — 6360000002 HC RX W HCPCS: Performed by: EMERGENCY MEDICINE

## 2023-04-29 RX ORDER — SULFAMETHOXAZOLE AND TRIMETHOPRIM 800; 160 MG/1; MG/1
1 TABLET ORAL 2 TIMES DAILY
Qty: 14 TABLET | Refills: 0 | Status: SHIPPED | OUTPATIENT
Start: 2023-04-29 | End: 2023-05-06

## 2023-04-29 RX ORDER — ONDANSETRON 2 MG/ML
4 INJECTION INTRAMUSCULAR; INTRAVENOUS ONCE
Status: COMPLETED | OUTPATIENT
Start: 2023-04-29 | End: 2023-04-29

## 2023-04-29 RX ORDER — KETOROLAC TROMETHAMINE 30 MG/ML
30 INJECTION, SOLUTION INTRAMUSCULAR; INTRAVENOUS ONCE
Status: COMPLETED | OUTPATIENT
Start: 2023-04-29 | End: 2023-04-29

## 2023-04-29 RX ORDER — SULFAMETHOXAZOLE AND TRIMETHOPRIM 800; 160 MG/1; MG/1
1 TABLET ORAL 2 TIMES DAILY
Qty: 14 TABLET | Refills: 0 | Status: SHIPPED | OUTPATIENT
Start: 2023-04-29 | End: 2023-04-29 | Stop reason: SDUPTHER

## 2023-04-29 RX ADMIN — KETOROLAC TROMETHAMINE 30 MG: 30 INJECTION, SOLUTION INTRAMUSCULAR; INTRAVENOUS at 18:01

## 2023-04-29 RX ADMIN — ONDANSETRON 4 MG: 2 INJECTION INTRAMUSCULAR; INTRAVENOUS at 18:02

## 2023-04-29 ASSESSMENT — PAIN SCALES - GENERAL
PAINLEVEL_OUTOF10: 2
PAINLEVEL_OUTOF10: 0

## 2023-04-29 NOTE — ED NOTES
Patient ambulated to bathroom and back to bed with steady gait. Placed back onto monitor. Denies needs at this time. Side rails up x2; call light in reach.      Carlos Kwok  04/29/23 1915

## 2023-04-29 NOTE — ED NOTES
Pt states she had lithotripsy on Tuesday, had some emesis after the procedure r/t the anesthesia- pt states she doesn't tolerate narcotics well. Pt removed her ureter stent earlier today as well w/o difficulty. States left sided flank pain started a couple hrs a go, getting better after taking pyridium PTA. Pain 2/10 and very tolerable at this time.       Dulcy Heimlich, RN  04/29/23 9981 [facility] Summers, JULIA  04/29/23 9799

## 2023-04-29 NOTE — ED PROVIDER NOTES
(FLOMAX) 0.4 MG CAPSULE    Take 1 capsule by mouth daily for 14 days    TRIAMCINOLONE (KENALOG) 0.1 % CREAM    Apply topically 2 times daily. No more than 2 weeks. VITAMIN B-12 (CYANOCOBALAMIN) 1000 MCG TABLET    Take 1 tablet by mouth daily    ZINC 50 MG TABS TABLET    Take 1 tablet by mouth daily       ALLERGIES     is allergic to latex. FAMILY HISTORY     She indicated that her mother is . She indicated that her father is . She indicated that the status of her sister is unknown. She indicated that the status of her maternal grandfather is unknown.   family history includes Cancer in her father; Hearing Loss in her maternal grandfather; Mult Sclerosis in her sister. SOCIAL HISTORY      reports that she has never smoked. She has never used smokeless tobacco. She reports that she does not drink alcohol and does not use drugs. PHYSICAL EXAM     INITIAL VITALS:  height is 5' (1.524 m) and weight is 245 lb (111.1 kg). Her oral temperature is 97.6 °F (36.4 °C). Her blood pressure is 190/95 (abnormal) and her pulse is 69. Her respiration is 18 and oxygen saturation is 100%. Constitutional: Uncomfortable appearing  Eyes:  Pupils are equal and reactive  HENT:  Atraumatic appearing  external ears normal, nose normal,  oropharynx moist, no pharyngeal exudates. Neck- normal range of motion, no tenderness, supple   Respiratory:  No wheezing, rhonchi or rales  Cardiovascular: regular  GI:  Non tender, no rigidity, rebound or guarding  :  +left  costovertebral angle tenderness   Musculoskeletal:  2/4 distal pulses, no pitting edema  Integument: warm and dry, no rash   Neurologic:  Alert & oriented x 3      DIAGNOSTIC RESULTS       RADIOLOGY: non-plain film images(s) such as CT, Ultrasound and MRI are read by the radiologist.  CT of the pelvis without contrast interpreted radiologist with stones noted in the left ureter 5 mm and 2 mm.     LABS:   Labs Reviewed   CBC WITH AUTO DIFFERENTIAL -

## 2023-04-29 NOTE — ED NOTES
Report received from [facility].      Agnieszka Cole, JULIA  04/29/23 1913

## 2023-04-30 NOTE — ED NOTES
Patient resting in bed. Respirations easy and unlabored. No distress noted. Call light within reach. Awaiting further orders, will continue to monitor.        Katja Sampson RN  04/29/23 2000

## 2023-05-02 ENCOUNTER — TELEPHONE (OUTPATIENT)
Dept: UROLOGY | Age: 78
End: 2023-05-02

## 2023-05-02 ENCOUNTER — PREP FOR PROCEDURE (OUTPATIENT)
Dept: UROLOGY | Age: 78
End: 2023-05-02

## 2023-05-02 ENCOUNTER — ANESTHESIA EVENT (OUTPATIENT)
Dept: OPERATING ROOM | Age: 78
End: 2023-05-02
Payer: MEDICARE

## 2023-05-02 ENCOUNTER — ANESTHESIA (OUTPATIENT)
Dept: OPERATING ROOM | Age: 78
End: 2023-05-02
Payer: MEDICARE

## 2023-05-02 ENCOUNTER — OFFICE VISIT (OUTPATIENT)
Dept: UROLOGY | Age: 78
End: 2023-05-02
Payer: MEDICARE

## 2023-05-02 ENCOUNTER — HOSPITAL ENCOUNTER (OUTPATIENT)
Age: 78
Setting detail: OUTPATIENT SURGERY
Discharge: HOME | End: 2023-05-02
Attending: UROLOGY | Admitting: UROLOGY
Payer: MEDICARE

## 2023-05-02 VITALS
HEIGHT: 65 IN | DIASTOLIC BLOOD PRESSURE: 70 MMHG | RESPIRATION RATE: 16 BRPM | HEART RATE: 84 BPM | WEIGHT: 250.4 LBS | BODY MASS INDEX: 41.72 KG/M2 | SYSTOLIC BLOOD PRESSURE: 149 MMHG | OXYGEN SATURATION: 99 % | TEMPERATURE: 97.4 F

## 2023-05-02 VITALS — WEIGHT: 250 LBS | BODY MASS INDEX: 49.08 KG/M2 | TEMPERATURE: 97.7 F | HEIGHT: 60 IN

## 2023-05-02 DIAGNOSIS — N20.0 KIDNEY STONE: Primary | ICD-10-CM

## 2023-05-02 DIAGNOSIS — G89.18 POSTOPERATIVE PAIN: Primary | ICD-10-CM

## 2023-05-02 LAB
BILIRUBIN URINE: NEGATIVE
BLOOD URINE, POC: ABNORMAL
CHARACTER, URINE: CLEAR
COLOR, URINE: YELLOW
GLUCOSE URINE: NEGATIVE MG/DL
KETONES, URINE: NEGATIVE
LEUKOCYTE CLUMPS, URINE: ABNORMAL
NITRITE, URINE: POSITIVE
PH, URINE: 7 (ref 5–9)
PROTEIN, URINE: NEGATIVE MG/DL
SPECIFIC GRAVITY, URINE: 1.02 (ref 1–1.03)
UROBILINOGEN, URINE: 0.2 EU/DL (ref 0–1)

## 2023-05-02 PROCEDURE — 3700000001 HC ADD 15 MINUTES (ANESTHESIA): Performed by: UROLOGY

## 2023-05-02 PROCEDURE — C2617 STENT, NON-COR, TEM W/O DEL: HCPCS | Performed by: UROLOGY

## 2023-05-02 PROCEDURE — 1123F ACP DISCUSS/DSCN MKR DOCD: CPT

## 2023-05-02 PROCEDURE — 99214 OFFICE O/P EST MOD 30 MIN: CPT

## 2023-05-02 PROCEDURE — 7100000010 HC PHASE II RECOVERY - FIRST 15 MIN: Performed by: UROLOGY

## 2023-05-02 PROCEDURE — C1769 GUIDE WIRE: HCPCS | Performed by: UROLOGY

## 2023-05-02 PROCEDURE — 3700000000 HC ANESTHESIA ATTENDED CARE: Performed by: UROLOGY

## 2023-05-02 PROCEDURE — 3600000013 HC SURGERY LEVEL 3 ADDTL 15MIN: Performed by: UROLOGY

## 2023-05-02 PROCEDURE — 2580000003 HC RX 258: Performed by: UROLOGY

## 2023-05-02 PROCEDURE — 2709999900 HC NON-CHARGEABLE SUPPLY: Performed by: UROLOGY

## 2023-05-02 PROCEDURE — 6360000002 HC RX W HCPCS: Performed by: NURSE ANESTHETIST, CERTIFIED REGISTERED

## 2023-05-02 PROCEDURE — C1758 CATHETER, URETERAL: HCPCS | Performed by: UROLOGY

## 2023-05-02 PROCEDURE — 81003 URINALYSIS AUTO W/O SCOPE: CPT

## 2023-05-02 PROCEDURE — 7100000000 HC PACU RECOVERY - FIRST 15 MIN: Performed by: UROLOGY

## 2023-05-02 PROCEDURE — 6360000002 HC RX W HCPCS: Performed by: ANESTHESIOLOGY

## 2023-05-02 PROCEDURE — 1090F PRES/ABSN URINE INCON ASSESS: CPT

## 2023-05-02 PROCEDURE — 2500000003 HC RX 250 WO HCPCS: Performed by: NURSE ANESTHETIST, CERTIFIED REGISTERED

## 2023-05-02 PROCEDURE — G8427 DOCREV CUR MEDS BY ELIG CLIN: HCPCS

## 2023-05-02 PROCEDURE — 7100000011 HC PHASE II RECOVERY - ADDTL 15 MIN: Performed by: UROLOGY

## 2023-05-02 PROCEDURE — 7100000001 HC PACU RECOVERY - ADDTL 15 MIN: Performed by: UROLOGY

## 2023-05-02 PROCEDURE — 1036F TOBACCO NON-USER: CPT

## 2023-05-02 PROCEDURE — G8400 PT W/DXA NO RESULTS DOC: HCPCS

## 2023-05-02 PROCEDURE — 3600000003 HC SURGERY LEVEL 3 BASE: Performed by: UROLOGY

## 2023-05-02 PROCEDURE — C1747 HC ENDOSCOPE, SINGLE, URINARY TRACT: HCPCS | Performed by: UROLOGY

## 2023-05-02 PROCEDURE — G8417 CALC BMI ABV UP PARAM F/U: HCPCS

## 2023-05-02 PROCEDURE — 3017F COLORECTAL CA SCREEN DOC REV: CPT

## 2023-05-02 DEVICE — URETERAL STENT
Type: IMPLANTABLE DEVICE | Site: URETER | Status: FUNCTIONAL
Brand: PERCUFLEX™ PLUS

## 2023-05-02 RX ORDER — HYDRALAZINE HYDROCHLORIDE 20 MG/ML
10 INJECTION INTRAMUSCULAR; INTRAVENOUS
Status: DISCONTINUED | OUTPATIENT
Start: 2023-05-02 | End: 2023-05-02 | Stop reason: HOSPADM

## 2023-05-02 RX ORDER — SODIUM CHLORIDE 0.9 % (FLUSH) 0.9 %
5-40 SYRINGE (ML) INJECTION EVERY 12 HOURS SCHEDULED
Status: DISCONTINUED | OUTPATIENT
Start: 2023-05-02 | End: 2023-05-02 | Stop reason: HOSPADM

## 2023-05-02 RX ORDER — SODIUM CHLORIDE 9 MG/ML
INJECTION, SOLUTION INTRAVENOUS PRN
Status: DISCONTINUED | OUTPATIENT
Start: 2023-05-02 | End: 2023-05-02 | Stop reason: HOSPADM

## 2023-05-02 RX ORDER — CEFAZOLIN SODIUM 1 G/3ML
INJECTION, POWDER, FOR SOLUTION INTRAMUSCULAR; INTRAVENOUS PRN
Status: DISCONTINUED | OUTPATIENT
Start: 2023-05-02 | End: 2023-05-02 | Stop reason: SDUPTHER

## 2023-05-02 RX ORDER — TAMSULOSIN HYDROCHLORIDE 0.4 MG/1
0.4 CAPSULE ORAL DAILY
Qty: 10 CAPSULE | Refills: 0 | Status: SHIPPED | OUTPATIENT
Start: 2023-05-02 | End: 2023-05-12

## 2023-05-02 RX ORDER — HYDROCODONE BITARTRATE AND ACETAMINOPHEN 5; 325 MG/1; MG/1
1 TABLET ORAL EVERY 6 HOURS PRN
Qty: 18 TABLET | Refills: 0 | Status: SHIPPED | OUTPATIENT
Start: 2023-05-02 | End: 2023-05-07

## 2023-05-02 RX ORDER — PROPOFOL 10 MG/ML
INJECTION, EMULSION INTRAVENOUS PRN
Status: DISCONTINUED | OUTPATIENT
Start: 2023-05-02 | End: 2023-05-02 | Stop reason: SDUPTHER

## 2023-05-02 RX ORDER — MORPHINE SULFATE 2 MG/ML
2 INJECTION, SOLUTION INTRAMUSCULAR; INTRAVENOUS EVERY 5 MIN PRN
Status: DISCONTINUED | OUTPATIENT
Start: 2023-05-02 | End: 2023-05-02 | Stop reason: HOSPADM

## 2023-05-02 RX ORDER — SODIUM CHLORIDE 0.9 % (FLUSH) 0.9 %
5-40 SYRINGE (ML) INJECTION EVERY 12 HOURS SCHEDULED
Status: CANCELLED | OUTPATIENT
Start: 2023-05-02

## 2023-05-02 RX ORDER — IPRATROPIUM BROMIDE AND ALBUTEROL SULFATE 2.5; .5 MG/3ML; MG/3ML
1 SOLUTION RESPIRATORY (INHALATION) EVERY 4 HOURS PRN
Status: DISCONTINUED | OUTPATIENT
Start: 2023-05-02 | End: 2023-05-02 | Stop reason: HOSPADM

## 2023-05-02 RX ORDER — SODIUM CHLORIDE 9 MG/ML
INJECTION, SOLUTION INTRAVENOUS PRN
Status: CANCELLED | OUTPATIENT
Start: 2023-05-02

## 2023-05-02 RX ORDER — SODIUM CHLORIDE 0.9 % (FLUSH) 0.9 %
5-40 SYRINGE (ML) INJECTION PRN
Status: DISCONTINUED | OUTPATIENT
Start: 2023-05-02 | End: 2023-05-02 | Stop reason: HOSPADM

## 2023-05-02 RX ORDER — FENTANYL CITRATE 50 UG/ML
INJECTION, SOLUTION INTRAMUSCULAR; INTRAVENOUS PRN
Status: DISCONTINUED | OUTPATIENT
Start: 2023-05-02 | End: 2023-05-02 | Stop reason: SDUPTHER

## 2023-05-02 RX ORDER — SODIUM CHLORIDE 0.9 % (FLUSH) 0.9 %
5-40 SYRINGE (ML) INJECTION PRN
Status: CANCELLED | OUTPATIENT
Start: 2023-05-02

## 2023-05-02 RX ORDER — ONDANSETRON 2 MG/ML
INJECTION INTRAMUSCULAR; INTRAVENOUS PRN
Status: DISCONTINUED | OUTPATIENT
Start: 2023-05-02 | End: 2023-05-02 | Stop reason: SDUPTHER

## 2023-05-02 RX ORDER — DEXAMETHASONE SODIUM PHOSPHATE 10 MG/ML
INJECTION, EMULSION INTRAMUSCULAR; INTRAVENOUS PRN
Status: DISCONTINUED | OUTPATIENT
Start: 2023-05-02 | End: 2023-05-02 | Stop reason: SDUPTHER

## 2023-05-02 RX ORDER — FENTANYL CITRATE 50 UG/ML
50 INJECTION, SOLUTION INTRAMUSCULAR; INTRAVENOUS EVERY 5 MIN PRN
Status: DISCONTINUED | OUTPATIENT
Start: 2023-05-02 | End: 2023-05-02 | Stop reason: HOSPADM

## 2023-05-02 RX ORDER — LIDOCAINE HYDROCHLORIDE 20 MG/ML
INJECTION, SOLUTION EPIDURAL; INFILTRATION; INTRACAUDAL; PERINEURAL PRN
Status: DISCONTINUED | OUTPATIENT
Start: 2023-05-02 | End: 2023-05-02 | Stop reason: SDUPTHER

## 2023-05-02 RX ORDER — IPRATROPIUM BROMIDE AND ALBUTEROL SULFATE 2.5; .5 MG/3ML; MG/3ML
1 SOLUTION RESPIRATORY (INHALATION) EVERY 4 HOURS PRN
Status: CANCELLED | OUTPATIENT
Start: 2023-05-02

## 2023-05-02 RX ORDER — CEFDINIR 300 MG/1
300 CAPSULE ORAL 2 TIMES DAILY
Qty: 10 CAPSULE | Refills: 0 | Status: SHIPPED | OUTPATIENT
Start: 2023-05-02 | End: 2023-05-07

## 2023-05-02 RX ADMIN — LIDOCAINE HYDROCHLORIDE 80 MG: 20 INJECTION, SOLUTION EPIDURAL; INFILTRATION; INTRACAUDAL; PERINEURAL at 14:17

## 2023-05-02 RX ADMIN — PROPOFOL 150 MG: 10 INJECTION, EMULSION INTRAVENOUS at 14:18

## 2023-05-02 RX ADMIN — CEFAZOLIN 2 G: 1 INJECTION, POWDER, FOR SOLUTION INTRAMUSCULAR; INTRAVENOUS at 14:28

## 2023-05-02 RX ADMIN — SODIUM CHLORIDE: 9 INJECTION, SOLUTION INTRAVENOUS at 13:32

## 2023-05-02 RX ADMIN — FENTANYL CITRATE 50 MCG: 50 INJECTION, SOLUTION INTRAMUSCULAR; INTRAVENOUS at 14:51

## 2023-05-02 RX ADMIN — FENTANYL CITRATE 50 MCG: 50 INJECTION, SOLUTION INTRAMUSCULAR; INTRAVENOUS at 14:16

## 2023-05-02 RX ADMIN — PROPOFOL 50 MG: 10 INJECTION, EMULSION INTRAVENOUS at 14:23

## 2023-05-02 RX ADMIN — HYDRALAZINE HYDROCHLORIDE 10 MG: 20 INJECTION INTRAMUSCULAR; INTRAVENOUS at 15:15

## 2023-05-02 RX ADMIN — DEXAMETHASONE SODIUM PHOSPHATE 10 MG: 10 INJECTION, EMULSION INTRAMUSCULAR; INTRAVENOUS at 14:29

## 2023-05-02 RX ADMIN — ONDANSETRON 4 MG: 2 INJECTION INTRAMUSCULAR; INTRAVENOUS at 14:27

## 2023-05-02 ASSESSMENT — PAIN SCALES - GENERAL
PAINLEVEL_OUTOF10: 0
PAINLEVEL_OUTOF10: 0

## 2023-05-02 NOTE — PROGRESS NOTES
Patient:  June Almonte  YOB: 1945  Date: 5/2/2023    HISTORY OF PRESENT ILLNESS:   The patient is a 78 y.o. female who presents today for evaluation of the following problems:     5/2/23   Ms. Jim Humphreys is a 56-year-old female that presents to the office today after being evaluated in the ED on 4/29/2023 for a 5 mm kidney stone in the proximal left ureter, immediately distal is a 2 mm stone. Her urine in the ED was grossly remarkable for infection with large blood, positive nitrites, and trace leukocytes. Her WBC, hemoglobin, hematocrit, and creatinine were within normal limits. She has been evaluated by urology in the past is is s/p left ESWL with Dr. Alicia Shannon on 4/25/2023. A stent was left in place, and she was instructed to pull this in 5-7 days. No stent present at today's appointment, pulled on 4/29/2023. Reports increased fatigue and feeling ill overall. Provided with Bactrim in the ED. Overall the problem(s) : show no change. Associated Symptoms: No dysuria, gross hematuria. UA: grossly remarkable for infection- large leukocytes and positive nitrites, also remarkable for moderate blood. Imaging Reviewed during this Office Visit:   (results were independently reviewed by physician and radiology report verified)  I independently reviewed and verified the images and reports from:    CT ABDOMEN PELVIS WO CONTRAST Additional Contrast? None    Result Date: 4/29/2023  CT ABDOMEN AND PELVIS WITHOUT CONTRAST ENHANCEMENT: CLINICAL INFORMATION: left flank pain s/l lithotripsy COMPARISON: 3/13/2023 TECHNIQUE: Multiple axial 5 mm images of the abdomen, pelvis, and lung bases were obtained without the administration of oral or intravenous contrast material. Computer generated sagittal and coronal images of the abdomen and pelvis were also reconstructed.  ALL CT SCANS AT THIS FACILITY use dose modulation, iterative reconstruction, and/or weight-based dosing when appropriate to reduce radiation

## 2023-05-02 NOTE — ANESTHESIA PRE PROCEDURE
Tobacco Use    Smoking status: Never    Smokeless tobacco: Never   Substance Use Topics    Alcohol use: No                                Counseling given: Not Answered      Vital Signs (Current):   Vitals:    05/02/23 1312   BP: (!) 135/91   Pulse: 66   Resp: 16   Temp: 97 °F (36.1 °C)   TempSrc: Temporal   SpO2: 99%   Weight: 250 lb 6.4 oz (113.6 kg)   Height: 5' 4.5\" (1.638 m)                                              BP Readings from Last 3 Encounters:   05/02/23 (!) 135/91   04/29/23 (!) 152/54   04/25/23 (!) 151/88       NPO Status: Time of last liquid consumption: 0800                        Time of last solid consumption: 1800                        Date of last liquid consumption: 05/02/23                        Date of last solid food consumption: 05/01/23    BMI:   Wt Readings from Last 3 Encounters:   05/02/23 250 lb 6.4 oz (113.6 kg)   05/02/23 250 lb (113.4 kg)   04/29/23 245 lb (111.1 kg)     Body mass index is 42.32 kg/m².     CBC:   Lab Results   Component Value Date/Time    WBC 7.5 04/29/2023 05:29 PM    RBC 3.95 04/29/2023 05:29 PM    RBC 4.52 03/08/2017 01:51 PM    HGB 12.2 04/29/2023 05:29 PM    HCT 37.2 04/29/2023 05:29 PM    MCV 94.2 04/29/2023 05:29 PM    RDW 12.3 03/08/2017 01:51 PM     04/29/2023 05:29 PM       CMP:   Lab Results   Component Value Date/Time     04/29/2023 05:29 PM    K 4.0 04/29/2023 05:29 PM    K 4.1 12/08/2021 12:06 PM     04/29/2023 05:29 PM    CO2 23 04/29/2023 05:29 PM    BUN 17 04/29/2023 05:29 PM    CREATININE 0.8 04/29/2023 05:29 PM    LABGLOM >60 04/29/2023 05:29 PM    GLUCOSE 98 04/29/2023 05:29 PM    GLUCOSE 83 03/08/2017 01:51 PM    PROT 5.5 04/29/2023 05:29 PM    CALCIUM 10.4 04/29/2023 05:29 PM    BILITOT 0.3 04/29/2023 05:29 PM    ALKPHOS 76 04/29/2023 05:29 PM    AST 21 04/29/2023 05:29 PM    ALT 17 04/29/2023 05:29 PM       POC Tests: No results for input(s): POCGLU, POCNA, POCK, POCCL, POCBUN, POCHEMO, POCHCT in the last 72

## 2023-05-02 NOTE — DISCHARGE INSTRUCTIONS
Pt ok to discharge home in good condition  No heavy lifting, >10 lbs for today  Pt should avoid strenuous activity for today  Pt should walk moderately at home  Pt ok to shower   Pt may resume diet as tolerated  Pt should take Rx as directed  No driving while on narcotics  Please call attending physician or hospital  with questions  Call or Present to ED if fever (> 101F), intractable nausea vomiting or pain. Rx in chart    Pt should follow up with Sondra Anderson MD, in 6 weeks, call to confirm appointment      Pt should Pull stent in 5 days. There may be some pain associated with the stent removal, which is usually self-limiting. We suggest using the pain medication prescribed for you and a nonsteroidal anti-inflammatory such as Ibuprofen, if you are able to take this medication, to control symptoms. Please stay hydrated. Please call with questions.

## 2023-05-02 NOTE — TELEPHONE ENCOUNTER
SURGERY [address]  [address] [place] [address] [address] [facility] [address] [facility], [address]      Phone *[phone] *[phone]   Surgical Scheduling Direct Line Phone *[phone] Fax *[phone]      Herman Madsen 1945 female    [address] [address] [facility]   Marital Status:          Home Phone: [phone]      Cell Phone:    Telephone Information:   Mobile [phone]   Mobile [phone]          Surgeon: Dr. Katelin Helton Surgery Date: 5/2/2023   Time: 3:00pm    Procedure: Cystoscopy, left ureteroscopy, laser lithotripsy, basket retrieval of stone fragments, and possible left ureteral stent placement.     Diagnosis: kidney stone     Important Medical History:  In [facility]    Special Inst/Equip:     CPT Codes:    84102  Latex Allergy: Yes     Cardiac Device:  No    Anesthesia:  General          Admission Type:  Same Day                        Admit Prior to Day of Surgery: No    Case Location:  Main OR            Preadmission Testing:  Phone Call          PAT Date and Time:______________________________________________________    PAT Confirmation #: ______________________________________________________    Post Op Visit: ___________________________________________________________    Need Preop Cardiac Clearance: No    Does Patient have Cardiologist/physician?     none    Surgery Confirmation #: __________________________________________________    : ________________________   Date: __________________________     [employer] Name: Medicare

## 2023-05-02 NOTE — PROGRESS NOTES
Patient admitted to [facility] room 9 with  at bedside. Bed in low position side rails up call light in reach. Patient denies questions at this time.

## 2023-05-02 NOTE — BRIEF OP NOTE
Brief Postoperative Note      Patient: Magda Mendosa  YOB: 1945  MRN: [id number]    Date of Procedure: 5/2/2023    Pre-Op Diagnosis Codes:     * Kidney stone [N20.0]    Post-Op Diagnosis: Same       Procedure(s):  Cystoscopy,Left ureteroscopy, laser lithotripsyleft ureteral stent placement. Surgeon(s):  Jean Reed MD    Assistant:  * No surgical staff found *    Anesthesia: General    Estimated Blood Loss (mL): Minimal    Complications: None    Specimens:   * No specimens in log *    Implants:  Implant Name Type Inv. Item Serial No.  Lot No. LRB No. Used Action   [employer] 6FR L26CM HYDR+ PGTL [place] [employer] LO - [id number]  [employer] 6FR L26CM HYDR+ PGTL TAPR TIP GRAD BLDR MRK LO  [employer] [facility] UROLOGY-WD [id number] Left 1 Implanted         Drains: * No LDAs found *    Findings: pull stent in five days . Residual stones broken.  F/u luis f in a few weeks no imaging needed      Electronically signed by Sandra Verduzco MD on 5/2/2023 at 3:23 PM

## 2023-05-02 NOTE — H&P
Dada Snyder MD  History and Physical    Patient:  Ranjana Womack  MRN: [id number]  YOB: 1945    HISTORY OF PRESENT ILLNESS:     The patient is a 78 y.o. female who presents with left ureteral stones. Here for procedure. Patient's old records, notes and chart reviewed and summarized above. Dada Snyder MD independently reviewed the images and verified the radiology reports from:    CT ABDOMEN PELVIS WO CONTRAST Additional Contrast? None    Result Date: 4/29/2023  CT ABDOMEN AND PELVIS WITHOUT CONTRAST ENHANCEMENT: CLINICAL INFORMATION: left flank pain s/l lithotripsy COMPARISON: 3/13/2023 TECHNIQUE: Multiple axial 5 mm images of the abdomen, pelvis, and lung bases were obtained without the administration of oral or intravenous contrast material. Computer generated sagittal and coronal images of the abdomen and pelvis were also reconstructed. ALL CT SCANS AT THIS FACILITY use dose modulation, iterative reconstruction, and/or weight-based dosing when appropriate to reduce radiation dose to as low as reasonably achievable. FINDINGS: LUNG BASES: Unremarkable. No infiltrates. No effusions. No lung nodules. LIVER/GALLBLADDER: Liver unremarkable. Prior cholecystectomy. PANCREAS, SPLEEN AND ADRENAL GLANDS: Diffuse fatty infiltration of the pancreas. The spleen is unremarkable. Adrenal glands unremarkable. KIDNEYS:  Large benign 13 mm exophytic cyst right kidney which displaces right kidney anteriorly. In light of the stable/benign appearance of this cyst, future CT imaging to evaluate this cyst would not be warranted. Several nonobstructing calculi in both kidneys. In addition, there is mild hydronephrosis on the left proximal to a 5 mm calculus in the proximal ureter. A smaller 2 mm calculus is seen medially distal to this larger 5 mm calculus. BOWEL/PERITONEUM: There are findings of constipation. A small umbilical hernia is present and contains only adipose tissue.  There is also a small ventral

## 2023-05-02 NOTE — PROGRESS NOTES
Pt returned to [facility] room 9. Vitals and assessment as charted. 0.9 infusing, @950ml to count from PACU. Pt has snack and drink. Family at the bedside. Pt and family verbalized understanding of discharge criteria and call light use. Call light in reach.

## 2023-05-02 NOTE — TELEPHONE ENCOUNTER
Patient is being added to Dr. Herlinda Silva surgery schedule today from ED follow up. Patient to remain NPO until arrival of 1pm to [facility]. Patient voiced understanding. Patient will have an adult over the age of 25 with them at discharge and 24 hours after procedure.

## 2023-05-02 NOTE — ANESTHESIA POSTPROCEDURE EVALUATION
Department of Anesthesiology  Postprocedure Note    Patient: Shimon Tinoco  MRN: [id number]  YOB: 1945  Date of evaluation: 5/2/2023      Procedure Summary     Date: 05/02/23 Room / Location: [facility] / [address]    Anesthesia Start: 9191 Anesthesia Stop: 1500    Procedure: Cystoscopy,Left ureteroscopy, laser lithotripsyleft ureteral stent placement. (Left) Diagnosis:       Kidney stone      (Kidney stone [N20.0])    Surgeons: Jeane Patino MD Responsible Provider: Ksenia Coombs MD    Anesthesia Type: general ASA Status: 3          Anesthesia Type: No value filed.     Julieta Phase I: Julieta Score: 9    Julieta Phase II:        Anesthesia Post Evaluation    Patient location during evaluation: PACU  Patient participation: complete - patient participated  Level of consciousness: awake  Airway patency: patent  Nausea & Vomiting: no vomiting and no nausea  Complications: no  Cardiovascular status: hemodynamically stable  Respiratory status: acceptable  Hydration status: stable

## 2023-05-02 NOTE — PROGRESS NOTES
1458  Awake and oriented on arrival to PACU , HOB elevated , denies any pain or nausea  1510 resting resp easy   1515 BP trending up , medicated with hydralazine 10 mg IV , pt resting resp O2 off   1530 resting resp easy , BP   1552 pt awakens to name , continues to deny pain o\r nausea   1554 meets criteria for discharge , transported to [facility]

## 2023-05-05 NOTE — OP NOTE
[address] [place] [address]. [place]    DATE: 5/2/2023  Patient:  Min Tubbs  MRN: [id number]  YOB: 1945    SURGEON: Tania Fletcher MD.    ASSISTANT: none    PREOPERATIVE DIAGNOSIS: left ureteral stone      POSTOPERATIVE DIAGNOSIS:  left ureteral stone      PROCEDURE PERFORMED: cystoscopy, left ureteroscopy left holmium laser lithotripsy  left stent placement    ANESTHESIA: General    COMPLICATIONS: none    OR BLOOD LOSS:  Minimal    FLUIDS: Cystalloids per Anesthesia    SPECIMENS:  * No specimens in log *      DRAINS: 6 x 26 dbl j stent w string    INDICATIONS FOR PROCEDURE:  The patient is a 78 y.o. female who presents today with Kidney stone [N20.0] here for Cystoscopy,Left ureteroscopy, laser lithotripsyleft ureteral stent placement. . After risks, benefits and alternatives of the procedure were discussed with the patient, the patient elected to proceed. DETAILS OF PROCEDURE:  After informed consent was obtained in the preoperative area, the patient was taken back to the operating room and transferred to the operating table in supine position. Anesthesia was induced and antibiotics were given. The patient was placed in modified dorsal lithotomy position and sterilely prepped and draped in a standard fashion. A timeout occurred. Two patient identifiers were used. We entered the urethra with a 22 Western Maki scope. We focused our attention on the left ureteral orifice. The ureteral orifice was visualized, and using a dual lumen catheter two wires were advanced into the kidney under fluoroscopic guidance. .    The flexible ureteroscope was assembled, place over one Glidewire, and advanced into the ureter carefully under fluoroscopy. The second wire remained in place as a safety. we were able to advance our scope up to the stone without difficulty. The stones were located in the ureter.      We used a 200 micron laser to fragment all stones into

## 2023-05-30 ENCOUNTER — HOSPITAL ENCOUNTER (OUTPATIENT)
Age: 78
Discharge: HOME | End: 2023-05-30
Payer: MEDICARE

## 2023-05-30 DIAGNOSIS — E21.3 HYPERPARATHYROIDISM (HCC): ICD-10-CM

## 2023-05-30 LAB
25(OH)D3 SERPL-MCNC: 25 NG/ML (ref 30–100)
ALBUMIN SERPL BCG-MCNC: 3.9 G/DL (ref 3.5–5.1)
ALP SERPL-CCNC: 89 U/L (ref 38–126)
ALT SERPL W/O P-5'-P-CCNC: 13 U/L (ref 11–66)
ANION GAP SERPL CALC-SCNC: 13 MEQ/L (ref 8–16)
AST SERPL-CCNC: 16 U/L (ref 5–40)
BILIRUB SERPL-MCNC: 0.4 MG/DL (ref 0.3–1.2)
BUN SERPL-MCNC: 7 MG/DL (ref 7–22)
CALCIUM SERPL-MCNC: 10.6 MG/DL (ref 8.5–10.5)
CHLORIDE SERPL-SCNC: 109 MEQ/L (ref 98–111)
CO2 SERPL-SCNC: 22 MEQ/L (ref 23–33)
CREAT SERPL-MCNC: 0.7 MG/DL (ref 0.4–1.2)
GFR SERPL CREATININE-BSD FRML MDRD: > 60 ML/MIN/1.73M2
GLUCOSE SERPL-MCNC: 101 MG/DL (ref 70–108)
IONIZED CALCIUM, WHOLE BLOOD: 1.42 MMOL/L (ref 1.12–1.32)
MAGNESIUM SERPL-MCNC: 2 MG/DL (ref 1.6–2.4)
POTASSIUM SERPL-SCNC: 3.8 MEQ/L (ref 3.5–5.2)
PROT SERPL-MCNC: 6.4 G/DL (ref 6.1–8)
PTH-INTACT SERPL-MCNC: 171.3 PG/ML (ref 15–65)
SODIUM SERPL-SCNC: 144 MEQ/L (ref 135–145)

## 2023-05-30 PROCEDURE — 83970 ASSAY OF PARATHORMONE: CPT

## 2023-05-30 PROCEDURE — 36415 COLL VENOUS BLD VENIPUNCTURE: CPT

## 2023-05-30 PROCEDURE — 83735 ASSAY OF MAGNESIUM: CPT

## 2023-05-30 PROCEDURE — 82306 VITAMIN D 25 HYDROXY: CPT

## 2023-05-30 PROCEDURE — 80053 COMPREHEN METABOLIC PANEL: CPT

## 2023-05-30 PROCEDURE — 82330 ASSAY OF CALCIUM: CPT

## 2023-06-08 ENCOUNTER — TELEPHONE (OUTPATIENT)
Dept: UROLOGY | Age: 78
End: 2023-06-08

## 2023-06-08 ENCOUNTER — OFFICE VISIT (OUTPATIENT)
Dept: UROLOGY | Age: 78
End: 2023-06-08
Payer: MEDICARE

## 2023-06-08 VITALS — BODY MASS INDEX: 42.85 KG/M2 | RESPIRATION RATE: 18 BRPM | WEIGHT: 251 LBS | HEIGHT: 64 IN

## 2023-06-08 DIAGNOSIS — E21.3 HYPERPARATHYROIDISM (HCC): ICD-10-CM

## 2023-06-08 DIAGNOSIS — N20.0 KIDNEY STONE: Primary | ICD-10-CM

## 2023-06-08 LAB
BILIRUBIN URINE: NEGATIVE
BLOOD URINE, POC: ABNORMAL
CHARACTER, URINE: CLEAR
COLOR, URINE: YELLOW
GLUCOSE URINE: NEGATIVE MG/DL
KETONES, URINE: NEGATIVE
LEUKOCYTE CLUMPS, URINE: NEGATIVE
NITRITE, URINE: NEGATIVE
PH, URINE: 6.5 (ref 5–9)
PROTEIN, URINE: NEGATIVE MG/DL
SPECIFIC GRAVITY, URINE: 1.02 (ref 1–1.03)
UROBILINOGEN, URINE: 0.2 EU/DL (ref 0–1)

## 2023-06-08 PROCEDURE — 99024 POSTOP FOLLOW-UP VISIT: CPT

## 2023-06-08 PROCEDURE — 81003 URINALYSIS AUTO W/O SCOPE: CPT

## 2023-06-08 NOTE — PATIENT INSTRUCTIONS
DIETARY INSTRUCTIONS FOR KIDNEY STONE    Kidney / Ureteral stones are formed by the normal chemicals which are present in the urine. If the urine gets super concentrated by any of the common ingredients e.g. calcium, uric acid, oxalate or phosphate, the stone is formed. The three most important changes, which you should make in your diet to prevent recurrence, are as follows:  First and the most important are to drink enough water to make at least 2 quarts of urine per day. The type of water (bottled, filtered, hard or soft) has very little relevance. Drink at least 3 quarts of water a day, may be more in hot weather. Limit your salt intake to no more than 3 grams/day. Remember that most of the processed foods are very high in sodium. When you eat large amounts of salty foods/snacks, the kidney will eliminate that excess salt along with calcium, which is the most common composition of stone. You do not have to limit your calcium intake (up to one gram), especially for women to prevent osteoporosis. Natural foods (low fat dairy products) as the source of calcium are preferred over pills and may in fact prevent stones. Limit your Protein (meat, fish or poultry) consumption to no more than 8 ounces a day. Kidney stones are more common in people consuming large amounts of animal proteins, especially red meat. Increase vegetables/fruit consumption. Other common guidelines for stone prevention are:  Citric Acid in urine prevents stone crystallization. Increase citrate consumption by drinking orange juice, lemonade or diluted lemon juice. One good/inexpensive way to achieve this goal is by mixing 4 ounces of lemon juice in 2 liter of tap water to be used in 24-hour period. Limit oxalate consumption. Oxalate is one of the most common contents of kidney stones. Foods rich in oxalate are all types of nuts, tea, spinach, rhubarb, cranberry, chocolate and black pepper.    If your medical history includes gastric

## 2023-06-08 NOTE — TELEPHONE ENCOUNTER
Patient scheduled for US RENAL COMP  at [facility] MR on 7/6/2023 ARRIVAL OF 745AM FOR AN 8AM SCAN. NO CARBONATED BEVERAGES; ARRIVE WELL HYDRATED WITH A FULL BLADDER.     Order  with instructions  given to the patient in the office

## 2023-06-08 NOTE — PROGRESS NOTES
Patient:  Tremaine Kat  YOB: 1945  Date: 6/8/2023    HISTORY OF PRESENT ILLNESS:   The patient is a 78 y.o. female who presents today for evaluation of the following problems: Post-Operative assessment      6/8/23   Ms. Cm Wells is a 80-year-old female that presents to the office today after being evaluated in the ED on 4/29/2023 for a 5 mm kidney stone in the proximal left ureter, immediately distal is a 2 mm stone. She had been evaluated by urology in the past for kidney stones and is s/p left ESWL with Dr. Dell Walker on 4/25/2023. She reported feeling fatigued, unwell, and was in significant pain at her prior office visit. She subsequently underwent a cystoscopy, left ureteroscopy, laser lithotripsy, and left ureteral stent placement with Dr. Clyde Wallace on 5/2/2023. The residual stones were broken up. Her medical history is remarkable for elevated calcium secondary to issues with her parathyroid. She follows with Dr. Kalyan Carbone for hyperparathyroidism. Reports that her endocrinologist believes that her parathyroid will likely need to be removed. Two previous stone episodes dating back 10 years for which she needed surgical intervention. Has not had further workup for kidney stone prevention. No previous visits with Urology. Overall the problem(s) : are improving. Associated Symptoms: No dysuria, gross hematuria. No fevers or chills. No nausea or vomiting. Pain Controlled.     UA: moderate blood but otherwise negative for infection   Pain Scale 0        Last BUN and creatinine:  Lab Results   Component Value Date    BUN 7 05/30/2023     Lab Results   Component Value Date    CREATININE 0.7 05/30/2023       PAST MEDICAL, FAMILY AND SOCIAL HISTORY UPDATE:  Past Medical History:   Diagnosis Date    Arthritis     COVID-19 2020    scar tissue from this in both lungs per pt    Nephrolithiasis     Ovarian cancer ([facility] [address] 04/18/2018    Thyroid disease     just found out that  wants to see endocinroloisgt

## 2023-06-21 ENCOUNTER — TELEPHONE (OUTPATIENT)
Dept: ENT CLINIC | Age: 78
End: 2023-06-21

## 2023-06-21 NOTE — TELEPHONE ENCOUNTER
Patient is calling because Dr. Michelle Espino told her he spoke with Dr. Rosalia Amaya and Dr Rosalia Amaya agreed to expedite a thyroid surgery for patient. This patient has never been seen in our office. Please advise.

## 2023-06-22 NOTE — TELEPHONE ENCOUNTER
I spoke with Dr. Angelique Mckeon. He also messaged Dr. Cameron Schultz. He wants patient to have her scan done first and see results before making a plan. I am tentatively holding time 08/11. Would need about 1.5 hrs for parathyroidectomy. I asked patient to cb when scan is completed and I will give results to Dr Angelique Mckeon. She voiced understanding.

## 2023-07-11 ENCOUNTER — SCHEDULED TELEPHONE ENCOUNTER (OUTPATIENT)
Dept: UROLOGY | Age: 78
End: 2023-07-11
Payer: MEDICARE

## 2023-07-11 DIAGNOSIS — N20.0 KIDNEY STONE: Primary | ICD-10-CM

## 2023-07-11 PROCEDURE — 99441 PR PHYS/QHP TELEPHONE EVALUATION 5-10 MIN: CPT | Performed by: UROLOGY

## 2023-07-13 NOTE — PROGRESS NOTES
Chet Marie is a 78 y.o. female evaluated via telephone on 7/11/2023 for      Documentation:  I communicated with the patient and/or health care decision maker about kidney stones. Details of this discussion including any medical advice provided:     imaging independently reviewed by Chandu Valladares MD and radiology report verified demonstrating     XR ABDOMEN (KUB) (SINGLE AP VIEW)    Result Date: 6/13/2023  PROCEDURE: XR ABDOMEN (KUB) (SINGLE AP VIEW) CLINICAL INFORMATION: Kidney stone COMPARISON: 3/22/2023 TECHNIQUE: 2 supine images of the abdomen were obtained. FINDINGS: No abnormally dilated bowel loops are seen. Large amount of fecal material throughout the colon, consistent with constipation. Small 7 mm calculus lower pole left kidney. The overall calculus burden left side has decreased relative to the prior study. There also 2 foci of calcification in the lower pole right kidney, unchanged from prior study. There is also a large exophytic cyst right kidney, better seen on prior CT scan. Degenerative facet arthropathy lower lumbar spine. Prior cholecystectomy. There are a few phleboliths in the pelvis. 1. Persistent calculi lower pole right kidney, unchanged from prior study. 2. Small 7 mm calculus left kidney lower pole. Overall calculus burden left side has decreased relative to the prior study. **This report has been created using voice recognition software. It may contain minor errors which are inherent in voice recognition technology. ** Final report electronically signed by Dr. Alfredo Garcia on 6/13/2023 3:38 PM    US RENAL COMPLETE    Result Date: 6/13/2023  PROCEDURE: US RENAL COMPLETE CLINICAL INFORMATION: Kidney stone TECHNIQUE: Ultrasound of the kidneys and urinary bladder was performed. Grayscale and color images were obtained. COMPARISON: Renal ultrasound 1/31/2023 FINDINGS: The right kidney measures 10.2 x 6.2 x 5.6 cm and the left kidney measures 10.7 x 5.0 x 6.4 cm.  Renal cortical

## 2023-07-18 ENCOUNTER — HOSPITAL ENCOUNTER (OUTPATIENT)
Dept: NUCLEAR MEDICINE | Age: 78
Discharge: HOME | End: 2023-07-18
Attending: INTERNAL MEDICINE
Payer: MEDICARE

## 2023-07-18 DIAGNOSIS — E21.3 HYPERPARATHYROIDISM (HCC): ICD-10-CM

## 2023-07-18 PROCEDURE — A9500 TC99M SESTAMIBI: HCPCS | Performed by: INTERNAL MEDICINE

## 2023-07-18 PROCEDURE — 3430000000 HC RX DIAGNOSTIC RADIOPHARMACEUTICAL: Performed by: INTERNAL MEDICINE

## 2023-07-18 PROCEDURE — 78072 PARATHYRD PLANAR W/SPECT&CT: CPT

## 2023-07-18 RX ORDER — TETRAKIS(2-METHOXYISOBUTYLISOCYANIDE)COPPER(I) TETRAFLUOROBORATE 1 MG/ML
26.5 INJECTION, POWDER, LYOPHILIZED, FOR SOLUTION INTRAVENOUS
Status: COMPLETED | OUTPATIENT
Start: 2023-07-18 | End: 2023-07-18

## 2023-07-18 RX ADMIN — Medication 26.5 MILLICURIE: at 10:05

## 2023-07-20 ENCOUNTER — TELEPHONE (OUTPATIENT)
Dept: ENT CLINIC | Age: 78
End: 2023-07-20

## 2023-07-20 NOTE — TELEPHONE ENCOUNTER
Patient called to advise she had her testing completed. I advised her that I saw that it was done yesterday and left a note for Dr Desirae Caballero to review. He was in surgery yesterday but hoped he would get to it today. She voiced understanding.

## 2023-07-20 NOTE — TELEPHONE ENCOUNTER
I spoke with Dr Sudhakar Donovan regarding testing. He said it is negative but she needs a 4-D CT to check for parathyroid adenoma. Since she is not yet our patient, he would like Dr. Juliane Sepulveda to get this ordered. I called Dr. Reyes Genoveva office to advise.

## 2023-07-25 ENCOUNTER — OFFICE VISIT (OUTPATIENT)
Dept: ENT CLINIC | Age: 78
End: 2023-07-25
Payer: MEDICARE

## 2023-07-25 VITALS
SYSTOLIC BLOOD PRESSURE: 128 MMHG | RESPIRATION RATE: 20 BRPM | HEART RATE: 67 BPM | OXYGEN SATURATION: 98 % | BODY MASS INDEX: 43.01 KG/M2 | DIASTOLIC BLOOD PRESSURE: 80 MMHG | TEMPERATURE: 97.2 F | WEIGHT: 251.9 LBS | HEIGHT: 64 IN

## 2023-07-25 DIAGNOSIS — E21.0 PRIMARY HYPERPARATHYROIDISM (HCC): Primary | ICD-10-CM

## 2023-07-25 PROCEDURE — 3017F COLORECTAL CA SCREEN DOC REV: CPT | Performed by: OTOLARYNGOLOGY

## 2023-07-25 PROCEDURE — 1123F ACP DISCUSS/DSCN MKR DOCD: CPT | Performed by: OTOLARYNGOLOGY

## 2023-07-25 PROCEDURE — G8427 DOCREV CUR MEDS BY ELIG CLIN: HCPCS | Performed by: OTOLARYNGOLOGY

## 2023-07-25 PROCEDURE — 1090F PRES/ABSN URINE INCON ASSESS: CPT | Performed by: OTOLARYNGOLOGY

## 2023-07-25 PROCEDURE — 99204 OFFICE O/P NEW MOD 45 MIN: CPT | Performed by: OTOLARYNGOLOGY

## 2023-07-25 PROCEDURE — G8400 PT W/DXA NO RESULTS DOC: HCPCS | Performed by: OTOLARYNGOLOGY

## 2023-07-25 PROCEDURE — 1036F TOBACCO NON-USER: CPT | Performed by: OTOLARYNGOLOGY

## 2023-07-25 PROCEDURE — G8417 CALC BMI ABV UP PARAM F/U: HCPCS | Performed by: OTOLARYNGOLOGY

## 2023-07-25 RX ORDER — OMEGA-3S/DHA/EPA/FISH OIL/D3 300MG-1000
400 CAPSULE ORAL DAILY
COMMUNITY

## 2023-07-25 ASSESSMENT — ENCOUNTER SYMPTOMS
NAUSEA: 0
FACIAL SWELLING: 0
CHEST TIGHTNESS: 0
SHORTNESS OF BREATH: 0
COLOR CHANGE: 0
VOMITING: 0
SORE THROAT: 0
DIARRHEA: 0
SINUS PRESSURE: 0
VOICE CHANGE: 0
STRIDOR: 0
APNEA: 0
CHOKING: 0
TROUBLE SWALLOWING: 0
ABDOMINAL PAIN: 0
COUGH: 0
RHINORRHEA: 0
WHEEZING: 0

## 2023-07-25 NOTE — PROGRESS NOTES
[facility] [place] [facility]  [facility] EAR, NOSE AND THROAT  [address] [facility]  [address] [place] [address],[address]  Dept: [phone]  Dept Fax: [phone]  Loc: [phone]    Jevon Queen is a 78 y.o. female who was referred by Rudy Harvey MD for: Primary hyperparathyroidism  Chief Complaint   Patient presents with    New Patient     New patient is here for hyperparathyroidism, referred by Dr. Glynn Hernandez. Patient states that she has a lot of kidney stones. Patient has had issues with kidney stones for atleast 10 years. Patient had labs on 05/30/23 .3 H, Calcium 1.42 H, Vitamin D 25 L   .    HPI:     Jevon Queen is a 78 y.o. female who presents today for evaluation and possibly scheduling for parathyroidectomy. She has labs that are consistent with primary hyperparathyroidism, but recent sestamibi scan was not productive. No localization was obtained. No ultrasound of the neck is found. She is quite symptomatic from her hyperparathyroidism, including bilateral nephrolithiasis. Nuclear medicine scan and renal ultrasound are reviewed along with many labs. History:      Allergies   Allergen Reactions    Latex Rash     Current Outpatient Medications   Medication Sig Dispense Refill    vitamin D3 (CHOLECALCIFEROL) 10 MCG (400 UNIT) TABS tablet Take 1 tablet by mouth daily      zinc 50 MG TABS tablet Take 1 tablet by mouth daily      Multiple Vitamins-Minerals (EYE HEALTH PO) Take by mouth      primidone (MYSOLINE) 50 MG tablet Take 1 tablet by mouth 3 times daily 270 tablet 3    oxybutynin (DITROPAN) 5 MG tablet Take 1 tablet by mouth daily      Handicap Placard MISC by Does not apply route Expiration in 5 years 1 each 0    vitamin B-12 (CYANOCOBALAMIN) 1000 MCG tablet Take 1 tablet by mouth daily      Nutritional Supplements (VITAMIN D BOOSTER PO) Take 1 tablet by mouth daily Vitamin d3 50 mcg      celecoxib (CELEBREX) 200 MG capsule Take 1 capsule by mouth daily 90 capsule 3

## 2023-08-01 ENCOUNTER — HOSPITAL ENCOUNTER (OUTPATIENT)
Dept: CT IMAGING | Age: 78
Discharge: HOME | End: 2023-08-01
Attending: OTOLARYNGOLOGY
Payer: MEDICARE

## 2023-08-01 DIAGNOSIS — E21.0 PRIMARY HYPERPARATHYROIDISM (HCC): ICD-10-CM

## 2023-08-01 LAB
CREAT BLD-MCNC: 0.7 MG/DL (ref 0.5–1.2)
GFR SERPL CREATININE-BSD FRML MDRD: > 60 ML/MIN/1.73M2

## 2023-08-01 PROCEDURE — 6360000004 HC RX CONTRAST MEDICATION: Performed by: OTOLARYNGOLOGY

## 2023-08-01 PROCEDURE — 70492 CT SFT TSUE NCK W/O & W/DYE: CPT

## 2023-08-01 PROCEDURE — 82565 ASSAY OF CREATININE: CPT

## 2023-08-01 RX ADMIN — IOPAMIDOL 85 ML: 755 INJECTION, SOLUTION INTRAVENOUS at 13:39

## 2023-08-07 ENCOUNTER — OFFICE VISIT (OUTPATIENT)
Dept: ENT CLINIC | Age: 78
End: 2023-08-07
Payer: MEDICARE

## 2023-08-07 ENCOUNTER — NURSE ONLY (OUTPATIENT)
Dept: LAB | Age: 78
End: 2023-08-07

## 2023-08-07 VITALS
RESPIRATION RATE: 20 BRPM | TEMPERATURE: 97.5 F | DIASTOLIC BLOOD PRESSURE: 84 MMHG | SYSTOLIC BLOOD PRESSURE: 130 MMHG | HEART RATE: 68 BPM | BODY MASS INDEX: 43.76 KG/M2 | WEIGHT: 256.3 LBS | OXYGEN SATURATION: 98 % | HEIGHT: 64 IN

## 2023-08-07 DIAGNOSIS — E83.52 HYPERCALCEMIA: ICD-10-CM

## 2023-08-07 DIAGNOSIS — Z01.818 PREOP TESTING: ICD-10-CM

## 2023-08-07 DIAGNOSIS — Z01.818 PREOP TESTING: Primary | ICD-10-CM

## 2023-08-07 DIAGNOSIS — E21.0 PRIMARY HYPERPARATHYROIDISM (HCC): Primary | ICD-10-CM

## 2023-08-07 DIAGNOSIS — R22.1 NECK MASS: ICD-10-CM

## 2023-08-07 LAB
BASOPHILS ABSOLUTE: 0.1 THOU/MM3 (ref 0–0.1)
BASOPHILS NFR BLD AUTO: 0.6 %
DEPRECATED RDW RBC AUTO: 47.6 FL (ref 35–45)
EOSINOPHIL NFR BLD AUTO: 2.1 %
EOSINOPHILS ABSOLUTE: 0.2 THOU/MM3 (ref 0–0.4)
ERYTHROCYTE [DISTWIDTH] IN BLOOD BY AUTOMATED COUNT: 13.2 % (ref 11.5–14.5)
HCT VFR BLD AUTO: 42.4 % (ref 37–47)
HGB BLD-MCNC: 13.6 GM/DL (ref 12–16)
IMM GRANULOCYTES # BLD AUTO: 0.02 THOU/MM3 (ref 0–0.07)
IMM GRANULOCYTES NFR BLD AUTO: 0.2 %
LYMPHOCYTES ABSOLUTE: 3.1 THOU/MM3 (ref 1–4.8)
LYMPHOCYTES NFR BLD AUTO: 32.5 %
MCH RBC QN AUTO: 31.4 PG (ref 26–33)
MCHC RBC AUTO-ENTMCNC: 32.1 GM/DL (ref 32.2–35.5)
MCV RBC AUTO: 97.9 FL (ref 81–99)
MONOCYTES ABSOLUTE: 0.6 THOU/MM3 (ref 0.4–1.3)
MONOCYTES NFR BLD AUTO: 6.2 %
NEUTROPHILS NFR BLD AUTO: 58.4 %
NRBC BLD AUTO-RTO: 0 /100 WBC
PLATELET # BLD AUTO: 205 THOU/MM3 (ref 130–400)
PMV BLD AUTO: 11.4 FL (ref 9.4–12.4)
RBC # BLD AUTO: 4.33 MILL/MM3 (ref 4.2–5.4)
SEGMENTED NEUTROPHILS ABSOLUTE COUNT: 5.5 THOU/MM3 (ref 1.8–7.7)
WBC # BLD AUTO: 9.5 THOU/MM3 (ref 4.8–10.8)

## 2023-08-07 PROCEDURE — G8427 DOCREV CUR MEDS BY ELIG CLIN: HCPCS | Performed by: OTOLARYNGOLOGY

## 2023-08-07 PROCEDURE — 99214 OFFICE O/P EST MOD 30 MIN: CPT | Performed by: OTOLARYNGOLOGY

## 2023-08-07 PROCEDURE — G8417 CALC BMI ABV UP PARAM F/U: HCPCS | Performed by: OTOLARYNGOLOGY

## 2023-08-07 PROCEDURE — 1090F PRES/ABSN URINE INCON ASSESS: CPT | Performed by: OTOLARYNGOLOGY

## 2023-08-07 PROCEDURE — 1036F TOBACCO NON-USER: CPT | Performed by: OTOLARYNGOLOGY

## 2023-08-07 PROCEDURE — 1123F ACP DISCUSS/DSCN MKR DOCD: CPT | Performed by: OTOLARYNGOLOGY

## 2023-08-07 PROCEDURE — G8400 PT W/DXA NO RESULTS DOC: HCPCS | Performed by: OTOLARYNGOLOGY

## 2023-08-07 PROCEDURE — 3017F COLORECTAL CA SCREEN DOC REV: CPT | Performed by: OTOLARYNGOLOGY

## 2023-08-07 ASSESSMENT — ENCOUNTER SYMPTOMS
NAUSEA: 0
VOMITING: 0
VOICE CHANGE: 0
CHOKING: 0
SHORTNESS OF BREATH: 0
SINUS PRESSURE: 0
CHEST TIGHTNESS: 0
RHINORRHEA: 0
DIARRHEA: 0
COUGH: 0
STRIDOR: 0
TROUBLE SWALLOWING: 0
FACIAL SWELLING: 0
COLOR CHANGE: 0
APNEA: 0
ABDOMINAL PAIN: 0
SORE THROAT: 0
WHEEZING: 0

## 2023-08-07 NOTE — PROGRESS NOTES
potential need for re-exploration. The unpredictable locations of the glands was mentioned, as was the potential for more than one adenoma to be hypertrophic. No guarantees were made about outcome. In an effort to gather information on hyperactivity, a Sestamibi scan will be obtained and the patient will come back to review this before surgery. Surgery will be scheduled in the near future. Medications to be held:  Celebrex         Oz Rod. Mary Alice Lopez MD    **This report has been created using voice recognition software. It may contain minor errors which are inherent in voicerecognition technology. **

## 2023-08-08 NOTE — PROGRESS NOTES
Follow all instructions given by your physician  NPO after midnight  Sips of water am of surgery with allowed medications  Bring insurance info and 's license  Wear clean comfortable , loose-fitting clothing  No jewelry or contact lenses to be worn day of surgery  No glue on dentures morning of surgery; you will be asked to remove them for surgery. Case for glasses. Shower the night before and the morning of surgery with a liquid antibacterial soap, dry with new fresh clean towel after each shower, no lotions, creams or powder. Clean sheets and pillowcase on bed night before surgery  Bring medications in original bottles  Bring CPAP/BIPAP machine if you have one ( you may be charged if one is needed in recovery room )    Our pharmacy has a Meds to [facility] program where they will deliver any new prescriptions you may have to your room before you leave. Our Pharmacy will clear it through your insurance; for example (same co pay). This enables you to take your new RX as soon as you need when you get home and avoids stop/wait delays on the way home. Please have a form of payment with you and have someone designated as your Pharmacy contact with their phone # as you may not feel well or still be under the influence of anesthesia. Please refer to the SSI-Surgical Site Infection Flyer you hopefully received in the mail-together we can prevent infections; signs and symptoms reviewed. When discharged be sure you understand how to care for your wound and that you have the Dr./office phone # to call if you have any concerns or questions about your wound.      needed at discharge and someone over 18 to stay with you for 24 hours overnight (surgery may be cancelled if you don't have this)  Report to [facility] on 2nd floor  If you would become ill prior to surgery, please call the surgeon  May have a visitor with you, we request that you limit to 2 visitors in pre-op area  Masks are recommended but not required, new

## 2023-08-09 ENCOUNTER — TELEPHONE (OUTPATIENT)
Dept: ENT CLINIC | Age: 78
End: 2023-08-09

## 2023-08-09 ENCOUNTER — TELEPHONE (OUTPATIENT)
Dept: FAMILY MEDICINE CLINIC | Age: 78
End: 2023-08-09

## 2023-08-09 ENCOUNTER — HOSPITAL ENCOUNTER (OUTPATIENT)
Age: 78
Discharge: HOME | DRG: 626 | End: 2023-08-09
Payer: MEDICARE

## 2023-08-09 ENCOUNTER — ANESTHESIA EVENT (OUTPATIENT)
Dept: OPERATING ROOM | Age: 78
End: 2023-08-09
Payer: MEDICARE

## 2023-08-09 ENCOUNTER — PREP FOR PROCEDURE (OUTPATIENT)
Dept: ENT CLINIC | Age: 78
End: 2023-08-09

## 2023-08-09 DIAGNOSIS — E21.0 PRIMARY HYPERPARATHYROIDISM (HCC): Primary | ICD-10-CM

## 2023-08-09 DIAGNOSIS — R22.1 NECK MASS: ICD-10-CM

## 2023-08-09 DIAGNOSIS — E83.52 HYPERCALCEMIA: ICD-10-CM

## 2023-08-09 DIAGNOSIS — Z01.818 PREOP TESTING: ICD-10-CM

## 2023-08-09 DIAGNOSIS — Z01.818 PREOP TESTING: Primary | ICD-10-CM

## 2023-08-09 PROCEDURE — 93005 ELECTROCARDIOGRAM TRACING: CPT

## 2023-08-09 NOTE — TELEPHONE ENCOUNTER
Patient came in wanting to let you know that she is having surgery Friday morning on her parathyroid it is producing to much calcium. She did say if you wanted to know more it should be in her chart.

## 2023-08-09 NOTE — PROGRESS NOTES
Reply from anesthesia Dr Kenzie Martinez from anesthesia would like EKG repeated before surgery sent message to  Liane at Dr Madeline Merino office

## 2023-08-09 NOTE — TELEPHONE ENCOUNTER
\  I called patient to advise. She will have done today or tomorrow at [facility], [place]. Orders placed.

## 2023-08-10 PROCEDURE — 93010 ELECTROCARDIOGRAM REPORT: CPT | Performed by: INTERNAL MEDICINE

## 2023-08-11 ENCOUNTER — HOSPITAL ENCOUNTER (INPATIENT)
Age: 78
LOS: 1 days | Discharge: HOME | DRG: 626 | End: 2023-08-12
Attending: OTOLARYNGOLOGY | Admitting: OTOLARYNGOLOGY
Payer: MEDICARE

## 2023-08-11 ENCOUNTER — ANESTHESIA (OUTPATIENT)
Dept: OPERATING ROOM | Age: 78
End: 2023-08-11
Payer: MEDICARE

## 2023-08-11 DIAGNOSIS — E21.0 PRIMARY HYPERPARATHYROIDISM (HCC): ICD-10-CM

## 2023-08-11 PROBLEM — E83.52 HYPERCALCEMIA: Status: ACTIVE | Noted: 2023-08-11

## 2023-08-11 PROBLEM — Z98.890 POST-OPERATIVE STATE: Status: ACTIVE | Noted: 2023-08-11

## 2023-08-11 PROBLEM — R22.1 NECK MASS: Status: ACTIVE | Noted: 2023-08-11

## 2023-08-11 LAB
25(OH)D3 SERPL-MCNC: 28 NG/ML (ref 30–100)
CA-I BLD ISE-SCNC: 1.27 MMOL/L (ref 1.12–1.32)
CA-I BLD ISE-SCNC: 1.45 MMOL/L (ref 1.12–1.32)
CALCIUM SERPL-MCNC: 10.6 MG/DL (ref 8.5–10.5)
CALCIUM SERPL-MCNC: 11.2 MG/DL (ref 8.5–10.5)
EKG ATRIAL RATE: 72 BPM
EKG P AXIS: 27 DEGREES
EKG P-R INTERVAL: 176 MS
EKG Q-T INTERVAL: 406 MS
EKG QRS DURATION: 92 MS
EKG QTC CALCULATION (BAZETT): 444 MS
EKG R AXIS: -29 DEGREES
EKG T AXIS: 7 DEGREES
EKG VENTRICULAR RATE: 72 BPM
PTH-INTACT SERPL-MCNC: 157.6 PG/ML (ref 15–65)
PTH-INTACT SERPL-MCNC: 180.2 PG/ML (ref 15–65)
PTH-INTACT SERPL-MCNC: 198.4 PG/ML (ref 15–65)
PTH-INTACT SERPL-MCNC: 202.1 PG/ML (ref 15–65)

## 2023-08-11 PROCEDURE — 82306 VITAMIN D 25 HYDROXY: CPT

## 2023-08-11 PROCEDURE — 3600000003 HC SURGERY LEVEL 3 BASE: Performed by: OTOLARYNGOLOGY

## 2023-08-11 PROCEDURE — 3700000000 HC ANESTHESIA ATTENDED CARE: Performed by: OTOLARYNGOLOGY

## 2023-08-11 PROCEDURE — 7100000000 HC PACU RECOVERY - FIRST 15 MIN: Performed by: OTOLARYNGOLOGY

## 2023-08-11 PROCEDURE — 2500000003 HC RX 250 WO HCPCS: Performed by: NURSE ANESTHETIST, CERTIFIED REGISTERED

## 2023-08-11 PROCEDURE — 3700000001 HC ADD 15 MINUTES (ANESTHESIA): Performed by: OTOLARYNGOLOGY

## 2023-08-11 PROCEDURE — 6360000002 HC RX W HCPCS: Performed by: NURSE ANESTHETIST, CERTIFIED REGISTERED

## 2023-08-11 PROCEDURE — 82330 ASSAY OF CALCIUM: CPT

## 2023-08-11 PROCEDURE — 2580000003 HC RX 258: Performed by: NURSE ANESTHETIST, CERTIFIED REGISTERED

## 2023-08-11 PROCEDURE — 88305 TISSUE EXAM BY PATHOLOGIST: CPT

## 2023-08-11 PROCEDURE — 2720000010 HC SURG SUPPLY STERILE: Performed by: OTOLARYNGOLOGY

## 2023-08-11 PROCEDURE — 7100000001 HC PACU RECOVERY - ADDTL 15 MIN: Performed by: OTOLARYNGOLOGY

## 2023-08-11 PROCEDURE — 83970 ASSAY OF PARATHORMONE: CPT

## 2023-08-11 PROCEDURE — 2709999900 HC NON-CHARGEABLE SUPPLY: Performed by: OTOLARYNGOLOGY

## 2023-08-11 PROCEDURE — 0GBP0ZZ EXCISION OF LEFT INFERIOR PARATHYROID GLAND, OPEN APPROACH: ICD-10-PCS | Performed by: OTOLARYNGOLOGY

## 2023-08-11 PROCEDURE — 3600000013 HC SURGERY LEVEL 3 ADDTL 15MIN: Performed by: OTOLARYNGOLOGY

## 2023-08-11 PROCEDURE — 0GBN0ZZ EXCISION OF RIGHT INFERIOR PARATHYROID GLAND, OPEN APPROACH: ICD-10-PCS | Performed by: OTOLARYNGOLOGY

## 2023-08-11 PROCEDURE — 88331 PATH CONSLTJ SURG 1 BLK 1SPC: CPT

## 2023-08-11 PROCEDURE — 6360000002 HC RX W HCPCS: Performed by: OTOLARYNGOLOGY

## 2023-08-11 PROCEDURE — 6370000000 HC RX 637 (ALT 250 FOR IP): Performed by: OTOLARYNGOLOGY

## 2023-08-11 PROCEDURE — 2580000003 HC RX 258: Performed by: OTOLARYNGOLOGY

## 2023-08-11 PROCEDURE — 82310 ASSAY OF CALCIUM: CPT

## 2023-08-11 PROCEDURE — 2500000003 HC RX 250 WO HCPCS: Performed by: OTOLARYNGOLOGY

## 2023-08-11 PROCEDURE — 36415 COLL VENOUS BLD VENIPUNCTURE: CPT

## 2023-08-11 PROCEDURE — 2060000000 HC ICU INTERMEDIATE R&B

## 2023-08-11 PROCEDURE — 60500 EXPLORE PARATHYROID GLANDS: CPT | Performed by: OTOLARYNGOLOGY

## 2023-08-11 RX ORDER — SUCCINYLCHOLINE/SOD CL,ISO/PF 200MG/10ML
SYRINGE (ML) INTRAVENOUS PRN
Status: DISCONTINUED | OUTPATIENT
Start: 2023-08-11 | End: 2023-08-11 | Stop reason: SDUPTHER

## 2023-08-11 RX ORDER — PROPOFOL 10 MG/ML
INJECTION, EMULSION INTRAVENOUS PRN
Status: DISCONTINUED | OUTPATIENT
Start: 2023-08-11 | End: 2023-08-11 | Stop reason: SDUPTHER

## 2023-08-11 RX ORDER — SODIUM CHLORIDE 0.9 % (FLUSH) 0.9 %
5-40 SYRINGE (ML) INJECTION PRN
Status: DISCONTINUED | OUTPATIENT
Start: 2023-08-11 | End: 2023-08-11 | Stop reason: HOSPADM

## 2023-08-11 RX ORDER — FENTANYL CITRATE 50 UG/ML
INJECTION, SOLUTION INTRAMUSCULAR; INTRAVENOUS PRN
Status: DISCONTINUED | OUTPATIENT
Start: 2023-08-11 | End: 2023-08-11 | Stop reason: SDUPTHER

## 2023-08-11 RX ORDER — PRIMIDONE 50 MG/1
50 TABLET ORAL 3 TIMES DAILY
Status: DISCONTINUED | OUTPATIENT
Start: 2023-08-11 | End: 2023-08-12 | Stop reason: HOSPADM

## 2023-08-11 RX ORDER — ONDANSETRON 2 MG/ML
4 INJECTION INTRAMUSCULAR; INTRAVENOUS
Status: DISCONTINUED | OUTPATIENT
Start: 2023-08-11 | End: 2023-08-11 | Stop reason: HOSPADM

## 2023-08-11 RX ORDER — SODIUM CHLORIDE 0.9 % (FLUSH) 0.9 %
5-40 SYRINGE (ML) INJECTION EVERY 12 HOURS SCHEDULED
Status: CANCELLED | OUTPATIENT
Start: 2023-08-11

## 2023-08-11 RX ORDER — LIDOCAINE HCL/PF 100 MG/5ML
SYRINGE (ML) INJECTION PRN
Status: DISCONTINUED | OUTPATIENT
Start: 2023-08-11 | End: 2023-08-11 | Stop reason: SDUPTHER

## 2023-08-11 RX ORDER — SODIUM CHLORIDE 0.9 % (FLUSH) 0.9 %
5-40 SYRINGE (ML) INJECTION PRN
Status: CANCELLED | OUTPATIENT
Start: 2023-08-11

## 2023-08-11 RX ORDER — ALBUTEROL SULFATE 90 UG/1
2 AEROSOL, METERED RESPIRATORY (INHALATION) 4 TIMES DAILY PRN
Status: DISCONTINUED | OUTPATIENT
Start: 2023-08-11 | End: 2023-08-12 | Stop reason: HOSPADM

## 2023-08-11 RX ORDER — SODIUM CHLORIDE 9 MG/ML
INJECTION, SOLUTION INTRAVENOUS PRN
Status: DISCONTINUED | OUTPATIENT
Start: 2023-08-11 | End: 2023-08-12 | Stop reason: HOSPADM

## 2023-08-11 RX ORDER — MINERAL OIL, WHITE PETROLATUM .03; .94 G/G; G/G
OINTMENT OPHTHALMIC PRN
Status: DISCONTINUED | OUTPATIENT
Start: 2023-08-11 | End: 2023-08-11 | Stop reason: SDUPTHER

## 2023-08-11 RX ORDER — ONDANSETRON 2 MG/ML
4 INJECTION INTRAMUSCULAR; INTRAVENOUS EVERY 6 HOURS PRN
Status: DISCONTINUED | OUTPATIENT
Start: 2023-08-11 | End: 2023-08-12 | Stop reason: HOSPADM

## 2023-08-11 RX ORDER — HYDROMORPHONE HYDROCHLORIDE 2 MG/1
1 TABLET ORAL EVERY 4 HOURS PRN
Status: DISCONTINUED | OUTPATIENT
Start: 2023-08-11 | End: 2023-08-12 | Stop reason: HOSPADM

## 2023-08-11 RX ORDER — LABETALOL HYDROCHLORIDE 5 MG/ML
10 INJECTION, SOLUTION INTRAVENOUS
Status: DISCONTINUED | OUTPATIENT
Start: 2023-08-11 | End: 2023-08-11 | Stop reason: HOSPADM

## 2023-08-11 RX ORDER — SODIUM CHLORIDE 0.9 % (FLUSH) 0.9 %
5-40 SYRINGE (ML) INJECTION PRN
Status: DISCONTINUED | OUTPATIENT
Start: 2023-08-11 | End: 2023-08-12 | Stop reason: HOSPADM

## 2023-08-11 RX ORDER — SODIUM CHLORIDE 0.9 % (FLUSH) 0.9 %
5-40 SYRINGE (ML) INJECTION EVERY 12 HOURS SCHEDULED
Status: DISCONTINUED | OUTPATIENT
Start: 2023-08-11 | End: 2023-08-11 | Stop reason: HOSPADM

## 2023-08-11 RX ORDER — HYDRALAZINE HYDROCHLORIDE 20 MG/ML
10 INJECTION INTRAMUSCULAR; INTRAVENOUS
Status: DISCONTINUED | OUTPATIENT
Start: 2023-08-11 | End: 2023-08-11 | Stop reason: HOSPADM

## 2023-08-11 RX ORDER — ONDANSETRON 2 MG/ML
INJECTION INTRAMUSCULAR; INTRAVENOUS PRN
Status: DISCONTINUED | OUTPATIENT
Start: 2023-08-11 | End: 2023-08-11 | Stop reason: SDUPTHER

## 2023-08-11 RX ORDER — SODIUM CHLORIDE 0.9 % (FLUSH) 0.9 %
5-40 SYRINGE (ML) INJECTION EVERY 12 HOURS SCHEDULED
Status: DISCONTINUED | OUTPATIENT
Start: 2023-08-11 | End: 2023-08-12 | Stop reason: HOSPADM

## 2023-08-11 RX ORDER — OXYBUTYNIN CHLORIDE 5 MG/1
5 TABLET ORAL DAILY
Status: DISCONTINUED | OUTPATIENT
Start: 2023-08-11 | End: 2023-08-12 | Stop reason: HOSPADM

## 2023-08-11 RX ORDER — SODIUM CHLORIDE 9 MG/ML
INJECTION, SOLUTION INTRAVENOUS CONTINUOUS PRN
Status: DISCONTINUED | OUTPATIENT
Start: 2023-08-11 | End: 2023-08-11 | Stop reason: SDUPTHER

## 2023-08-11 RX ORDER — SODIUM CHLORIDE 9 MG/ML
INJECTION, SOLUTION INTRAVENOUS PRN
Status: DISCONTINUED | OUTPATIENT
Start: 2023-08-11 | End: 2023-08-11 | Stop reason: HOSPADM

## 2023-08-11 RX ORDER — ONDANSETRON 4 MG/1
4 TABLET, ORALLY DISINTEGRATING ORAL EVERY 8 HOURS PRN
Status: DISCONTINUED | OUTPATIENT
Start: 2023-08-11 | End: 2023-08-12 | Stop reason: HOSPADM

## 2023-08-11 RX ORDER — HYDROMORPHONE HYDROCHLORIDE 2 MG/1
2 TABLET ORAL EVERY 4 HOURS PRN
Status: DISCONTINUED | OUTPATIENT
Start: 2023-08-11 | End: 2023-08-12 | Stop reason: HOSPADM

## 2023-08-11 RX ORDER — SODIUM CHLORIDE 9 MG/ML
INJECTION, SOLUTION INTRAVENOUS CONTINUOUS
Status: DISCONTINUED | OUTPATIENT
Start: 2023-08-11 | End: 2023-08-12 | Stop reason: HOSPADM

## 2023-08-11 RX ORDER — HYDROMORPHONE HYDROCHLORIDE 2 MG/ML
INJECTION, SOLUTION INTRAMUSCULAR; INTRAVENOUS; SUBCUTANEOUS PRN
Status: DISCONTINUED | OUTPATIENT
Start: 2023-08-11 | End: 2023-08-11 | Stop reason: SDUPTHER

## 2023-08-11 RX ORDER — SODIUM CHLORIDE 9 MG/ML
INJECTION, SOLUTION INTRAVENOUS PRN
Status: CANCELLED | OUTPATIENT
Start: 2023-08-11

## 2023-08-11 RX ADMIN — SODIUM CHLORIDE: 9 INJECTION, SOLUTION INTRAVENOUS at 15:21

## 2023-08-11 RX ADMIN — ONDANSETRON 4 MG: 2 INJECTION INTRAMUSCULAR; INTRAVENOUS at 22:00

## 2023-08-11 RX ADMIN — MINERAL OIL, WHITE PETROLATUM 3500 MG: .03; .94 OINTMENT OPHTHALMIC at 15:31

## 2023-08-11 RX ADMIN — PROPOFOL 170 MG: 10 INJECTION, EMULSION INTRAVENOUS at 15:26

## 2023-08-11 RX ADMIN — ONDANSETRON 4 MG: 2 INJECTION INTRAMUSCULAR; INTRAVENOUS at 15:37

## 2023-08-11 RX ADMIN — Medication 100 MG: at 15:26

## 2023-08-11 RX ADMIN — HYDROMORPHONE HYDROCHLORIDE 1 MG: 2 TABLET ORAL at 22:06

## 2023-08-11 RX ADMIN — HYDROMORPHONE HYDROCHLORIDE 0.5 MG: 2 INJECTION INTRAMUSCULAR; INTRAVENOUS; SUBCUTANEOUS at 15:40

## 2023-08-11 RX ADMIN — HYDROMORPHONE HYDROCHLORIDE 0.5 MG: 2 INJECTION INTRAMUSCULAR; INTRAVENOUS; SUBCUTANEOUS at 15:52

## 2023-08-11 RX ADMIN — HYDROMORPHONE HYDROCHLORIDE 0.5 MG: 2 INJECTION INTRAMUSCULAR; INTRAVENOUS; SUBCUTANEOUS at 18:33

## 2023-08-11 RX ADMIN — SODIUM CHLORIDE: 9 INJECTION, SOLUTION INTRAVENOUS at 21:03

## 2023-08-11 RX ADMIN — FENTANYL CITRATE 50 MCG: 50 INJECTION, SOLUTION INTRAMUSCULAR; INTRAVENOUS at 15:35

## 2023-08-11 RX ADMIN — Medication 140 MG: at 15:26

## 2023-08-11 RX ADMIN — FENTANYL CITRATE 50 MCG: 50 INJECTION, SOLUTION INTRAMUSCULAR; INTRAVENOUS at 15:26

## 2023-08-11 ASSESSMENT — PAIN - FUNCTIONAL ASSESSMENT
PAIN_FUNCTIONAL_ASSESSMENT: ACTIVITIES ARE NOT PREVENTED

## 2023-08-11 ASSESSMENT — PAIN DESCRIPTION - ORIENTATION
ORIENTATION: UPPER
ORIENTATION: MID
ORIENTATION: MID

## 2023-08-11 ASSESSMENT — PAIN DESCRIPTION - ONSET
ONSET: ON-GOING
ONSET: ON-GOING

## 2023-08-11 ASSESSMENT — PAIN SCALES - GENERAL
PAINLEVEL_OUTOF10: 9
PAINLEVEL_OUTOF10: 0
PAINLEVEL_OUTOF10: 7
PAINLEVEL_OUTOF10: 4
PAINLEVEL_OUTOF10: 8

## 2023-08-11 ASSESSMENT — PAIN DESCRIPTION - DESCRIPTORS
DESCRIPTORS: ACHING

## 2023-08-11 ASSESSMENT — ENCOUNTER SYMPTOMS: SHORTNESS OF BREATH: 1

## 2023-08-11 ASSESSMENT — PAIN DESCRIPTION - FREQUENCY
FREQUENCY: CONTINUOUS
FREQUENCY: CONTINUOUS

## 2023-08-11 ASSESSMENT — PAIN DESCRIPTION - LOCATION
LOCATION: NECK

## 2023-08-11 ASSESSMENT — PAIN DESCRIPTION - PAIN TYPE
TYPE: SURGICAL PAIN
TYPE: SURGICAL PAIN

## 2023-08-11 NOTE — PROGRESS NOTES
1915 Arouses to name on arrival to PACU , O2 3l NC applied , HOB elevated   1922 awake and oriented denies any pain and drifts back to sleep    1945 awakens easily to name continues to deny pain

## 2023-08-11 NOTE — INTERVAL H&P NOTE
Pt Name: Smitha Swan  MRN: [id number]  YOB: 1945  Date of evaluation: 8/11/2023    I have examined the patient and reviewed the H&P/Consult and there are no changes to the patient or plans. This morning's labs were as expected except for the low vitamin D level. She will need extra vitamin D3 supplementation postop.  Observation status overnight is planned while we watch her calcium levels        Electronically signed by Beth Gramajo MD on 8/11/2023 at 2:34 PM

## 2023-08-11 NOTE — PROGRESS NOTES
Admitted to Same Day Surgery and oriented to the unit. Patient verbalized approval for first name, last initial and physician name on the unit white board. Fall and allergy band on patient.

## 2023-08-11 NOTE — FLOWSHEET NOTE
RN called OR room 8 to notify Dr. Jennifer Saini of patients lab joshua. No new orders at this time.

## 2023-08-12 VITALS
BODY MASS INDEX: 44.56 KG/M2 | DIASTOLIC BLOOD PRESSURE: 74 MMHG | HEIGHT: 64 IN | OXYGEN SATURATION: 100 % | WEIGHT: 261 LBS | TEMPERATURE: 97.8 F | HEART RATE: 56 BPM | RESPIRATION RATE: 16 BRPM | SYSTOLIC BLOOD PRESSURE: 157 MMHG

## 2023-08-12 LAB
CA-I BLD ISE-SCNC: 1.28 MMOL/L (ref 1.12–1.32)
CALCIUM SERPL-MCNC: 10.4 MG/DL (ref 8.5–10.5)
PTH-INTACT SERPL-MCNC: 210.2 PG/ML (ref 15–65)
PTH-INTACT SERPL-MCNC: 216.9 PG/ML (ref 15–65)

## 2023-08-12 PROCEDURE — 82330 ASSAY OF CALCIUM: CPT

## 2023-08-12 PROCEDURE — 83970 ASSAY OF PARATHORMONE: CPT

## 2023-08-12 PROCEDURE — 2580000003 HC RX 258: Performed by: OTOLARYNGOLOGY

## 2023-08-12 PROCEDURE — 6370000000 HC RX 637 (ALT 250 FOR IP): Performed by: OTOLARYNGOLOGY

## 2023-08-12 PROCEDURE — 82310 ASSAY OF CALCIUM: CPT

## 2023-08-12 PROCEDURE — 36415 COLL VENOUS BLD VENIPUNCTURE: CPT

## 2023-08-12 RX ORDER — ACETAMINOPHEN 325 MG/1
650 TABLET ORAL EVERY 4 HOURS PRN
Status: DISCONTINUED | OUTPATIENT
Start: 2023-08-12 | End: 2023-08-12 | Stop reason: HOSPADM

## 2023-08-12 RX ORDER — METOPROLOL SUCCINATE 25 MG/1
25 TABLET, EXTENDED RELEASE ORAL EVERY 12 HOURS
Status: DISCONTINUED | OUTPATIENT
Start: 2023-08-12 | End: 2023-08-12 | Stop reason: HOSPADM

## 2023-08-12 RX ORDER — METOPROLOL SUCCINATE 25 MG/1
25 TABLET, EXTENDED RELEASE ORAL EVERY 12 HOURS
Qty: 10 TABLET | Refills: 0 | Status: SHIPPED | OUTPATIENT
Start: 2023-08-12 | End: 2023-08-17

## 2023-08-12 RX ADMIN — ACETAMINOPHEN 650 MG: 325 TABLET ORAL at 09:06

## 2023-08-12 RX ADMIN — SODIUM CHLORIDE: 9 INJECTION, SOLUTION INTRAVENOUS at 07:14

## 2023-08-12 RX ADMIN — OXYBUTYNIN CHLORIDE 5 MG: 5 TABLET ORAL at 08:11

## 2023-08-12 RX ADMIN — PRIMIDONE 50 MG: 50 TABLET ORAL at 13:57

## 2023-08-12 RX ADMIN — METOPROLOL SUCCINATE 25 MG: 25 TABLET, EXTENDED RELEASE ORAL at 09:06

## 2023-08-12 RX ADMIN — PRIMIDONE 50 MG: 50 TABLET ORAL at 08:11

## 2023-08-12 ASSESSMENT — PAIN DESCRIPTION - LOCATION
LOCATION: HEAD
LOCATION: NECK

## 2023-08-12 ASSESSMENT — PAIN DESCRIPTION - ORIENTATION
ORIENTATION: MID
ORIENTATION: INNER

## 2023-08-12 ASSESSMENT — PAIN DESCRIPTION - DESCRIPTORS
DESCRIPTORS: ACHING
DESCRIPTORS: ACHING

## 2023-08-12 ASSESSMENT — PAIN SCALES - GENERAL
PAINLEVEL_OUTOF10: 4
PAINLEVEL_OUTOF10: 2
PAINLEVEL_OUTOF10: 7

## 2023-08-12 ASSESSMENT — PAIN - FUNCTIONAL ASSESSMENT: PAIN_FUNCTIONAL_ASSESSMENT: ACTIVITIES ARE NOT PREVENTED

## 2023-08-12 ASSESSMENT — PAIN DESCRIPTION - PAIN TYPE: TYPE: SURGICAL PAIN

## 2023-08-12 ASSESSMENT — PAIN DESCRIPTION - ONSET: ONSET: ON-GOING

## 2023-08-12 ASSESSMENT — PAIN DESCRIPTION - FREQUENCY: FREQUENCY: CONTINUOUS

## 2023-08-12 NOTE — PROGRESS NOTES
Patient admitted to [facility] Room 16 from surgery  Nauseated s/p souleymane. paathyroidectomy    IV site free of s/s of infection or infiltration. Vital signs obtained. Assessment and data collection initiated. Oriented to room. Policies and procedures for 4K explained All questions answered with no further questions at this time. Fall prevention and safety brochure discussed with patient. Skin check completed by this RN     Patient declines PCP notification. Patient declines family notification.

## 2023-08-12 NOTE — PROGRESS NOTES
Low-dose metoprolol prescribed until her office visit with me in 3 days because of hypertension during her admission.

## 2023-08-12 NOTE — CARE COORDINATION
08/12/23 1119   Service Assessment   Patient Orientation Alert and Oriented   Cognition Alert   History Provided By Patient   Primary Caregiver Self   Accompanied By/Relationship Spouse and granddaughtre   Support Systems Spouse/Significant Other   Patient's Healthcare Decision Maker is: Patient Declined (Legal Next of Kin Remains as Decision Maker)   PCP Verified by CM Yes   Last Visit to PCP Within last 6 months   Prior Functional Level Independent in ADLs/IADLs   Current Functional Level Independent in ADLs/IADLs   Can patient return to prior living arrangement Yes   Ability to make needs known: Good   Family able to assist with home care needs: Yes   Would you like for me to discuss the discharge plan with any other family members/significant others, and if so, who? No   Financial Resources Medicare; (VA)   Community Resources None   Discharge Planning   Type of Residence House   Living Arrangements Spouse/Significant Other   Current Services Prior To Admission Other (Comment)  (HAs DME at home cane, walker, BP monitor,)   Potential Assistance Needed N/A   DME Ordered? No   Potential Assistance Purchasing Medications No   Type of Home Care Services None   Patient expects to be discharged to: House   Follow Up Appointment: Best Day/Time  Tuesday AM   Services At/After Discharge   Transition of Care Consult (CM Consult) N/A   Services At/After Discharge None   Mode of Transport at Discharge Self   Confirm Follow Up Transport Family     Patient Goals/Plan/Treatment Preferences: Plan return home with spouse. Declines needs. Verified PCP and insurance. Will continue to follow. If patient is discharged prior to next notation, then this note serves as note for discharge by case management.

## 2023-08-12 NOTE — BRIEF OP NOTE
Brief Postoperative Note      Patient: Cindia Heimlich  YOB: 1945  MRN: [id number]    Date of Procedure: 8/11/2023    Pre-Op Diagnosis Codes:     * Primary hyperparathyroidism ([address]) [E21.0]     * HYPERCALCEMIA     * NEPHROLITHIASIS    Post-Op Diagnosis: Same       Procedure(s):  BILATERAL PARATHYROIDECTOMY    Surgeon(s):  Tuan Carrasco MD    Assistant:  * No surgical staff found *    Anesthesia: General    Estimated Blood Loss (mL): Minimal    Complications: None    Specimens:   ID Type Source Tests Collected by Time Destination   B : Right parathyroid adenoma (questionable)  Tissue Neck SURGICAL PATHOLOGY Tuan Carrasco MD 8/11/2023 1643    C : Tissue from transesophageal groove inferior to prior specimen  Tissue Neck SURGICAL PATHOLOGY Tuan Carrasco MD 8/11/2023 1714    D :  Part # 2 of Right parathyroid adenoma (questionable)  Tissue Neck SURGICAL PATHOLOGY Tuan Carrasco MD 8/11/2023 1716    E : left inferior parathyroid  Tissue Neck SURGICAL PATHOLOGY Tuan Carrasco MD 8/11/2023 1802        Implants:  * No implants in log *      Drains:   [REMOVED] Urinary Catheter 08/11/23 Johnson-Temperature (Removed)       Findings: Preoperatively there were extensive attempts to localize the adenoma causing her primary hyperparathyroidism. Only 2 candidates showed up on the 4D CT, of the right lower 1 being much more likely as it retained contrast but not for very long. It washed out at the same time the thyroid it. The soft tissue mass on the left side did not  as much contrast.  The upper parathyroids did not show up. Sestamibi scan did not reveal any adenoma. Neither of the 2 inferior parathyroids because the PTH dropped sufficiently to assume the adenoma had been resected. The location of the adenoma was completely unknown and I stopped. I consulted with Dr. Anthony Hope and he agreed that stopping was the right decision.   We need more sophisticated techniques such as venous sampling or other

## 2023-08-12 NOTE — PROGRESS NOTES
RN went over all discharge instructions with patient and patient's spouse. RN answered all questions with no further questions at this time. Patient discharged with all personal belongings, discharge instructions, and was informed to  prescription medications at pharmacy. RN informed patient on making follow-up appointments with providers. Patient acknowledged RN. Patient discharged home  with spouse. RN call Dr. Desirae Caballero in regards to patient stating Dr. Desirae Caballero would discharge patient on BP medication. RN informed patient to call this RN at [phone] if medication for blood pressure isn't sent to pharmacy.

## 2023-08-12 NOTE — OP NOTE
Operative Note      Patient: Smitha Swan  YOB: 1945  MRN: [id number]    Date of Procedure: 8/11/2023  Pre-Op Diagnosis Codes:     * Primary hyperparathyroidism ([address]) [E21.0]     * HYPERCALCEMIA     * NEPHROLITHIASIS     Post-Op Diagnosis: Same       Procedure(s):  BILATERAL PARATHYROIDECTOMY, third-party NIM monitoring of recurrent laryngeal nerves     Surgeon(s):  Kelly Cordero MD     Assistant:  * No surgical staff found *     Anesthesia: General     Estimated Blood Loss (mL): Minimal     Complications: None     Specimens:   ID Type Source Tests Collected by Time Destination   B : Right parathyroid adenoma (questionable)  Tissue Neck SURGICAL PATHOLOGY Kelly Cordero MD 8/11/2023 1643     C : Tissue from transesophageal groove inferior to prior specimen  Tissue Neck SURGICAL PATHOLOGY Kelly Cordero MD 8/11/2023 1714     D :  Part # 2 of Right parathyroid adenoma (questionable)  Tissue Neck SURGICAL PATHOLOGY Kelly Cordero MD 8/11/2023 1716     E : left inferior parathyroid  Tissue Neck SURGICAL PATHOLOGY Kelly Cordero MD 8/11/2023 1802           Implants:  * No implants in log *      Drains:   [REMOVED] Urinary Catheter 08/11/23 Johnson-Temperature (Removed)         Findings: Preoperatively there were extensive attempts to localize the adenoma that was causing her primary hyperparathyroidism. Only 2 candidates showed up on the 4D CT, the right inferior 1 being much more likely than the left, as it retained contrast, but not for very long. It washed out at the same time the thyroid did. The soft tissue mass on the left side did not  as much contrast.  The superior parathyroids did not show up. Sestamibi scan did not reveal any adenoma. We will of neither of the 2 inferior parathyroids caused the PTH to drop sufficiently to assume the adenoma had been resected. The location of the adenoma was completely unknown and I stopped.   I consulted with Dr. Tabatha Hadley and he agreed that

## 2023-08-12 NOTE — DISCHARGE INSTRUCTIONS
Post-Operative Instructions  Parathyroidectomy  Diet   Patients who have received general anesthesia may experience nausea and occasionally, vomiting. It is therefore preferable to eat a bland light meal or a liquid diet on the first day after the surgery. Regular diet may be resumed the next day. Also, pain pills may cause nausea if taken on an empty stomach. It would be better to take those pills with a piece of toast or some food. Activity and Wound Care   Elevate the head as much as possible. Head elevation reduces bruising and swelling. Occasionally, you may notice that the bruises or swelling have migrated to lower regions of the body. Avoid straining and heavy lifting, but do not stay in bed. Walk or move your legs as much as possible, to prevent blood clots and gradually resume your normal activity. You may have a dressing or your wound may be exposed. Your wound is sealed with glue and sutures. Keep it dry until return visit in 3 days. Medications   You may resume your home medications except for the Celebrex. Do not restart that for approximately 3 days. Follow-up   Your post operative appointment is this Tuesday. 9:50 AM   Dr. Delacruz Quiet suggested a 1 month appointment for him. I suspect that we will get changed, and will be based on our discussion this Tuesday. Call for temp greater than 101.5F, increasing pain with redness of the incision, or swelling at the surgery site.

## 2023-08-12 NOTE — ANESTHESIA POSTPROCEDURE EVALUATION
Department of Anesthesiology  Postprocedure Note    Patient: Frankey Rands  MRN: [id number]  YOB: 1945  Date of evaluation: 8/11/2023      Procedure Summary     Date: 08/11/23 Room / Location: [facility] 08 / Loris Fabry    Anesthesia Start: 1521 Anesthesia Stop: 1918    Procedure: [address] (Bilateral: Neck) Diagnosis:       Primary hyperparathyroidism ([address])      (Primary hyperparathyroidism ([address]) [E21.0])    Surgeons: Jun Flores MD Responsible Provider: Lina Logan DO    Anesthesia Type: general ASA Status: 3          Anesthesia Type: No value filed.     Julieta Phase I: Julieta Score: 9    Julieta Phase II:        Anesthesia Post Evaluation    Patient location during evaluation: PACU  Patient participation: complete - patient participated  Level of consciousness: awake  Airway patency: patent  Nausea & Vomiting: no nausea  Complications: no  Cardiovascular status: hemodynamically stable  Respiratory status: acceptable  Hydration status: stable  Pain management: adequate

## 2023-08-12 NOTE — PLAN OF CARE
Problem: Discharge Planning  Goal: Discharge to home or other facility with appropriate resources  Outcome: Progressing  Flowsheets (Taken 8/11/2023 2131)  Discharge to home or other facility with appropriate resources: Identify barriers to discharge with patient and caregiver     Problem: Pain  Goal: Verbalizes/displays adequate comfort level or baseline comfort level  Outcome: Progressing  Flowsheets (Taken 8/11/2023 2131)  Verbalizes/displays adequate comfort level or baseline comfort level:   Encourage patient to monitor pain and request assistance   Administer analgesics based on type and severity of pain and evaluate response   Assess pain using appropriate pain scale   Implement non-pharmacological measures as appropriate and evaluate response     Problem: Skin/Tissue Integrity  Goal: Absence of new skin breakdown  Description: 1. Monitor for areas of redness and/or skin breakdown  2. Assess vascular access sites hourly  3. Every 4-6 hours minimum:  Change oxygen saturation probe site  4. Every 4-6 hours:  If on nasal continuous positive airway pressure, respiratory therapy assess nares and determine need for appliance change or resting period. Outcome: Progressing  Note: No signs of skin breakdown. Skin warm, dry, and intact. Mucous membranes pink and moist.  Assistance with turns/ambulation provided PRN. Will continue to monitor.        Problem: Neurosensory - Adult  Goal: Achieves maximal functionality and self care  Outcome: Progressing  Flowsheets (Taken 8/11/2023 2131)  Achieves maximal functionality and self care:   Monitor swallowing and airway patency with patient fatigue and changes in neurological status   Encourage and assist patient to increase activity and self care with guidance from physical therapy/occupational therapy     Problem: Respiratory - Adult  Goal: Achieves optimal ventilation and oxygenation  Outcome: Progressing  Flowsheets (Taken 8/11/2023 2131)  Achieves optimal ventilation and oxygenation:   Assess for changes in respiratory status   Encourage broncho-pulmonary hygiene including cough, deep breathe, incentive spirometry     Problem: Metabolic/Fluid and Electrolytes - Adult  Goal: Electrolytes maintained within normal limits  Outcome: Progressing  Flowsheets (Taken 8/11/2023 2131)  Electrolytes maintained within normal limits: Monitor labs and assess patient for signs and symptoms of electrolyte imbalances

## 2023-08-14 ENCOUNTER — CARE COORDINATION (OUTPATIENT)
Dept: CASE MANAGEMENT | Age: 78
End: 2023-08-14

## 2023-08-14 DIAGNOSIS — E89.2 S/P PARATHYROIDECTOMY (HCC): ICD-10-CM

## 2023-08-14 DIAGNOSIS — E21.0 HYPERPARATHYROIDISM, PRIMARY (HCC): Primary | ICD-10-CM

## 2023-08-14 PROCEDURE — 1111F DSCHRG MED/CURRENT MED MERGE: CPT | Performed by: FAMILY MEDICINE

## 2023-08-14 NOTE — CARE COORDINATION
an opportunity to ask questions and does not have any further questions or concerns at this time. Were discharge instructions available to patient? Yes. Reviewed appropriate site of care based on symptoms and resources available to patient including: PCP  Specialist  Urgent care clinics  When to call Nathaniel Chatman. The patient agrees to contact the PCP office for questions related to their healthcare. Advance Care Planning:   Does patient have an Advance Directive: referral to internal ACP facilitator. Medication reconciliation was performed with patient, who verbalizes understanding of administration of home medications. Medications reviewed, 1111F entered: yes    Was patient discharged with a pulse oximeter? no    Non-face-to-face services provided:  Scheduled appointment with PCP-8/18  Scheduled appointment with Specialist-8/15  Obtained and reviewed discharge summary and/or continuity of care documents    Offered patient enrollment in the Remote Patient Monitoring (RPM) program for in-home monitoring: Patient is not eligible for RPM program.    Care Transitions 24 Hour Call    Schedule Follow Up Appointment with PCP: Completed  Do you have a copy of your discharge instructions?: Yes  Do you have all of your prescriptions and are they filled?: Yes  Have you been contacted by a [facility] Pharmacist?: No  Have you scheduled your follow up appointment?: Yes  How are you going to get to your appointment?: Car - family or friend to transport  Do you feel like you have everything you need to keep you well at home?: Yes  Care Transitions Interventions     Transportation Support: Declined            Discussed follow-up appointments. If no appointment was previously scheduled, appointment scheduling offered: Yes. Is follow up appointment scheduled within 7 days of discharge? Yes.     Follow Up  Future Appointments   Date Time Provider [address]   8/15/2023  9:50 AM Dre Alvarado MD N ENT [facility]

## 2023-08-15 ENCOUNTER — OFFICE VISIT (OUTPATIENT)
Dept: ENT CLINIC | Age: 78
End: 2023-08-15

## 2023-08-15 VITALS
OXYGEN SATURATION: 99 % | HEIGHT: 64 IN | BODY MASS INDEX: 43.77 KG/M2 | HEART RATE: 62 BPM | DIASTOLIC BLOOD PRESSURE: 68 MMHG | SYSTOLIC BLOOD PRESSURE: 120 MMHG | TEMPERATURE: 98.3 F | WEIGHT: 256.4 LBS | RESPIRATION RATE: 20 BRPM

## 2023-08-15 DIAGNOSIS — E21.0 PRIMARY HYPERPARATHYROIDISM (HCC): Primary | ICD-10-CM

## 2023-08-15 DIAGNOSIS — E83.52 HYPERCALCEMIA: ICD-10-CM

## 2023-08-15 ASSESSMENT — ENCOUNTER SYMPTOMS
SORE THROAT: 0
ABDOMINAL PAIN: 0
STRIDOR: 0
APNEA: 0
SINUS PRESSURE: 0
COLOR CHANGE: 0
SHORTNESS OF BREATH: 0
DIARRHEA: 0
CHEST TIGHTNESS: 0
NAUSEA: 0
COUGH: 0
VOICE CHANGE: 0
RHINORRHEA: 0
CHOKING: 0
VOMITING: 0
FACIAL SWELLING: 0
WHEEZING: 0
TROUBLE SWALLOWING: 0

## 2023-08-15 NOTE — PROGRESS NOTES
[facility] PHYSICIANS [place] [facility]  [facility] EAR, NOSE AND THROAT  [address]  Dept: [phone]  Dept Fax: [phone]  Loc: [phone]    Javier Lora is a 78 y.o. female who was referred by No ref. provider found for:  Chief Complaint   Patient presents with    Post-Op Check     Patient is here post op 08/11 parathyroid. Patient said she is doing well. Patient did not need a drain. Patient states that the soreness is very minimal.    . HPI:     Javier Lora is a 78 y.o. female who presents today for follow-up on her surgery last week. Apparently neither of the masses identified on the 40 CT were the parathyroid adenoma. One of them once his thyroid tissue was suggested by Dr. Faye Arndt. Her PTH on discharge was approximately the same as is his admission. She has no complaints regarding her neck incision. History: Allergies   Allergen Reactions    Latex Rash     Current Outpatient Medications   Medication Sig Dispense Refill    zinc 50 MG TABS tablet Take 1 tablet by mouth daily      primidone (MYSOLINE) 50 MG tablet Take 1 tablet by mouth 3 times daily 270 tablet 3    oxybutynin (DITROPAN) 5 MG tablet Take 1 tablet by mouth daily      Handicap Placard MISC by Does not apply route Expiration in 5 years 1 each 0    vitamin B-12 (CYANOCOBALAMIN) 1000 MCG tablet Take 1 tablet by mouth daily      albuterol sulfate HFA (VENTOLIN HFA) 108 (90 Base) MCG/ACT inhaler Inhale 2 puffs into the lungs 4 times daily as needed for Wheezing 18 g 1    celecoxib (CELEBREX) 200 MG capsule Take 1 capsule by mouth daily (Patient taking differently: Take 1 capsule by mouth every other day Indications: Hold for 3 days postop) 90 capsule 3     No current facility-administered medications for this visit.      Past Medical History:   Diagnosis Date    Arthritis     knees bilat    COVID-19 2020    scar tissue from this in both lungs per pt    Kidney stone

## 2023-08-18 ENCOUNTER — CARE COORDINATION (OUTPATIENT)
Dept: CARE COORDINATION | Age: 78
End: 2023-08-18

## 2023-08-18 ENCOUNTER — OFFICE VISIT (OUTPATIENT)
Dept: FAMILY MEDICINE CLINIC | Age: 78
End: 2023-08-18

## 2023-08-18 VITALS
DIASTOLIC BLOOD PRESSURE: 88 MMHG | SYSTOLIC BLOOD PRESSURE: 148 MMHG | BODY MASS INDEX: 43.71 KG/M2 | HEIGHT: 64 IN | OXYGEN SATURATION: 96 % | WEIGHT: 256 LBS | HEART RATE: 73 BPM | TEMPERATURE: 98.2 F

## 2023-08-18 DIAGNOSIS — R07.89 CHEST WALL PAIN: ICD-10-CM

## 2023-08-18 DIAGNOSIS — I77.9 CAROTID ARTERY DISORDER (HCC): ICD-10-CM

## 2023-08-18 DIAGNOSIS — Z09 HOSPITAL DISCHARGE FOLLOW-UP: Primary | ICD-10-CM

## 2023-08-18 DIAGNOSIS — E21.0 PRIMARY HYPERPARATHYROIDISM (HCC): ICD-10-CM

## 2023-08-18 DIAGNOSIS — R03.0 ELEVATED BLOOD PRESSURE READING: ICD-10-CM

## 2023-08-21 ENCOUNTER — CARE COORDINATION (OUTPATIENT)
Dept: CASE MANAGEMENT | Age: 78
End: 2023-08-21

## 2023-08-21 NOTE — CARE COORDINATION
[facility] Care Transitions Follow Up Call    Patient Current Location:  Home: [address] [place] [address],[address] [facility] Transition Nurse contacted the patient by telephone to follow up after admission on 23. Verified name and  with patient as identifiers. Patient: Zainab Cedeno  Patient : 1945   MRN: [id number]  Reason for Admission: Primary hyperparathyroidism s/p bilateral parathyroidectomy   Discharge Date: 23 RARS: Readmission Risk Score: 8.3      Needs to be reviewed by the provider   Additional needs identified to be addressed with provider: No  none             Method of communication with provider: none. Spoke with Chauncey Dias, said she is doing pretty good. Denies fever, pain or issues from surgery. Saw ENT and PCP last week. Never took the Toprol XL since BP was normal at home,  PCP said she did the right thing. Has restarted Celebrex. ENT is having her do some more testing this Wed and on the order it says Covid infusion 120 minutes. PCP didn't know what that meant either. She did call the vascular office and was told she would not be getting a Covid infusion. She will say something again when she goes to appt. Denies any other needs. No other questions or concerns at this time. Will continue to follow. Addressed changes since last contact:  none  Discussed follow-up appointments. If no appointment was previously scheduled, appointment scheduling offered: Yes. Is follow up appointment scheduled within 7 days of discharge? Yes.     Follow Up  Future Appointments   Date Time Provider [address]   2023  6:30 AM STR SPECIAL PROCEDURE ROOM 1 STRZ SPEC STR Radiolog   2023  1:00 PM Deisy Clayton MD SRPX [facility] [place]   10/23/2023 11:45 AM Tremaine Day MD AFL APEX AFL APEX END   1/15/2024  9:30 AM Cali Colon, [address] [place] [address] follow up appointment(s): eugenia    Care Transition Nurse reviewed medical action plan and

## 2023-08-23 ENCOUNTER — HOSPITAL ENCOUNTER (OUTPATIENT)
Dept: INTERVENTIONAL RADIOLOGY/VASCULAR | Age: 78
Discharge: HOME | End: 2023-08-23
Payer: MEDICARE

## 2023-08-23 VITALS
OXYGEN SATURATION: 97 % | BODY MASS INDEX: 43.26 KG/M2 | WEIGHT: 252 LBS | SYSTOLIC BLOOD PRESSURE: 165 MMHG | RESPIRATION RATE: 18 BRPM | HEART RATE: 59 BPM | TEMPERATURE: 97.5 F | DIASTOLIC BLOOD PRESSURE: 84 MMHG

## 2023-08-23 DIAGNOSIS — E21.0 PRIMARY HYPERPARATHYROIDISM (HCC): ICD-10-CM

## 2023-08-23 LAB
DEPRECATED RDW RBC AUTO: 46.5 FL (ref 35–45)
ERYTHROCYTE [DISTWIDTH] IN BLOOD BY AUTOMATED COUNT: 13 % (ref 11.5–14.5)
HCT VFR BLD AUTO: 38.6 % (ref 37–47)
HGB BLD-MCNC: 12.6 GM/DL (ref 12–16)
MCH RBC QN AUTO: 32.1 PG (ref 26–33)
MCHC RBC AUTO-ENTMCNC: 32.6 GM/DL (ref 32.2–35.5)
MCV RBC AUTO: 98.2 FL (ref 81–99)
PLATELET # BLD AUTO: 202 THOU/MM3 (ref 130–400)
PMV BLD AUTO: 10.4 FL (ref 9.4–12.4)
PTH-INTACT SERPL-MCNC: 178.5 PG/ML (ref 15–65)
PTH-INTACT SERPL-MCNC: 193.8 PG/ML (ref 15–65)
PTH-INTACT SERPL-MCNC: 260.9 PG/ML (ref 15–65)
PTH-INTACT SERPL-MCNC: 299.4 PG/ML (ref 15–65)
PTH-INTACT SERPL-MCNC: 383.8 PG/ML (ref 15–65)
PTH-INTACT SERPL-MCNC: 522.6 PG/ML (ref 15–65)
PTH-INTACT SERPL-MCNC: 572.8 PG/ML (ref 15–65)
RBC # BLD AUTO: 3.93 MILL/MM3 (ref 4.2–5.4)
WBC # BLD AUTO: 5.9 THOU/MM3 (ref 4.8–10.8)

## 2023-08-23 PROCEDURE — 36500 INSERTION OF CATHETER VEIN: CPT

## 2023-08-23 PROCEDURE — 83970 ASSAY OF PARATHORMONE: CPT

## 2023-08-23 PROCEDURE — 75893 VENOUS SAMPLING BY CATHETER: CPT

## 2023-08-23 PROCEDURE — 6360000002 HC RX W HCPCS: Performed by: OTOLARYNGOLOGY

## 2023-08-23 PROCEDURE — C1769 GUIDE WIRE: HCPCS

## 2023-08-23 PROCEDURE — 2580000003 HC RX 258: Performed by: RADIOLOGY

## 2023-08-23 PROCEDURE — 2580000003 HC RX 258: Performed by: OTOLARYNGOLOGY

## 2023-08-23 PROCEDURE — 6360000004 HC RX CONTRAST MEDICATION: Performed by: RADIOLOGY

## 2023-08-23 PROCEDURE — 85027 COMPLETE CBC AUTOMATED: CPT

## 2023-08-23 RX ORDER — SODIUM CHLORIDE 450 MG/100ML
INJECTION, SOLUTION INTRAVENOUS CONTINUOUS
Status: DISCONTINUED | OUTPATIENT
Start: 2023-08-23 | End: 2023-08-24 | Stop reason: HOSPADM

## 2023-08-23 RX ORDER — COSYNTROPIN 0.25 MG/ML
0.25 INJECTION, POWDER, FOR SOLUTION INTRAMUSCULAR; INTRAVENOUS ONCE
Status: DISCONTINUED | OUTPATIENT
Start: 2023-08-23 | End: 2023-08-23 | Stop reason: CLARIF

## 2023-08-23 RX ADMIN — COSYNTROPIN 50 MCG/HR: 0.25 INJECTION, POWDER, LYOPHILIZED, FOR SOLUTION INTRAMUSCULAR; INTRAVENOUS at 07:37

## 2023-08-23 RX ADMIN — SODIUM CHLORIDE: 4.5 INJECTION, SOLUTION INTRAVENOUS at 07:03

## 2023-08-23 RX ADMIN — IOPAMIDOL 35 ML: 612 INJECTION, SOLUTION INTRAVENOUS at 08:48

## 2023-08-23 ASSESSMENT — PAIN - FUNCTIONAL ASSESSMENT: PAIN_FUNCTIONAL_ASSESSMENT: NONE - DENIES PAIN

## 2023-08-23 NOTE — PROGRESS NOTES
9764 Patient received in IR for venous sampling. 5943 This procedure has been fully reviewed with the patient and written informed consent has been obtained. 8410 Procedure started with Dr. Thom Yang. 5040 Access obtained in right femoral vein with use of ultrasound. 2005 Procedure completed; patient tolerated well. Manual pressure being held to right groin site. 0849 Manual pressure discontinued. Band aid to puncture site; no bleeding noted. 7762 Patient on cart; comfort ensured. 7930 Patient taken to OPN via transport. Report called to Tadeo Fitzgerald RN in OPN.

## 2023-08-23 NOTE — H&P
Formulation and discussion of sedation / procedure plans, risks, benefits, side effects and alternatives with patient and/or responsible adult completed. History and Physical reviewed and unchanged.     Electronically signed by Ángel Knowles MD on 8/23/23 at 7:56 AM EDT

## 2023-08-23 NOTE — H&P
----- Message from Liam Simmons sent at 3/1/2018  3:12 PM CST -----  Contact: self  Pt called requesting refill on hydrocodone. Stated she had a fall and now has back pain. Contact pt at [phone].    Thanks-   [facility]  Sedation/Analgesia History & Physical    Pt Name: Petros Zheng  MRN: [id number]  YOB: 1945  Provider Performing Procedure: Urmila James MD, MD  Primary Care Physician: Blessing Mchugh MD    Formulation and discussion of sedation / procedure plans, risks, benefits, side effects and alternatives with patient and/or responsible adult completed. PRE-PROCEDURE   DNR-CCA/DNR-CC []Yes [x]No  Brief History/Pre-Procedure Diagnosis: hyperparathyroidism           MEDICAL HISTORY  []CAD/Valve  []Liver Disease  []Lung Disease []Diabetes  []Hypertension []Renal Disease  []Additional information:       has a past medical history of Arthritis, COVID-19, Kidney stone, Nephrolithiasis, Ovarian cancer ([address]), PONV (postoperative nausea and vomiting), Thyroid disease, and Tremors of nervous system. SURGICAL HISTORY   has a past surgical history that includes Tonsillectomy (1965); knee surgery (1995); Cholecystectomy, laparoscopic (08/2001); Hysterectomy, total abdominal (04/18/2018); joint replacement (Right, 2019); Lithotripsy (Left, 4/25/2023); Ureter surgery (Left, 5/2/2023); and Parathyroid gland surgery (Bilateral, 8/11/2023).   Additional information:       ALLERGIES   Allergies as of 08/23/2023 - Fully Reviewed 08/23/2023   Allergen Reaction Noted    Latex Rash 01/16/2019     Additional information:       MEDICATIONS   Coumadin Use Last 5 Days [x]No []Yes  Antiplatelet drug therapy use last 5 days  [x]No []Yes  Other anticoagulant use last 5 days  [x]No []Yes    Current Outpatient Medications:     zinc 50 MG TABS tablet, Take 1 tablet by mouth daily, Disp: , Rfl:     primidone (MYSOLINE) 50 MG tablet, Take 1 tablet by mouth 3 times daily, Disp: 270 tablet, Rfl: 3    oxybutynin (DITROPAN) 5 MG tablet, Take 1 tablet by mouth daily, Disp: , Rfl:     Handicap Placard MISC, by Does not apply route Expiration in 5 years, Disp: 1 each, Rfl: 0    vitamin B-12 (CYANOCOBALAMIN) 1000

## 2023-08-23 NOTE — OP NOTE
Department of Radiology  Post Procedure Progress Note      Pre-Procedure Diagnosis:  hyperparathyroidism     Procedure Performed:  venous sampling     Anesthesia: local     Findings: successful    Immediate Complications:  None    Estimated Blood Loss: minimal    SEE DICTATED PROCEDURE NOTE FOR COMPLETE DETAILS.     Jayy Brady MD   8/23/2023 8:43 AM

## 2023-08-23 NOTE — PROGRESS NOTES
___m_ Safety:       (Environmental)  [place] to environment  Ensure ID band is correct and in place/ allergy band as needed  Assess for fall risk  Initiate fall precautions as applicable (fall band, side rails, etc.)  Call light within reach  Bed in low position/ wheels locked    __m__ Pain:       Assess pain level and characteristics  Administer analgesics as ordered  Assess effectiveness of pain management and report to MD as needed    __m__ Knowledge Deficit:  Assess baseline knowledge  Provide teaching at level of understanding  Provide teaching via preferred learning method  Evaluate teaching effectiveness    ___m_ Hemodynamic/Respiratory Status:       (Pre and Post Procedure Monitoring)  Assess/Monitor vital signs and LOC  Assess Baseline SpO2 prior to any sedation  Obtain weight/height  Assess vital signs/ LOC until patient meets discharge criteria  Monitor procedure site and notify MD of any issues

## 2023-08-30 ENCOUNTER — CARE COORDINATION (OUTPATIENT)
Dept: CASE MANAGEMENT | Age: 78
End: 2023-08-30

## 2023-08-30 NOTE — CARE COORDINATION
[facility] Care Transitions Follow Up Call    Patient Current Location:  Home: [address] [place] [address],[address]     Care Transition Nurse contacted the patient by telephone to follow up after admission on . Verified name and  with patient as identifiers. Patient: Devan Fine  Patient : 1945   MRN: [id number]  Reason for Admission: Primary hyperparathyroidism s/p bilateral parathyroidectomy   Discharge Date: 23 RARS: Readmission Risk Score: 8.3      Needs to be reviewed by the provider   Additional needs identified to be addressed with provider: yes  Pablo Melvin is concerned she hasn't heard what her next steps are. Said there was talk about sending her to [facility]. Had her venous sampling last week. Please advise her. Thank you! Method of communication with provider: chart message. Spoke with Pablo Melvin, said she is doing good. Denies fever, pain or issues from surgery. Had her procedure last week. Has not checked BP lately but denies dizziness, headaches, chest pain. Taking medications as directed. Eating, drinking, sleeping good. Is concerned she hasn't heard what her next steps are. Had been talk about sending her to [facility] but hasn't heard. Told her I would send a message to ENT to call her, appreciative. No other issues to report. Denies any other needs. No other questions or concerns at this time. Will continue to follow. Addressed changes since last contact:  none  Discussed follow-up appointments.      Follow Up  Future Appointments   Date Time Provider [address]   2023  1:00 PM Wicho Vargas MD SRPX [facility]   10/23/2023 11:45 AM Nohemy Mcdowell MD AFL A[facility]X AFL A[facility] END   1/15/2024  9:30 AM Dino Whitten, [address] [facility][note 3] follow up appointment(s): na    Care Transition Nurse reviewed medical action plan and red flags with patient and discussed any barriers to care and/or understanding of plan of care after

## 2023-09-01 ENCOUNTER — TELEPHONE (OUTPATIENT)
Dept: ENT CLINIC | Age: 78
End: 2023-09-01

## 2023-09-01 NOTE — TELEPHONE ENCOUNTER
Pt Had IR venous sampling completed and needs follow up with Dr. Aashish Abdalla. Holding time on 9/7 at 9:20. LM for pt to CB and confirm.

## 2023-09-06 ENCOUNTER — HOSPITAL ENCOUNTER (OUTPATIENT)
Age: 78
Discharge: HOME | End: 2023-09-06
Payer: MEDICARE

## 2023-09-06 ENCOUNTER — OFFICE VISIT (OUTPATIENT)
Dept: FAMILY MEDICINE CLINIC | Age: 78
End: 2023-09-06

## 2023-09-06 VITALS
HEART RATE: 65 BPM | BODY MASS INDEX: 44.05 KG/M2 | SYSTOLIC BLOOD PRESSURE: 140 MMHG | WEIGHT: 258 LBS | TEMPERATURE: 98.7 F | DIASTOLIC BLOOD PRESSURE: 82 MMHG | HEIGHT: 64 IN | OXYGEN SATURATION: 97 %

## 2023-09-06 DIAGNOSIS — L57.0 AK (ACTINIC KERATOSIS): ICD-10-CM

## 2023-09-06 DIAGNOSIS — E53.8 B12 DEFICIENCY: ICD-10-CM

## 2023-09-06 DIAGNOSIS — R73.09 ELEVATED GLUCOSE: ICD-10-CM

## 2023-09-06 DIAGNOSIS — I10 PRIMARY HYPERTENSION: ICD-10-CM

## 2023-09-06 DIAGNOSIS — R53.83 OTHER FATIGUE: ICD-10-CM

## 2023-09-06 DIAGNOSIS — Z00.00 MEDICARE ANNUAL WELLNESS VISIT, SUBSEQUENT: Primary | ICD-10-CM

## 2023-09-06 DIAGNOSIS — R06.02 SOB (SHORTNESS OF BREATH): ICD-10-CM

## 2023-09-06 DIAGNOSIS — Z23 NEED FOR VACCINATION: ICD-10-CM

## 2023-09-06 PROBLEM — Z98.890 POST-OPERATIVE STATE: Status: RESOLVED | Noted: 2023-08-11 | Resolved: 2023-09-06

## 2023-09-06 LAB
CHOLEST SERPL-MCNC: 233 MG/DL (ref 100–199)
HDLC SERPL-MCNC: 64 MG/DL
LDLC SERPL CALC-MCNC: 148 MG/DL
TRIGL SERPL-MCNC: 103 MG/DL (ref 0–199)
TSH SERPL DL<=0.005 MIU/L-ACNC: 1.53 UIU/ML (ref 0.4–4.2)

## 2023-09-06 PROCEDURE — 84443 ASSAY THYROID STIM HORMONE: CPT

## 2023-09-06 PROCEDURE — 36415 COLL VENOUS BLD VENIPUNCTURE: CPT

## 2023-09-06 PROCEDURE — 80061 LIPID PANEL: CPT

## 2023-09-06 PROCEDURE — 83036 HEMOGLOBIN GLYCOSYLATED A1C: CPT

## 2023-09-06 PROCEDURE — 82607 VITAMIN B-12: CPT

## 2023-09-06 PROCEDURE — 82746 ASSAY OF FOLIC ACID SERUM: CPT

## 2023-09-06 RX ORDER — ALBUTEROL SULFATE 90 UG/1
2 AEROSOL, METERED RESPIRATORY (INHALATION) 4 TIMES DAILY PRN
Qty: 18 G | Refills: 1 | Status: SHIPPED | OUTPATIENT
Start: 2023-09-06

## 2023-09-06 RX ORDER — LOSARTAN POTASSIUM 25 MG/1
25 TABLET ORAL DAILY
Qty: 90 TABLET | Refills: 1 | Status: SHIPPED | OUTPATIENT
Start: 2023-09-06

## 2023-09-06 ASSESSMENT — PATIENT HEALTH QUESTIONNAIRE - PHQ9
9. THOUGHTS THAT YOU WOULD BE BETTER OFF DEAD, OR OF HURTING YOURSELF: 0
2. FEELING DOWN, DEPRESSED OR HOPELESS: 0
6. FEELING BAD ABOUT YOURSELF - OR THAT YOU ARE A FAILURE OR HAVE LET YOURSELF OR YOUR FAMILY DOWN: 0
SUM OF ALL RESPONSES TO PHQ QUESTIONS 1-9: 0
8. MOVING OR SPEAKING SO SLOWLY THAT OTHER PEOPLE COULD HAVE NOTICED. OR THE OPPOSITE, BEING SO FIGETY OR RESTLESS THAT YOU HAVE BEEN MOVING AROUND A LOT MORE THAN USUAL: 0
5. POOR APPETITE OR OVEREATING: 0
1. LITTLE INTEREST OR PLEASURE IN DOING THINGS: 0
SUM OF ALL RESPONSES TO PHQ QUESTIONS 1-9: 0
SUM OF ALL RESPONSES TO PHQ QUESTIONS 1-9: 0
SUM OF ALL RESPONSES TO PHQ9 QUESTIONS 1 & 2: 0
SUM OF ALL RESPONSES TO PHQ QUESTIONS 1-9: 0
4. FEELING TIRED OR HAVING LITTLE ENERGY: 0
10. IF YOU CHECKED OFF ANY PROBLEMS, HOW DIFFICULT HAVE THESE PROBLEMS MADE IT FOR YOU TO DO YOUR WORK, TAKE CARE OF THINGS AT HOME, OR GET ALONG WITH OTHER PEOPLE: 0
7. TROUBLE CONCENTRATING ON THINGS, SUCH AS READING THE NEWSPAPER OR WATCHING TELEVISION: 0
3. TROUBLE FALLING OR STAYING ASLEEP: 0

## 2023-09-06 ASSESSMENT — LIFESTYLE VARIABLES
HOW MANY STANDARD DRINKS CONTAINING ALCOHOL DO YOU HAVE ON A TYPICAL DAY: 1 OR 2
HOW OFTEN DO YOU HAVE A DRINK CONTAINING ALCOHOL: MONTHLY OR LESS

## 2023-09-07 ENCOUNTER — OFFICE VISIT (OUTPATIENT)
Dept: ENT CLINIC | Age: 78
End: 2023-09-07
Payer: MEDICARE

## 2023-09-07 ENCOUNTER — TELEPHONE (OUTPATIENT)
Dept: FAMILY MEDICINE CLINIC | Age: 78
End: 2023-09-07

## 2023-09-07 ENCOUNTER — CARE COORDINATION (OUTPATIENT)
Dept: CASE MANAGEMENT | Age: 78
End: 2023-09-07

## 2023-09-07 ENCOUNTER — NURSE ONLY (OUTPATIENT)
Dept: LAB | Age: 78
End: 2023-09-07

## 2023-09-07 VITALS
HEIGHT: 64 IN | DIASTOLIC BLOOD PRESSURE: 82 MMHG | WEIGHT: 257.3 LBS | SYSTOLIC BLOOD PRESSURE: 138 MMHG | RESPIRATION RATE: 20 BRPM | HEART RATE: 62 BPM | OXYGEN SATURATION: 97 % | TEMPERATURE: 97.7 F | BODY MASS INDEX: 43.93 KG/M2

## 2023-09-07 DIAGNOSIS — E21.0 PRIMARY HYPERPARATHYROIDISM (HCC): Primary | ICD-10-CM

## 2023-09-07 DIAGNOSIS — E53.8 LOW FOLIC ACID: Primary | ICD-10-CM

## 2023-09-07 DIAGNOSIS — E21.0 PRIMARY HYPERPARATHYROIDISM (HCC): ICD-10-CM

## 2023-09-07 DIAGNOSIS — E83.52 HYPERCALCEMIA: ICD-10-CM

## 2023-09-07 LAB
25(OH)D3 SERPL-MCNC: 27 NG/ML (ref 30–100)
CALCIUM SERPL-MCNC: 10.9 MG/DL (ref 8.5–10.5)
DEPRECATED MEAN GLUCOSE BLD GHB EST-ACNC: 99 MG/DL (ref 70–126)
FOLATE SERPL-MCNC: 3.1 NG/ML (ref 4.8–24.2)
HBA1C MFR BLD HPLC: 5.3 % (ref 4.4–6.4)
MAGNESIUM SERPL-MCNC: 2 MG/DL (ref 1.6–2.4)
PTH-INTACT SERPL-MCNC: 212.4 PG/ML (ref 15–65)
VIT B12 SERPL-MCNC: 767 PG/ML (ref 211–911)

## 2023-09-07 PROCEDURE — G8417 CALC BMI ABV UP PARAM F/U: HCPCS | Performed by: OTOLARYNGOLOGY

## 2023-09-07 PROCEDURE — 1036F TOBACCO NON-USER: CPT | Performed by: OTOLARYNGOLOGY

## 2023-09-07 PROCEDURE — 3017F COLORECTAL CA SCREEN DOC REV: CPT | Performed by: OTOLARYNGOLOGY

## 2023-09-07 PROCEDURE — G8400 PT W/DXA NO RESULTS DOC: HCPCS | Performed by: OTOLARYNGOLOGY

## 2023-09-07 PROCEDURE — 99215 OFFICE O/P EST HI 40 MIN: CPT | Performed by: OTOLARYNGOLOGY

## 2023-09-07 PROCEDURE — 1111F DSCHRG MED/CURRENT MED MERGE: CPT | Performed by: OTOLARYNGOLOGY

## 2023-09-07 PROCEDURE — 1124F ACP DISCUSS-NO DSCNMKR DOCD: CPT | Performed by: OTOLARYNGOLOGY

## 2023-09-07 PROCEDURE — 1090F PRES/ABSN URINE INCON ASSESS: CPT | Performed by: OTOLARYNGOLOGY

## 2023-09-07 PROCEDURE — G8427 DOCREV CUR MEDS BY ELIG CLIN: HCPCS | Performed by: OTOLARYNGOLOGY

## 2023-09-07 ASSESSMENT — ENCOUNTER SYMPTOMS
SHORTNESS OF BREATH: 0
STRIDOR: 0
FACIAL SWELLING: 0
CHOKING: 0
VOMITING: 0
VOICE CHANGE: 0
RHINORRHEA: 0
SORE THROAT: 0
CHEST TIGHTNESS: 0
SINUS PRESSURE: 0
ABDOMINAL PAIN: 0
NAUSEA: 0
COLOR CHANGE: 0
DIARRHEA: 0
TROUBLE SWALLOWING: 0
COUGH: 0
WHEEZING: 0
APNEA: 0

## 2023-09-07 NOTE — PROGRESS NOTES
performed by Teofilo Vargas MD at [address] [facility] Bilateral 8/11/2023    BILATRAL PARATHYROIDECTOMY performed by Ubaldo Lobato MD at [facility] Left 5/2/2023    Cystoscopy,Left ureteroscopy, laser lithotripsyleft ureteral stent placement. performed by Meryl Shukla MD at [address] [place] [address] Drive History   Problem Relation Age of Onset    Cancer Father     Mult Sclerosis Sister     Hearing Loss Maternal Grandfather      Social History     Tobacco Use    Smoking status: Never    Smokeless tobacco: Never   Substance Use Topics    Alcohol use: Yes     Comment: occasional       Subjective:      Review of Systems   Constitutional:  Negative for activity change, appetite change, chills, diaphoresis, fatigue, fever and unexpected weight change. HENT:  Negative for congestion, dental problem, ear discharge, ear pain, facial swelling, hearing loss, mouth sores, nosebleeds, postnasal drip, rhinorrhea, sinus pressure, sneezing, sore throat, tinnitus, trouble swallowing and voice change. Eyes:  Negative for visual disturbance. Respiratory:  Negative for apnea, cough, choking, chest tightness, shortness of breath, wheezing and stridor. Cardiovascular:  Negative for chest pain, palpitations and leg swelling. Gastrointestinal:  Negative for abdominal pain, diarrhea, nausea and vomiting. Endocrine: Negative for cold intolerance, heat intolerance, polydipsia and polyuria. Genitourinary:  Negative for difficulty urinating, dysuria, enuresis, hematuria and urgency. Musculoskeletal:  Negative for arthralgias, gait problem, neck pain and neck stiffness. Skin:  Negative for color change and rash. Allergic/Immunologic: Negative for environmental allergies, food allergies and immunocompromised state. Neurological:  Negative for dizziness, syncope, facial asymmetry, speech difficulty, light-headedness and headaches. Hematological:  Negative for adenopathy.  Does

## 2023-09-07 NOTE — CARE COORDINATION
[facility] Care Transitions Follow Up Call-Final call    Patient Current Location: [address] [place] [address] Transition Nurse contacted the patient by telephone to follow up after admission on 23. Verified name and  with patient as identifiers. Patient: Han Bello  Patient : 1945   MRN: [id number]  Reason for Admission: hyperparathyroidism s/p bilateral parathyroidectomy   Discharge Date: 23 RARS: Readmission Risk Score: 8.3      Needs to be reviewed by the provider   Additional needs identified to be addressed with provider: No  none             Method of communication with provider: none. Spoke with Lesley Loja, said she is doing pretty good. Denies fever, pain. No post op issues. Saw PCP yesterday and wants her to take folic acid 1 mg but the pharmacy said 1 mg dose requires prescription so did get 800 mcg to take. Told her that should be ok. She was also started on low dose losartan for BP. Saw ENT today and are going to send a referral to [facility]. Final call. No other issues to report. Denies any other needs. No other questions or concerns at this time. Thanked me for my calls and help. Addressed changes since last contact:  medications-losartan, folic acid  Discussed follow-up appointments. Follow Up  Future Appointments   Date Time Provider [address]   10/23/2023 11:45 AM Lizbet Oconnor MD [facility] END   1/15/2024  9:30  [address], [address] [place] [address]   3/6/2024  2:20 PM Stan Whiting MD SRPX [facility] [place]     Non-[facility] follow up appointment(s): eugenia    Care Transition Nurse reviewed medical action plan and red flags with patient and discussed any barriers to care and/or understanding of plan of care after discharge. Discussed appropriate site of care based on symptoms and resources available to patient including: PCP  Specialist  Urgent care clinics  When to call [address]  The patient agrees to contact the PCP office for questions

## 2023-09-07 NOTE — RESULT ENCOUNTER NOTE
Please let patient know that her labs show a low folic acid level. Cholesterol is higher than desired with an LDL of 148, goal being <130 and closer to < 100 if possible. Otherwise, glucose, vitamin B12 and thyroid are good. Recommendations are:  -- folic acid OTC 1 mg orally daily  -- continue vitamin B12   -- increase fiber intake from vegetable sources and others, minimize processed foods, avoid sweetened drinks, decrease animal fat intake, and increase activity as able. -- recheck of folic acid to be done in 3 months.

## 2023-09-07 NOTE — TELEPHONE ENCOUNTER
----- Message from Vicki Manning MD sent at 9/7/2023  5:46 AM EDT -----  Please let patient know that her labs show a low folic acid level. Cholesterol is higher than desired with an LDL of 148, goal being <130 and closer to < 100 if possible. Otherwise, glucose, vitamin B12 and thyroid are good. Recommendations are:  -- folic acid OTC 1 mg orally daily  -- continue vitamin B12   -- increase fiber intake from vegetable sources and others, minimize processed foods, avoid sweetened drinks, decrease animal fat intake, and increase activity as able. -- recheck of folic acid to be done in 3 months.

## 2023-09-09 LAB — CA-I SERPL ISE-SCNC: NORMAL MMOL/L

## 2023-09-11 ENCOUNTER — CARE COORDINATION (OUTPATIENT)
Dept: CARE COORDINATION | Age: 78
End: 2023-09-11

## 2023-09-11 NOTE — CARE COORDINATION
SW called pt to follow up with ACP documents sent to her. Pt stated she did receive and is \"working through them. \" Inquired if pt needs any assistance and she stated, I don't think so. Advised her to call me, as my contact information is in letter form included in packet. Pt voiced understanding.

## 2023-11-01 ENCOUNTER — OFFICE VISIT (OUTPATIENT)
Dept: FAMILY MEDICINE CLINIC | Age: 78
End: 2023-11-01

## 2023-11-01 ENCOUNTER — HOSPITAL ENCOUNTER (OUTPATIENT)
Age: 78
Discharge: HOME | End: 2023-11-01
Payer: MEDICARE

## 2023-11-01 VITALS
SYSTOLIC BLOOD PRESSURE: 126 MMHG | HEART RATE: 71 BPM | WEIGHT: 260.4 LBS | BODY MASS INDEX: 44.46 KG/M2 | RESPIRATION RATE: 20 BRPM | TEMPERATURE: 97.4 F | OXYGEN SATURATION: 96 % | HEIGHT: 64 IN | DIASTOLIC BLOOD PRESSURE: 86 MMHG

## 2023-11-01 DIAGNOSIS — F43.9 SITUATIONAL STRESS: ICD-10-CM

## 2023-11-01 DIAGNOSIS — E83.42 HYPOMAGNESEMIA: ICD-10-CM

## 2023-11-01 DIAGNOSIS — R53.83 OTHER FATIGUE: ICD-10-CM

## 2023-11-01 DIAGNOSIS — R53.83 OTHER FATIGUE: Primary | ICD-10-CM

## 2023-11-01 DIAGNOSIS — E21.3 HYPERPARATHYROIDISM (HCC): ICD-10-CM

## 2023-11-01 LAB
25(OH)D3 SERPL-MCNC: 26 NG/ML (ref 30–100)
BACTERIA URNS QL MICRO: ABNORMAL /HPF
BASOPHILS ABSOLUTE: 0 THOU/MM3 (ref 0–0.1)
BASOPHILS NFR BLD AUTO: 0.6 %
BILIRUB UR QL STRIP.AUTO: NEGATIVE
CASTS #/AREA URNS LPF: ABNORMAL /LPF
CASTS 2: ABNORMAL /LPF
CHARACTER UR: CLEAR
COLOR: YELLOW
CRYSTALS URNS MICRO: ABNORMAL
DEPRECATED RDW RBC AUTO: 49 FL (ref 35–45)
EOSINOPHIL NFR BLD AUTO: 3.1 %
EOSINOPHILS ABSOLUTE: 0.3 THOU/MM3 (ref 0–0.4)
EPITHELIAL CELLS, UA: ABNORMAL /HPF
ERYTHROCYTE [DISTWIDTH] IN BLOOD BY AUTOMATED COUNT: 13.5 % (ref 11.5–14.5)
GLUCOSE UR QL STRIP.AUTO: NEGATIVE MG/DL
HCT VFR BLD AUTO: 40.6 % (ref 37–47)
HGB BLD-MCNC: 12.9 GM/DL (ref 12–16)
HGB UR QL STRIP.AUTO: NEGATIVE
IMM GRANULOCYTES # BLD AUTO: 0.02 THOU/MM3 (ref 0–0.07)
IMM GRANULOCYTES NFR BLD AUTO: 0.2 %
KETONES UR QL STRIP.AUTO: NEGATIVE
LYMPHOCYTES ABSOLUTE: 2.2 THOU/MM3 (ref 1–4.8)
LYMPHOCYTES NFR BLD AUTO: 27.4 %
MCH RBC QN AUTO: 31.3 PG (ref 26–33)
MCHC RBC AUTO-ENTMCNC: 31.8 GM/DL (ref 32.2–35.5)
MCV RBC AUTO: 98.5 FL (ref 81–99)
MISCELLANEOUS 2: ABNORMAL
MONOCYTES ABSOLUTE: 0.5 THOU/MM3 (ref 0.4–1.3)
MONOCYTES NFR BLD AUTO: 5.9 %
NEUTROPHILS NFR BLD AUTO: 62.8 %
NITRITE UR QL STRIP: NEGATIVE
NRBC BLD AUTO-RTO: 0 /100 WBC
PH UR STRIP.AUTO: 7 [PH] (ref 5–9)
PLATELET # BLD AUTO: 202 THOU/MM3 (ref 130–400)
PMV BLD AUTO: 11.1 FL (ref 9.4–12.4)
PROT UR STRIP.AUTO-MCNC: NEGATIVE MG/DL
RBC # BLD AUTO: 4.12 MILL/MM3 (ref 4.2–5.4)
RBC URINE: ABNORMAL /HPF
RENAL EPI CELLS #/AREA URNS HPF: ABNORMAL /[HPF]
SEGMENTED NEUTROPHILS ABSOLUTE COUNT: 5.1 THOU/MM3 (ref 1.8–7.7)
SP GR UR REFRACT.AUTO: 1.01 (ref 1–1.03)
UROBILINOGEN, URINE: 0.2 EU/DL (ref 0–1)
WBC # BLD AUTO: 8.1 THOU/MM3 (ref 4.8–10.8)
WBC #/AREA URNS HPF: ABNORMAL /HPF
WBC #/AREA URNS HPF: ABNORMAL /[HPF]
YEAST LIKE FUNGI URNS QL MICRO: ABNORMAL

## 2023-11-01 PROCEDURE — 81001 URINALYSIS AUTO W/SCOPE: CPT

## 2023-11-01 PROCEDURE — 87086 URINE CULTURE/COLONY COUNT: CPT

## 2023-11-01 PROCEDURE — 80048 BASIC METABOLIC PNL TOTAL CA: CPT

## 2023-11-01 PROCEDURE — 85025 COMPLETE CBC W/AUTO DIFF WBC: CPT

## 2023-11-01 PROCEDURE — 83735 ASSAY OF MAGNESIUM: CPT

## 2023-11-01 PROCEDURE — 83970 ASSAY OF PARATHORMONE: CPT

## 2023-11-01 PROCEDURE — 84443 ASSAY THYROID STIM HORMONE: CPT

## 2023-11-01 PROCEDURE — 82306 VITAMIN D 25 HYDROXY: CPT

## 2023-11-01 PROCEDURE — 36415 COLL VENOUS BLD VENIPUNCTURE: CPT

## 2023-11-01 RX ORDER — FOLIC ACID 1 MG/1
1 TABLET ORAL DAILY
COMMUNITY

## 2023-11-01 NOTE — PROGRESS NOTES
Madeline Sanchez (:  1945) is a 70 y.o. female,Established patient, here for evaluation of the following chief complaint(s):  Discuss Labs (Parathyroid was removed and now her levels are low and overall does not feel well. )         ASSESSMENT/PLAN:  1. Other fatigue  -     TSH With Reflex Ft4; Future  -     CBC with Auto Differential; Future  -     Basic Metabolic Panel; Future  -     Magnesium; Future  -     Urinalysis with Reflex to Culture  2. Situational stress  -     TSH With Reflex Ft4; Future  -     CBC with Auto Differential; Future  -     Basic Metabolic Panel; Future  -     Magnesium; Future  -     Urinalysis with Reflex to Culture  3. Hypomagnesemia  -     TSH With Reflex Ft4; Future  -     CBC with Auto Differential; Future  -     Basic Metabolic Panel; Future  -     Magnesium; Future  -     Urinalysis with Reflex to Culture    Obtain labs   Hydrate well, rest    Return for january as planned. Subjective   SUBJECTIVE/OBJECTIVE:  HPI    Patient recently had surgery for parathyroid adenoma, 10/24. Endocrinologist wanted a review of labs as patient not feeling well. She feels somewhat tired. Can't describe it well. However she has been at high calcium levels for a long time. That changed soon after surgery. She denies fever/chills, SOB, CP, dizziness. Urination and bowels are fine. She can swallow okay. Wound healing nicely. 10/24/2023 - labs  Hemoglobin 11.1 and hematocrit 33.4 -- postop  Calcium 8.5 --normal phosphorus, intact PTH 52.9 normal  Magnesium 1.4 on low end at hospital, given infusion. CO2 19 -- post op  2023  Vitamin D 27, PTH had been 170s to 380s in the last couple of months    No HA since hospital.  Tearful, worn a bit from a long day. Able to eat some, swallow okay, bowls okay. Urination working. Drinking lots of water. Review of Systems    As above    Objective   Physical Exam  Constitutional:       General: She is not in acute distress.

## 2023-11-02 ENCOUNTER — TELEPHONE (OUTPATIENT)
Dept: FAMILY MEDICINE CLINIC | Age: 78
End: 2023-11-02

## 2023-11-02 LAB
ANION GAP SERPL CALC-SCNC: 15 MEQ/L (ref 8–16)
BUN SERPL-MCNC: 9 MG/DL (ref 7–22)
CALCIUM SERPL-MCNC: 9.7 MG/DL (ref 8.5–10.5)
CHLORIDE SERPL-SCNC: 105 MEQ/L (ref 98–111)
CO2 SERPL-SCNC: 24 MEQ/L (ref 23–33)
CREAT SERPL-MCNC: 0.8 MG/DL (ref 0.4–1.2)
GFR SERPL CREATININE-BSD FRML MDRD: > 60 ML/MIN/1.73M2
GLUCOSE SERPL-MCNC: 112 MG/DL (ref 70–108)
MAGNESIUM SERPL-MCNC: 2.1 MG/DL (ref 1.6–2.4)
POTASSIUM SERPL-SCNC: 3.9 MEQ/L (ref 3.5–5.2)
PTH-INTACT SERPL-MCNC: 86.5 PG/ML (ref 15–65)
SODIUM SERPL-SCNC: 144 MEQ/L (ref 135–145)
TSH SERPL DL<=0.005 MIU/L-ACNC: 1.53 UIU/ML (ref 0.4–4.2)

## 2023-11-02 NOTE — TELEPHONE ENCOUNTER
----- Message from Christian Kehr, MD sent at 11/2/2023  1:34 PM EDT -----  Please let patient know that her laboratory and urine studies are overall reassuring. Everything looks fairly normalized. I recommend more aggressive hydration and maybe include a cup of broth daily or liquid IV with good amount of water for 2-3 days -- while it's a bit of sodium load, it's a temporary measure to help during the transition and recovery.

## 2023-11-02 NOTE — RESULT ENCOUNTER NOTE
Please let patient know that her laboratory and urine studies are overall reassuring. Everything looks fairly normalized. I recommend more aggressive hydration and maybe include a cup of broth daily or liquid IV with good amount of water for 2-3 days -- while it's a bit of sodium load, it's a temporary measure to help during the transition and recovery.

## 2023-11-03 LAB
BACTERIA UR CULT: ABNORMAL
ORGANISM: ABNORMAL

## 2024-01-09 ENCOUNTER — HOSPITAL ENCOUNTER (OUTPATIENT)
Dept: WOMENS IMAGING | Age: 79
Discharge: HOME | End: 2024-01-09
Attending: INTERNAL MEDICINE
Payer: MEDICARE

## 2024-01-09 DIAGNOSIS — E21.3 HYPERPARATHYROIDISM (HCC): ICD-10-CM

## 2024-01-09 DIAGNOSIS — Z78.0 POST-MENOPAUSAL: ICD-10-CM

## 2024-01-09 PROCEDURE — 77080 DXA BONE DENSITY AXIAL: CPT

## 2024-01-30 ENCOUNTER — OFFICE VISIT (OUTPATIENT)
Age: 79
End: 2024-01-30
Payer: MEDICARE

## 2024-01-30 VITALS
SYSTOLIC BLOOD PRESSURE: 136 MMHG | HEIGHT: 64 IN | HEART RATE: 77 BPM | WEIGHT: 261.2 LBS | BODY MASS INDEX: 44.59 KG/M2 | DIASTOLIC BLOOD PRESSURE: 82 MMHG

## 2024-01-30 DIAGNOSIS — E21.3 HYPERPARATHYROIDISM (HCC): Primary | ICD-10-CM

## 2024-01-30 DIAGNOSIS — E55.9 VITAMIN D DEFICIENCY: ICD-10-CM

## 2024-01-30 DIAGNOSIS — M81.0 OSTEOPOROSIS WITHOUT CURRENT PATHOLOGICAL FRACTURE, UNSPECIFIED OSTEOPOROSIS TYPE: ICD-10-CM

## 2024-01-30 PROCEDURE — 1124F ACP DISCUSS-NO DSCNMKR DOCD: CPT | Performed by: INTERNAL MEDICINE

## 2024-01-30 PROCEDURE — 99214 OFFICE O/P EST MOD 30 MIN: CPT | Performed by: INTERNAL MEDICINE

## 2024-01-30 NOTE — PATIENT INSTRUCTIONS
Get repeat lab work done. Contact the office 1 week after getting labs done if you do not hear from us.  Avoid falls if at all possible  Follow-up in 3 months

## 2024-01-30 NOTE — PROGRESS NOTES
Date:   4/30/2024     Scheduling Instructions:      WITH CREATININE AND SODIUM MEASUREMENT.          The risks and benefits of my recommendations, as well as other treatment options were discussed with the patient today. Questions were answered.  Follow up: 3 months and as needed.      The patient was seen and discussed with Dr. Teresa.       Electronically signed by Pawel Sepulveda DO 1/30/2024 11:18 AM       **This report has been created using voice recognition software. It may   contain minor errors which are inherent in voice recognition technology.**

## 2024-01-31 ENCOUNTER — HOSPITAL ENCOUNTER (OUTPATIENT)
Age: 79
Discharge: HOME | End: 2024-01-31
Payer: MEDICARE

## 2024-01-31 DIAGNOSIS — E21.3 HYPERPARATHYROIDISM (HCC): ICD-10-CM

## 2024-01-31 DIAGNOSIS — M81.0 OSTEOPOROSIS WITHOUT CURRENT PATHOLOGICAL FRACTURE, UNSPECIFIED OSTEOPOROSIS TYPE: ICD-10-CM

## 2024-01-31 DIAGNOSIS — E55.9 VITAMIN D DEFICIENCY: ICD-10-CM

## 2024-01-31 LAB
ANION GAP SERPL CALC-SCNC: 9 MEQ/L (ref 8–16)
BUN SERPL-MCNC: 12 MG/DL (ref 7–22)
CALCIUM SERPL-MCNC: 9.7 MG/DL (ref 8.5–10.5)
CHLORIDE SERPL-SCNC: 102 MEQ/L (ref 98–111)
CO2 SERPL-SCNC: 27 MEQ/L (ref 23–33)
CREAT SERPL-MCNC: 0.8 MG/DL (ref 0.4–1.2)
GFR SERPL CREATININE-BSD FRML MDRD: > 60 ML/MIN/1.73M2
GLUCOSE SERPL-MCNC: 87 MG/DL (ref 70–108)
POTASSIUM SERPL-SCNC: 4.2 MEQ/L (ref 3.5–5.2)
SODIUM SERPL-SCNC: 138 MEQ/L (ref 135–145)
TSH SERPL DL<=0.005 MIU/L-ACNC: 1.73 UIU/ML (ref 0.4–4.2)

## 2024-01-31 PROCEDURE — 84300 ASSAY OF URINE SODIUM: CPT

## 2024-01-31 PROCEDURE — 80048 BASIC METABOLIC PNL TOTAL CA: CPT

## 2024-01-31 PROCEDURE — 82306 VITAMIN D 25 HYDROXY: CPT

## 2024-01-31 PROCEDURE — 84443 ASSAY THYROID STIM HORMONE: CPT

## 2024-01-31 PROCEDURE — 82340 ASSAY OF CALCIUM IN URINE: CPT

## 2024-01-31 PROCEDURE — 83970 ASSAY OF PARATHORMONE: CPT

## 2024-01-31 PROCEDURE — 36415 COLL VENOUS BLD VENIPUNCTURE: CPT

## 2024-01-31 PROCEDURE — 82570 ASSAY OF URINE CREATININE: CPT

## 2024-02-01 LAB
25(OH)D3 SERPL-MCNC: 32 NG/ML (ref 30–100)
CALCIUM 24H UR-MRATE: 198 MG/24HR (ref 100–240)
CALCIUM UR-MCNC: 8.1 MG/DL
CREAT 24H UR-MRATE: 1.1 GM/24HR (ref 0.7–1.6)
CREAT UR-MCNC: 44.8 MG/DL
HOURS COLLECTED: 24 HRS
PTH-INTACT SERPL-MCNC: 98.6 PG/ML (ref 15–65)
SODIUM 24H UR-SRATE: 162 MEQ/24HR (ref 75–200)
SODIUM UR-SCNC: 66 MEQ/L
URINE VOLUME MEASURE: 2450 ML
URINE VOLUME, 24 HOUR: 2450 ML
URINE VOLUME, 24 HOUR: 2450 ML

## 2024-02-12 ENCOUNTER — TELEPHONE (OUTPATIENT)
Age: 79
End: 2024-02-12

## 2024-02-13 ENCOUNTER — CLINICAL DOCUMENTATION (OUTPATIENT)
Age: 79
End: 2024-02-13

## 2024-02-14 NOTE — PROGRESS NOTES
Labs discussed with the patient.  I have recommended bisphosphonate therapy versus Prolia.  She will think about it and call me back.

## 2024-02-26 ENCOUNTER — TELEPHONE (OUTPATIENT)
Age: 79
End: 2024-02-26

## 2024-03-06 ENCOUNTER — HOSPITAL ENCOUNTER (OUTPATIENT)
Age: 79
Discharge: HOME | End: 2024-03-06
Payer: MEDICARE

## 2024-03-06 ENCOUNTER — OFFICE VISIT (OUTPATIENT)
Dept: FAMILY MEDICINE CLINIC | Age: 79
End: 2024-03-06

## 2024-03-06 VITALS
RESPIRATION RATE: 16 BRPM | HEART RATE: 64 BPM | TEMPERATURE: 97.6 F | DIASTOLIC BLOOD PRESSURE: 80 MMHG | SYSTOLIC BLOOD PRESSURE: 130 MMHG | WEIGHT: 262 LBS | OXYGEN SATURATION: 97 % | HEIGHT: 65 IN | BODY MASS INDEX: 43.65 KG/M2

## 2024-03-06 DIAGNOSIS — M53.3 PAIN OF RIGHT SACROILIAC JOINT: ICD-10-CM

## 2024-03-06 DIAGNOSIS — G25.0 BENIGN ESSENTIAL TREMOR: ICD-10-CM

## 2024-03-06 DIAGNOSIS — M81.0 AGE-RELATED OSTEOPOROSIS WITHOUT CURRENT PATHOLOGICAL FRACTURE: ICD-10-CM

## 2024-03-06 DIAGNOSIS — Z85.43 HISTORY OF OVARIAN CANCER: ICD-10-CM

## 2024-03-06 DIAGNOSIS — G24.3 CERVICAL DYSTONIA: ICD-10-CM

## 2024-03-06 DIAGNOSIS — M25.551 POSTERIOR PAIN OF RIGHT HIP: ICD-10-CM

## 2024-03-06 DIAGNOSIS — M17.0 PRIMARY OSTEOARTHRITIS OF BOTH KNEES: Primary | ICD-10-CM

## 2024-03-06 PROBLEM — I77.9 CAROTID ARTERY DISORDER (HCC): Status: ACTIVE | Noted: 2024-03-06

## 2024-03-06 PROBLEM — I50.22 CHRONIC SYSTOLIC (CONGESTIVE) HEART FAILURE (HCC): Status: ACTIVE | Noted: 2024-03-06

## 2024-03-06 PROBLEM — C56.1: Status: RESOLVED | Noted: 2018-04-03 | Resolved: 2024-03-06

## 2024-03-06 PROCEDURE — 86304 IMMUNOASSAY TUMOR CA 125: CPT

## 2024-03-06 PROCEDURE — 36415 COLL VENOUS BLD VENIPUNCTURE: CPT

## 2024-03-06 RX ORDER — PRIMIDONE 50 MG/1
TABLET ORAL
Qty: 540 TABLET | Refills: 3 | Status: SHIPPED | OUTPATIENT
Start: 2024-03-06

## 2024-03-06 SDOH — ECONOMIC STABILITY: FOOD INSECURITY: WITHIN THE PAST 12 MONTHS, THE FOOD YOU BOUGHT JUST DIDN'T LAST AND YOU DIDN'T HAVE MONEY TO GET MORE.: NEVER TRUE

## 2024-03-06 SDOH — ECONOMIC STABILITY: FOOD INSECURITY: WITHIN THE PAST 12 MONTHS, YOU WORRIED THAT YOUR FOOD WOULD RUN OUT BEFORE YOU GOT MONEY TO BUY MORE.: NEVER TRUE

## 2024-03-06 SDOH — ECONOMIC STABILITY: INCOME INSECURITY: HOW HARD IS IT FOR YOU TO PAY FOR THE VERY BASICS LIKE FOOD, HOUSING, MEDICAL CARE, AND HEATING?: NOT HARD AT ALL

## 2024-03-06 ASSESSMENT — PATIENT HEALTH QUESTIONNAIRE - PHQ9
SUM OF ALL RESPONSES TO PHQ9 QUESTIONS 1 & 2: 0
2. FEELING DOWN, DEPRESSED OR HOPELESS: 0
SUM OF ALL RESPONSES TO PHQ QUESTIONS 1-9: 0
1. LITTLE INTEREST OR PLEASURE IN DOING THINGS: 0
SUM OF ALL RESPONSES TO PHQ QUESTIONS 1-9: 0
SUM OF ALL RESPONSES TO PHQ QUESTIONS 1-9: 0

## 2024-03-06 NOTE — PROGRESS NOTES
levels are being followed.  Endocrinology follow-up for her primary hyperparathyroidism.  Calcium levels thankfully have been doing well.    OA -- celebrex out, increasing pain started so she took some over-the-counter options.  Advil duo 2 am and 1 at night.  This is doing the same for her knee pain and a seems cheap enough for her.  She does not want to go back to Celebrex at this point.    Right hip with some pain, started recently, no trauma/injury.   Buttock cheek pain, not groin rejoin.  No swelling in the leg.  No abdominal pain, nausea or vomiting.    Lab Results   Component Value Date    CREATININE 0.8 01/31/2024     Lab Results   Component Value Date/Time    VITD25 32 01/31/2024 02:27 PM      Patient with history of ovarian cancer.  She missed obtaining her  with gynecology.  She does not have ordered anymore.  She wonders she can just do it next-door here in the ambulatory care setting.  She is not having any pelvic pain.    Patient with essential tremor that thankfully has been stable.  Not progressing except for her neck.  It is not affecting her gait.  Not affecting her vision.    Review of Systems   No fevers or chills.  No shortness of breath or chest pain.  No nausea or vomiting.  Knee trouble and also hip trouble.    Objective   Physical Exam  Constitutional:       General: She is not in acute distress.     Appearance: Normal appearance. She is not ill-appearing.   Cardiovascular:      Rate and Rhythm: Normal rate and regular rhythm.      Heart sounds: No murmur heard.  Pulmonary:      Effort: Pulmonary effort is normal. No respiratory distress.      Breath sounds: Normal breath sounds. No wheezing.   Musculoskeletal:         General: No swelling.      Comments: Patient without any midline pain.  Right Hip range of motion without any groin discomfort.  She does have discomfort on the buttock region.  Juanita's test is positive. leg strength appears to be normal.  No sensory deficits.

## 2024-03-07 DIAGNOSIS — I10 PRIMARY HYPERTENSION: ICD-10-CM

## 2024-03-07 LAB — CANCER AG125 SERPL-ACNC: 13 U/ML (ref 0–38)

## 2024-03-07 RX ORDER — LOSARTAN POTASSIUM 25 MG/1
25 TABLET ORAL DAILY
Qty: 90 TABLET | Refills: 3 | Status: SHIPPED | OUTPATIENT
Start: 2024-03-07

## 2024-03-07 NOTE — TELEPHONE ENCOUNTER
Blayne Rubin called requesting a refill on the following medications:  Requested Prescriptions     Pending Prescriptions Disp Refills    losartan (COZAAR) 25 MG tablet [Pharmacy Med Name: Losartan Potassium 25 MG Oral Tablet] 90 tablet 0     Sig: Take 1 tablet by mouth once daily       Date of last visit: 3/6/2024  Date of next visit (if applicable):9/6/2024  Date of last refill: 9/6/23  Pharmacy Name: Abiola Campuzano RN

## 2024-03-10 RX ORDER — FAMOTIDINE 10 MG/ML
20 INJECTION, SOLUTION INTRAVENOUS
OUTPATIENT
Start: 2024-03-11

## 2024-03-10 RX ORDER — SODIUM CHLORIDE 9 MG/ML
INJECTION, SOLUTION INTRAVENOUS CONTINUOUS
OUTPATIENT
Start: 2024-03-11

## 2024-03-10 RX ORDER — ALBUTEROL SULFATE 90 UG/1
4 AEROSOL, METERED RESPIRATORY (INHALATION) PRN
OUTPATIENT
Start: 2024-03-11

## 2024-03-10 RX ORDER — DIPHENHYDRAMINE HYDROCHLORIDE 50 MG/ML
50 INJECTION INTRAMUSCULAR; INTRAVENOUS
OUTPATIENT
Start: 2024-03-11

## 2024-03-10 RX ORDER — EPINEPHRINE 1 MG/ML
0.3 INJECTION, SOLUTION, CONCENTRATE INTRAVENOUS PRN
OUTPATIENT
Start: 2024-03-11

## 2024-03-10 RX ORDER — ONDANSETRON 2 MG/ML
8 INJECTION INTRAMUSCULAR; INTRAVENOUS
OUTPATIENT
Start: 2024-03-11

## 2024-03-10 RX ORDER — ACETAMINOPHEN 325 MG/1
650 TABLET ORAL
OUTPATIENT
Start: 2024-03-11

## 2024-03-30 ENCOUNTER — APPOINTMENT (OUTPATIENT)
Dept: GENERAL RADIOLOGY | Age: 79
End: 2024-03-30
Payer: MEDICARE

## 2024-03-30 ENCOUNTER — HOSPITAL ENCOUNTER (EMERGENCY)
Age: 79
Discharge: HOME | End: 2024-03-30
Attending: EMERGENCY MEDICINE
Payer: MEDICARE

## 2024-03-30 VITALS
DIASTOLIC BLOOD PRESSURE: 97 MMHG | SYSTOLIC BLOOD PRESSURE: 158 MMHG | WEIGHT: 260 LBS | TEMPERATURE: 98.1 F | BODY MASS INDEX: 43.32 KG/M2 | OXYGEN SATURATION: 97 % | HEIGHT: 65 IN | RESPIRATION RATE: 19 BRPM | HEART RATE: 79 BPM

## 2024-03-30 DIAGNOSIS — U07.1 COVID-19: Primary | ICD-10-CM

## 2024-03-30 DIAGNOSIS — R07.81 PLEURITIC PAIN: ICD-10-CM

## 2024-03-30 LAB
ANION GAP SERPL CALC-SCNC: 14 MEQ/L (ref 8–16)
BASOPHILS ABSOLUTE: 0 THOU/MM3 (ref 0–0.1)
BASOPHILS NFR BLD AUTO: 0.4 %
BUN SERPL-MCNC: 10 MG/DL (ref 7–22)
CALCIUM SERPL-MCNC: 9.3 MG/DL (ref 8.5–10.5)
CHLORIDE SERPL-SCNC: 103 MEQ/L (ref 98–111)
CO2 SERPL-SCNC: 20 MEQ/L (ref 23–33)
CREAT SERPL-MCNC: 0.8 MG/DL (ref 0.4–1.2)
D DIMER PPP IA.FEU-MCNC: 439 NG/ML FEU (ref 0–500)
DEPRECATED RDW RBC AUTO: 44.6 FL (ref 35–45)
EKG ATRIAL RATE: 79 BPM
EKG ATRIAL RATE: 84 BPM
EKG P AXIS: -10 DEGREES
EKG P AXIS: 7 DEGREES
EKG P-R INTERVAL: 188 MS
EKG P-R INTERVAL: 188 MS
EKG Q-T INTERVAL: 362 MS
EKG Q-T INTERVAL: 386 MS
EKG QRS DURATION: 100 MS
EKG QRS DURATION: 94 MS
EKG QTC CALCULATION (BAZETT): 427 MS
EKG QTC CALCULATION (BAZETT): 442 MS
EKG R AXIS: -23 DEGREES
EKG R AXIS: 9 DEGREES
EKG T AXIS: -27 DEGREES
EKG T AXIS: -6 DEGREES
EKG VENTRICULAR RATE: 79 BPM
EKG VENTRICULAR RATE: 84 BPM
EOSINOPHIL NFR BLD AUTO: 0.6 %
EOSINOPHILS ABSOLUTE: 0 THOU/MM3 (ref 0–0.4)
ERYTHROCYTE [DISTWIDTH] IN BLOOD BY AUTOMATED COUNT: 12.8 % (ref 11.5–14.5)
FLUAV RNA RESP QL NAA+PROBE: NOT DETECTED
FLUBV RNA RESP QL NAA+PROBE: NOT DETECTED
GFR SERPL CREATININE-BSD FRML MDRD: 76 ML/MIN/1.73M2
GLUCOSE SERPL-MCNC: 100 MG/DL (ref 70–108)
HCT VFR BLD AUTO: 39.2 % (ref 37–47)
HGB BLD-MCNC: 13.2 GM/DL (ref 12–16)
IMM GRANULOCYTES # BLD AUTO: 0.02 THOU/MM3 (ref 0–0.07)
IMM GRANULOCYTES NFR BLD AUTO: 0.4 %
LYMPHOCYTES ABSOLUTE: 0.8 THOU/MM3 (ref 1–4.8)
LYMPHOCYTES NFR BLD AUTO: 16.3 %
MCH RBC QN AUTO: 32.3 PG (ref 26–33)
MCHC RBC AUTO-ENTMCNC: 33.7 GM/DL (ref 32.2–35.5)
MCV RBC AUTO: 95.8 FL (ref 81–99)
MONOCYTES ABSOLUTE: 0.3 THOU/MM3 (ref 0.4–1.3)
MONOCYTES NFR BLD AUTO: 6.1 %
NEUTROPHILS NFR BLD AUTO: 76.2 %
NRBC BLD AUTO-RTO: 0 /100 WBC
NT-PROBNP SERPL IA-MCNC: 628.8 PG/ML (ref 0–449)
OSMOLALITY SERPL CALC.SUM OF ELEC: 272.9 MOSMOL/KG (ref 275–300)
PLATELET # BLD AUTO: 160 THOU/MM3 (ref 130–400)
PMV BLD AUTO: 10.2 FL (ref 9.4–12.4)
POTASSIUM SERPL-SCNC: 4 MEQ/L (ref 3.5–5.2)
RBC # BLD AUTO: 4.09 MILL/MM3 (ref 4.2–5.4)
SARS-COV-2 RNA RESP QL NAA+PROBE: DETECTED
SEGMENTED NEUTROPHILS ABSOLUTE COUNT: 4 THOU/MM3 (ref 1.8–7.7)
SODIUM SERPL-SCNC: 137 MEQ/L (ref 135–145)
TROPONIN, HIGH SENSITIVITY: 11 NG/L (ref 0–12)
TROPONIN, HIGH SENSITIVITY: 11 NG/L (ref 0–12)
WBC # BLD AUTO: 5.2 THOU/MM3 (ref 4.8–10.8)

## 2024-03-30 PROCEDURE — 94761 N-INVAS EAR/PLS OXIMETRY MLT: CPT

## 2024-03-30 PROCEDURE — 87636 SARSCOV2 & INF A&B AMP PRB: CPT

## 2024-03-30 PROCEDURE — 80048 BASIC METABOLIC PNL TOTAL CA: CPT

## 2024-03-30 PROCEDURE — 6370000000 HC RX 637 (ALT 250 FOR IP)

## 2024-03-30 PROCEDURE — 99285 EMERGENCY DEPT VISIT HI MDM: CPT

## 2024-03-30 PROCEDURE — 99215 OFFICE O/P EST HI 40 MIN: CPT

## 2024-03-30 PROCEDURE — 93005 ELECTROCARDIOGRAM TRACING: CPT | Performed by: EMERGENCY MEDICINE

## 2024-03-30 PROCEDURE — 83880 ASSAY OF NATRIURETIC PEPTIDE: CPT

## 2024-03-30 PROCEDURE — 6370000000 HC RX 637 (ALT 250 FOR IP): Performed by: EMERGENCY MEDICINE

## 2024-03-30 PROCEDURE — 36415 COLL VENOUS BLD VENIPUNCTURE: CPT

## 2024-03-30 PROCEDURE — 71045 X-RAY EXAM CHEST 1 VIEW: CPT

## 2024-03-30 PROCEDURE — 94640 AIRWAY INHALATION TREATMENT: CPT

## 2024-03-30 PROCEDURE — 93010 ELECTROCARDIOGRAM REPORT: CPT | Performed by: INTERNAL MEDICINE

## 2024-03-30 PROCEDURE — 84484 ASSAY OF TROPONIN QUANT: CPT

## 2024-03-30 PROCEDURE — 93005 ELECTROCARDIOGRAM TRACING: CPT

## 2024-03-30 PROCEDURE — 85025 COMPLETE CBC W/AUTO DIFF WBC: CPT

## 2024-03-30 PROCEDURE — 85379 FIBRIN DEGRADATION QUANT: CPT

## 2024-03-30 RX ORDER — OXYMETAZOLINE HYDROCHLORIDE 0.05 G/100ML
1 SPRAY NASAL 2 TIMES DAILY
Status: DISCONTINUED | OUTPATIENT
Start: 2024-03-30 | End: 2024-03-30 | Stop reason: HOSPADM

## 2024-03-30 RX ORDER — MAGNESIUM 30 MG
30 TABLET ORAL 2 TIMES DAILY
COMMUNITY

## 2024-03-30 RX ORDER — ACETAMINOPHEN 500 MG
1000 TABLET ORAL ONCE
Status: COMPLETED | OUTPATIENT
Start: 2024-03-30 | End: 2024-03-30

## 2024-03-30 RX ORDER — ASPIRIN 81 MG/1
324 TABLET, CHEWABLE ORAL ONCE
Status: COMPLETED | OUTPATIENT
Start: 2024-03-30 | End: 2024-03-30

## 2024-03-30 RX ORDER — IPRATROPIUM BROMIDE AND ALBUTEROL SULFATE 2.5; .5 MG/3ML; MG/3ML
1 SOLUTION RESPIRATORY (INHALATION) ONCE
Status: COMPLETED | OUTPATIENT
Start: 2024-03-30 | End: 2024-03-30

## 2024-03-30 RX ORDER — ACETAMINOPHEN AND CHLORPHENIRAMINE MALEATE 325; 2 MG/1; MG/1
1 TABLET, FILM COATED ORAL EVERY 6 HOURS PRN
Qty: 20 TABLET | Refills: 0 | Status: SHIPPED | OUTPATIENT
Start: 2024-03-30 | End: 2024-04-06

## 2024-03-30 RX ORDER — ALBUTEROL SULFATE 90 UG/1
2 AEROSOL, METERED RESPIRATORY (INHALATION) 4 TIMES DAILY PRN
Qty: 18 G | Refills: 0 | Status: SHIPPED | OUTPATIENT
Start: 2024-03-30

## 2024-03-30 RX ADMIN — IPRATROPIUM BROMIDE AND ALBUTEROL SULFATE 1 DOSE: .5; 3 SOLUTION RESPIRATORY (INHALATION) at 13:08

## 2024-03-30 RX ADMIN — ACETAMINOPHEN 1000 MG: 500 TABLET, FILM COATED ORAL at 14:22

## 2024-03-30 RX ADMIN — ASPIRIN 81 MG 324 MG: 81 TABLET ORAL at 11:34

## 2024-03-30 RX ADMIN — OXYMETAZOLINE HCL 1 SPRAY: 0.05 SPRAY NASAL at 16:34

## 2024-03-30 ASSESSMENT — PAIN DESCRIPTION - LOCATION: LOCATION: CHEST

## 2024-03-30 ASSESSMENT — ENCOUNTER SYMPTOMS
SHORTNESS OF BREATH: 0
SORE THROAT: 1
COUGH: 1

## 2024-03-30 ASSESSMENT — PAIN DESCRIPTION - ORIENTATION: ORIENTATION: LEFT

## 2024-03-30 ASSESSMENT — PAIN SCALES - GENERAL: PAINLEVEL_OUTOF10: 6

## 2024-03-30 ASSESSMENT — PAIN - FUNCTIONAL ASSESSMENT
PAIN_FUNCTIONAL_ASSESSMENT: 0-10
PAIN_FUNCTIONAL_ASSESSMENT: NONE - DENIES PAIN

## 2024-03-30 ASSESSMENT — PAIN DESCRIPTION - PAIN TYPE: TYPE: ACUTE PAIN

## 2024-03-30 ASSESSMENT — PAIN DESCRIPTION - DESCRIPTORS: DESCRIPTORS: STABBING

## 2024-03-30 NOTE — ED NOTES
Patient called this evening after picking up her medications at the pharmacy.  The pharmacist mentioned that she should check with her prescriber because of her losartan.  I reviewed her medications and compared them to the list on the NIH website of drug drug interactions.  The losartan is okay to take with the Paxlovid however her primidone is not.  After reviewing his medications and the list, I advised the patient not to take the Paxlovid.  Encouraged her to continue her vitamin D vitamin C and zinc regimen.  I also advised her to call her PCP on Monday to discuss any further treatment.  Patient verbalized understanding     Grace Lockwood, MARCELLO - CNP  03/30/24 1936      Spoke to DR. EDMONDSON about the patient.  Recommended that since patient takes Primidone for essential tremor and not seizures, she can stop primidone x 24 hours.  Take paxlovid then resume after 72 hours of completion of paxlovid.  Left voice mail for patient to call me back.       Grace Lockwood APRN - CNP  03/30/24 1959

## 2024-03-30 NOTE — DISCHARGE INSTRUCTIONS
Return to the ED if you have any new or changing symptoms such as chest pain,, breathing, unable to tolerate oral medicines, persistent fevers, difficulty breathing, or you have any other concerns.    Continue using Afrin 1 spray in each nostril twice a day for the next 3 days.

## 2024-03-30 NOTE — ED TRIAGE NOTES
To room via ambulation. Gait steady. Pt states she started with cold symptoms 3/28/24 and tested positive for covid today 3/30/24 x 2 home tests. Pt called [facility] approximately [phone] to see if she could be seen for paxlovid prescription. Pt states as soon as she hung up with [facility] she started experiencing left upper chest pain. Describes pain as intermittent sharp and stabbing. 12 lead EKG completed and given to provider.  Granddaughter with patient.

## 2024-03-30 NOTE — ED PROVIDER NOTES
[facility] EMERGENCY DEPT  Urgent Care Encounter       CHIEF COMPLAINT       Chief Complaint   Patient presents with    Chest Pain    Positive For Covid-19       Nurses Notes reviewed and I agree except as noted in the HPI.  HISTORY OF PRESENT ILLNESS   Blayne Rubin is a 78 y.o. female who presents with complaints of chest pain that started an hour ago as well as positive for covid. Pt reports covid 19 symptoms started yesterday (cough, chest congestion, and sore throat) and she decided to take 2 at home covid tests today both which were positive. Pt reports that chest pain started shortly after taking tests. Pt reports pain in left chest that is sharp in nature and does not radiate anywhere. Pt denies any heart history. Pt reports pain 6/10 at this time.     The history is provided by the patient.       REVIEW OF SYSTEMS     Review of Systems   HENT:  Positive for congestion and sore throat.    Respiratory:  Positive for cough. Negative for shortness of breath.    Cardiovascular:  Positive for chest pain. Negative for palpitations and leg swelling.   All other systems reviewed and are negative.      PAST MEDICAL HISTORY         Diagnosis Date    Arthritis     knees bilat    COVID-19 2020    scar tissue from this in both lungs per pt    Kidney stone     Nephrolithiasis     Ovarian cancer (HCC) 04/18/2018    PONV (postoperative nausea and vomiting)     Thyroid disease     just found out that dr wants to see endocinroloisgt    Tremors of nervous system        SURGICALHISTORY     Patient  has a past surgical history that includes Tonsillectomy (1965); knee surgery (1995); Cholecystectomy, laparoscopic (08/2001); Hysterectomy, total abdominal (04/18/2018); joint replacement (Right, 2019); Lithotripsy (Left, 4/25/2023); Ureter surgery (Left, 5/2/2023); and Parathyroid gland surgery (Bilateral, 8/11/2023).    CURRENT MEDICATIONS       Previous Medications    ALBUTEROL SULFATE HFA (VENTOLIN HFA) 108 (90 BASE) MCG/ACT

## 2024-03-30 NOTE — ED NOTES
[facility] at bedside and report given. [facility] personnel instructed patient to take off mask-aware patient is covid positive. This RN had mask in place. Pt denied SOB and SPO2 on room air 100%.      Karen Reese, JULIA  03/30/24 1159       Karen Reese RN  03/30/24 1200

## 2024-03-30 NOTE — ED NOTES
Pt sitting with head of bed elevated, speaking with family member at bedside, no s/sx of distress noted. Call light within reach.

## 2024-03-30 NOTE — ED PROVIDER NOTES
[facility]  EMERGENCY DEPARTMENT ENCOUNTER          Pt Name: Blayne Rubin  MRN: [id number]  Birthdate 1945  Date of evaluation: 3/30/2024  Emergency Physician: Zachary Reese DO    CHIEF COMPLAINT   Chief Complaint   Patient presents with    Chest Pain    Positive For Covid-19        HPI   Blayne Rubin is a 78 y.o. female who presents with chest pain without palpitations, onset was 2 days ago. The pain is described as sharp and congested sensation and is rated at 6. The pain is sharp it is left sided and it does not radiates. The duration has been intermittent since onset. The trigger for the pain is the patient has had uri symptoms, sore throat, cough non productive and no hemoptysis. States then started feeling chest congestion and pain yesterday. Pain is non-exertional. She reports she tested positive for covid at home.     REVIEW OF SYSTEMS   Cardiac: +Chest Pain, Denies syncope   Respiratory: Denies cough or hemoptysis   GI: Denies Vomiting or Diarrhea   General: Denies Fever   All other review of systems are reviewed and are otherwise negative.     PAST MEDICAL & SURGICAL HISTORY   Past Medical History:   Diagnosis Date    Arthritis     knees bilat    COVID-19 2020    scar tissue from this in both lungs per pt    Kidney stone     Nephrolithiasis     Ovarian cancer (HCC) 04/18/2018    PONV (postoperative nausea and vomiting)     Thyroid disease     just found out that  wants to see endocinroloisgt    Tremors of nervous system       Past Surgical History:   Procedure Laterality Date    CHOLECYSTECTOMY, LAPAROSCOPIC  08/2001    HYSTERECTOMY, TOTAL ABDOMINAL (CERVIX REMOVED)  04/18/2018    JOINT REPLACEMENT Right 2019    knee    KNEE SURGERY  1995    arthroscopic    LITHOTRIPSY Left 4/25/2023    Left ESWL EXTRACORPOREAL SHOCK WAVE LITHOTRIPSY Left insertion performed by Cosmo Mendez MD at [facility] OR    PARATHYROID GLAND SURGERY Bilateral 8/11/2023    BILATRAL  - Potential duplicate medications found. Please discuss with provider.               FINAL DISPOSITION     Final diagnoses:   Chest pain, unspecified type   COVID-19     Condition: condition: good  Dispo: Discharge to home    PATIENT REFERRED TO:  Allison Mckay MD  [address] [address]  [phone]    Schedule an appointment as soon as possible for a visit in 3 days        Critical Care Time   CRITICAL CARE:  none    PROCEDURES: (None if blank)  Procedures:       This transcription was electronically signed. Parts of this transcriptions may have been dictated by use of voice recognition software and electronically transcribed, and parts may have been transcribed with the assistance of an ED scribe. The transcription may contain errors not detected in proofreading.    Electronically Signed: Zachary Reese DO, 03/30/24, 12:43 PM      Zachary Reese DO  04/01/24 2361

## 2024-04-01 ENCOUNTER — CARE COORDINATION (OUTPATIENT)
Dept: CARE COORDINATION | Age: 79
End: 2024-04-01

## 2024-04-01 ENCOUNTER — TELEPHONE (OUTPATIENT)
Dept: FAMILY MEDICINE CLINIC | Age: 79
End: 2024-04-01

## 2024-04-01 NOTE — TELEPHONE ENCOUNTER
She should not take primidone with the Paxlovid.    Recommend taking Paxlovid  She can also discuss medication interactions with her local pharmacist    Please advise patient.  Pawel Ch MD

## 2024-04-01 NOTE — TELEPHONE ENCOUNTER
Discussed with patient  States she is feeling somewhat better  Denies fever or SOB  States she has difficulty with ADLs without the primidone due to tremors  Based on the fact that she is on day 4 of her illness and improving, she is going to hold off on paxlovid and continue primidone  She was instructed to drink plenty of water and call if symptoms change or progress  She verbalized understanding

## 2024-04-01 NOTE — CARE COORDINATION
their healthcare.    Reviewed and educated patient on any new and changed medications related to discharge diagnosis     Was patient discharged with a pulse oximeter? no      ACM provided contact information. Plan for follow-up call in 5-7 days based on severity of symptoms and risk factors.   ED prescribed paxlovid, coricidin, albuterol  Did not start paxlovid due to primidone per pharmacy advice  Advised on zone sheet, symptom management, reporting worsening symptoms, deep breathing exercises, staying active, and hydration.

## 2024-04-01 NOTE — TELEPHONE ENCOUNTER
Patient called in stated she was in the ER at [facility] and tested positive for COVID. She was proscribed Paxlovid, but was advised that she is not able to take with her other medications. Patient is wanting to know if there is any other antiviral she is able to take.

## 2024-04-04 LAB
EKG ATRIAL RATE: 79 BPM
EKG ATRIAL RATE: 84 BPM
EKG P AXIS: -10 DEGREES
EKG P AXIS: 7 DEGREES
EKG P-R INTERVAL: 188 MS
EKG P-R INTERVAL: 188 MS
EKG Q-T INTERVAL: 362 MS
EKG Q-T INTERVAL: 386 MS
EKG QRS DURATION: 100 MS
EKG QRS DURATION: 94 MS
EKG QTC CALCULATION (BAZETT): 427 MS
EKG QTC CALCULATION (BAZETT): 442 MS
EKG R AXIS: -23 DEGREES
EKG R AXIS: 9 DEGREES
EKG T AXIS: -27 DEGREES
EKG T AXIS: -6 DEGREES
EKG VENTRICULAR RATE: 79 BPM
EKG VENTRICULAR RATE: 84 BPM

## 2024-04-08 ENCOUNTER — CARE COORDINATION (OUTPATIENT)
Dept: CARE COORDINATION | Age: 79
End: 2024-04-08

## 2024-04-08 NOTE — CARE COORDINATION
You Patient resolved from the Care Transitions episode on 4/8/2024  Discussed COVID-19 related testing which was available at this time. Test results were positive. Patient informed of results, if available? Yes    Patient/family has been provided the following resources and education related to COVID-19:                         Signs, symptoms and red flags related to COVID-19            CDC exposure and quarantine guidelines            [employer] exposure contact - [phone]            Contact for their local Department of Health                 Patient currently reports that the following symptoms have improved:  no new/worsening symptoms     No further outreach scheduled with this CTN/ACM.  Episode of Care resolved.  Patient has this CTN/ACM contact information if future needs arise.

## 2024-04-19 ENCOUNTER — TELEPHONE (OUTPATIENT)
Dept: FAMILY MEDICINE CLINIC | Age: 79
End: 2024-04-19

## 2024-04-19 ENCOUNTER — OFFICE VISIT (OUTPATIENT)
Dept: FAMILY MEDICINE CLINIC | Age: 79
End: 2024-04-19

## 2024-04-19 VITALS
OXYGEN SATURATION: 96 % | BODY MASS INDEX: 44.43 KG/M2 | RESPIRATION RATE: 16 BRPM | HEART RATE: 75 BPM | HEIGHT: 64 IN | DIASTOLIC BLOOD PRESSURE: 62 MMHG | TEMPERATURE: 96.7 F | SYSTOLIC BLOOD PRESSURE: 132 MMHG | WEIGHT: 260.25 LBS

## 2024-04-19 DIAGNOSIS — R06.02 PERSISTENT SHORTNESS OF BREATH AFTER COVID-19: ICD-10-CM

## 2024-04-19 DIAGNOSIS — U09.9 PERSISTENT SHORTNESS OF BREATH AFTER COVID-19: ICD-10-CM

## 2024-04-19 DIAGNOSIS — I50.22 CHRONIC SYSTOLIC (CONGESTIVE) HEART FAILURE (HCC): ICD-10-CM

## 2024-04-19 DIAGNOSIS — I77.9 CAROTID ARTERY DISORDER (HCC): ICD-10-CM

## 2024-04-19 DIAGNOSIS — M81.0 AGE-RELATED OSTEOPOROSIS WITHOUT CURRENT PATHOLOGICAL FRACTURE: Primary | ICD-10-CM

## 2024-04-19 DIAGNOSIS — E66.01 OBESITY, CLASS III, BMI 40-49.9 (MORBID OBESITY) (HCC): ICD-10-CM

## 2024-04-19 RX ORDER — ALBUTEROL SULFATE 90 UG/1
2 AEROSOL, METERED RESPIRATORY (INHALATION) 4 TIMES DAILY PRN
Qty: 18 G | Refills: 1 | Status: SHIPPED | OUTPATIENT
Start: 2024-04-19

## 2024-04-19 ASSESSMENT — ENCOUNTER SYMPTOMS
SHORTNESS OF BREATH: 0
SHORTNESS OF BREATH: 1
ABDOMINAL PAIN: 0
DIARRHEA: 0
NAUSEA: 0
BLOOD IN STOOL: 0
RHINORRHEA: 0
VOMITING: 0
COUGH: 0

## 2024-04-19 NOTE — PROGRESS NOTES
Attending Physician Statement  I have discussed the case, including pertinent history and exam findings with the resident. I also have seen the patient and performed key portions of the examination. I agree with the documented assessment and plan as documented by the resident.      Blayne Rubin (:  1945) is a 78 y.o. female,Established patient, here for evaluation of the following chief complaint(s):  Osteoporosis (Patient would like to discuss the Prolia shot or other options. )      Assessment & Plan   ASSESSMENT/PLAN:  1. Age-related osteoporosis without current pathological fracture  2. Persistent shortness of breath after COVID-19  -     Echo (TTE) complete (PRN contrast/bubble/strain/3D); Future  -     Ember Edwards MD, Cardiology, [place]  3. Chronic systolic (congestive) heart failure (HCC)  -     Echo (TTE) complete (PRN contrast/bubble/strain/3D); Future  -     Ember Edwards MD, Cardiology, [place]  4. Carotid artery disorder (HCC)  5. 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 Will proceed with infusion. Staff notified directly.   SOB since covid19 with h/o cardiomyopathy/CHF  Statin therapy to be considered    Return in about 6 months (around 10/19/2024).         Subjective   SUBJECTIVE/OBJECTIVE:  HPI    Covid19 over Easter. Some SOB with exertion.   echo 2021  Ejection fraction is visually estimated at 45%.   There was mild global hypokinesis of the left ventricle.   Aortic valve appears tricuspid.   Aortic valve leaflets are somewhat thickened.   Trivial aortic regurgitation is noted.     Willing to do prolia.  Reviewed options again.  Therapy plan in place. Unclear why staff did not see message to start infusion scheduling.      Review of Systems   Constitutional:  Negative for fatigue and fever.   Respiratory:  Negative for shortness of breath.    Cardiovascular:  Negative for chest pain and leg swelling.      No results found for: \"BNP\"  Lab

## 2024-04-19 NOTE — TELEPHONE ENCOUNTER
I don't have other dx codes. Patient doesn't have stroke symptoms. She was told by a specialist that her carotid was a bit tortuous and should be checked.  We don't have that information in any of her studies.  I used what I had.    Please let patient know that her insurance is not wanting to cover her carotid US as she is not having stroke like symptoms.  She may want to invest in a lifeline vascular screening which is a self-pay/referral item.

## 2024-04-19 NOTE — TELEPHONE ENCOUNTER
Cira is calling to get an updated order for this patient. Also the diagnoses code needs changed. This was scheduled back in August and the patient is trying to get all of her tests scheduled. Please call Cira when order is in so that she can call the patient to get her scheduled.

## 2024-04-19 NOTE — PROGRESS NOTES
[facility] PROFESSIONAL [facility] MEDICINE PRACTICE  [address]  [facility] [address]  Dept: [phone]  Dept Fax: [phone]  Loc: [phone]    HPI:     Blayne Rubin is a 78 y.o. female who presents today for:  Chief Complaint   Patient presents with    Osteoporosis     Patient would like to discuss the Prolia shot or other options.      Patient wants to discuss the medication for osteoporosis. Had DEXA scan - was diagnosed by Dr. Teresa in [place]. Stated she wanted to think about Prolia injection. Needs refill for albuterol - was using it when she had Covid during Easter. Has SOB with exercise.     Health Maintenance Topics with due status: Overdue       Topic Date Due    Hepatitis C screen Never done    DTaP/Tdap/Td vaccine Never done    Shingles vaccine Never done    Respiratory Syncytial Virus (RSV) Pregnant or age 60 yrs+ Never done    COVID-19 Vaccine 09/01/2023     Review of Systems   Constitutional:  Negative for chills and fever.   HENT:  Negative for rhinorrhea and sneezing.    Respiratory:  Positive for shortness of breath. Negative for cough.    Cardiovascular:  Negative for chest pain and palpitations.   Gastrointestinal:  Negative for abdominal pain, blood in stool, diarrhea, nausea and vomiting.   Genitourinary:  Negative for hematuria.   Skin:  Negative for rash.   Neurological:  Negative for dizziness, light-headedness and headaches.   All other systems reviewed and are negative.      Past Medical History:          Diagnosis Date    Arthritis     knees bilat    COVID-19 2020    scar tissue from this in both lungs per pt    Kidney stone     Nephrolithiasis     Ovarian cancer (HCC) 04/18/2018    PONV (postoperative nausea and vomiting)     Thyroid disease     just found out that  wants to see endocinroloisgt    Tremors of nervous system        Past Surgical History:          Procedure Laterality Date    CHOLECYSTECTOMY, LAPAROSCOPIC  08/2001

## 2024-04-29 ENCOUNTER — HOSPITAL ENCOUNTER (OUTPATIENT)
Age: 79
Discharge: HOME | End: 2024-05-01
Attending: FAMILY MEDICINE
Payer: MEDICARE

## 2024-04-29 ENCOUNTER — HOSPITAL ENCOUNTER (OUTPATIENT)
Dept: NURSING | Age: 79
Discharge: HOME | End: 2024-04-29
Attending: FAMILY MEDICINE
Payer: MEDICARE

## 2024-04-29 VITALS
TEMPERATURE: 97 F | DIASTOLIC BLOOD PRESSURE: 77 MMHG | HEART RATE: 67 BPM | SYSTOLIC BLOOD PRESSURE: 168 MMHG | OXYGEN SATURATION: 96 % | RESPIRATION RATE: 16 BRPM

## 2024-04-29 DIAGNOSIS — M81.0 AGE-RELATED OSTEOPOROSIS WITHOUT CURRENT PATHOLOGICAL FRACTURE: Primary | ICD-10-CM

## 2024-04-29 DIAGNOSIS — U09.9 PERSISTENT SHORTNESS OF BREATH AFTER COVID-19: ICD-10-CM

## 2024-04-29 DIAGNOSIS — R06.02 PERSISTENT SHORTNESS OF BREATH AFTER COVID-19: ICD-10-CM

## 2024-04-29 DIAGNOSIS — I50.22 CHRONIC SYSTOLIC (CONGESTIVE) HEART FAILURE (HCC): ICD-10-CM

## 2024-04-29 LAB
ECHO AO ASC DIAM: 3.2 CM
ECHO AR MAX VEL PISA: 3.1 M/S
ECHO AV CUSP MM: 1.8 CM
ECHO AV PEAK GRADIENT: 6 MMHG
ECHO AV PEAK VELOCITY: 1.2 M/S
ECHO AV REGURGITANT PHT: 639 MS
ECHO AV VELOCITY RATIO: 0.75
ECHO EST RA PRESSURE: 5 MMHG
ECHO LA AREA 2C: 14.2 CM2
ECHO LA AREA 4C: 10.8 CM2
ECHO LA DIAMETER: 4.3 CM
ECHO LA MAJOR AXIS: 4.1 CM
ECHO LA MINOR AXIS: 4.8 CM
ECHO LA VOL BP: 30 ML (ref 22–52)
ECHO LA VOL MOD A2C: 33 ML (ref 22–52)
ECHO LA VOL MOD A4C: 23 ML (ref 22–52)
ECHO LV E' LATERAL VELOCITY: 5 CM/S
ECHO LV E' SEPTAL VELOCITY: 5 CM/S
ECHO LV FRACTIONAL SHORTENING: 26 % (ref 28–44)
ECHO LV INTERNAL DIMENSION DIASTOLIC: 5.3 CM (ref 3.9–5.3)
ECHO LV INTERNAL DIMENSION SYSTOLIC: 3.9 CM
ECHO LV IVSD: 1 CM (ref 0.6–0.9)
ECHO LV MASS 2D: 200.4 G (ref 67–162)
ECHO LV POSTERIOR WALL DIASTOLIC: 1 CM (ref 0.6–0.9)
ECHO LV RELATIVE WALL THICKNESS RATIO: 0.38
ECHO LVOT PEAK GRADIENT: 3 MMHG
ECHO LVOT PEAK VELOCITY: 0.9 M/S
ECHO MV A VELOCITY: 0.84 M/S
ECHO MV E DECELERATION TIME (DT): 447 MS
ECHO MV E VELOCITY: 0.59 M/S
ECHO MV E/A RATIO: 0.7
ECHO MV E/E' LATERAL: 11.8
ECHO MV E/E' RATIO (AVERAGED): 11.8
ECHO MV REGURGITANT PEAK GRADIENT: 88 MMHG
ECHO MV REGURGITANT PEAK VELOCITY: 4.7 M/S
ECHO PULMONARY ARTERY END DIASTOLIC PRESSURE: 4 MMHG
ECHO PV MAX VELOCITY: 0.6 M/S
ECHO PV PEAK GRADIENT: 1 MMHG
ECHO PV REGURGITANT MAX VELOCITY: 1 M/S
ECHO RIGHT VENTRICULAR SYSTOLIC PRESSURE (RVSP): 25 MMHG
ECHO RV INTERNAL DIMENSION: 2.9 CM
ECHO RV TAPSE: 2.4 CM (ref 1.7–?)
ECHO TV E WAVE: 0.4 M/S
ECHO TV REGURGITANT MAX VELOCITY: 2.23 M/S
ECHO TV REGURGITANT PEAK GRADIENT: 20 MMHG

## 2024-04-29 PROCEDURE — 6360000002 HC RX W HCPCS: Performed by: FAMILY MEDICINE

## 2024-04-29 PROCEDURE — 93306 TTE W/DOPPLER COMPLETE: CPT

## 2024-04-29 PROCEDURE — 93306 TTE W/DOPPLER COMPLETE: CPT | Performed by: NUCLEAR MEDICINE

## 2024-04-29 PROCEDURE — 96372 THER/PROPH/DIAG INJ SC/IM: CPT

## 2024-04-29 RX ORDER — DIPHENHYDRAMINE HYDROCHLORIDE 50 MG/ML
50 INJECTION INTRAMUSCULAR; INTRAVENOUS
OUTPATIENT
Start: 2024-10-27

## 2024-04-29 RX ORDER — ACETAMINOPHEN 325 MG/1
650 TABLET ORAL
OUTPATIENT
Start: 2024-10-27

## 2024-04-29 RX ORDER — EPINEPHRINE 1 MG/ML
0.3 INJECTION, SOLUTION INTRAMUSCULAR; SUBCUTANEOUS PRN
OUTPATIENT
Start: 2024-10-27

## 2024-04-29 RX ORDER — SODIUM CHLORIDE 9 MG/ML
INJECTION, SOLUTION INTRAVENOUS CONTINUOUS
OUTPATIENT
Start: 2024-10-27

## 2024-04-29 RX ORDER — ALBUTEROL SULFATE 90 UG/1
4 AEROSOL, METERED RESPIRATORY (INHALATION) PRN
OUTPATIENT
Start: 2024-10-27

## 2024-04-29 RX ORDER — ONDANSETRON 2 MG/ML
8 INJECTION INTRAMUSCULAR; INTRAVENOUS
OUTPATIENT
Start: 2024-10-27

## 2024-04-29 RX ADMIN — DENOSUMAB 60 MG: 60 INJECTION SUBCUTANEOUS at 12:04

## 2024-04-29 NOTE — DISCHARGE INSTRUCTIONS
Prolia injection discharge instructions:    Side effects: headache, joint pain, rash, swelling    This medication is given every 6 months. We will need a new order before we schedule your next one due around:     October 30th or after  [phone] call and schedule at [facility]    Please call [phone] with a any questions or concerns.

## 2024-04-29 NOTE — PROGRESS NOTES
1215- Patient arrived to [facility] ambulatory with  for Prolia injection. Patient states she has never received before. Medication explained to patient. Vitals obtained. Call light placed within reach. PT RIGHTS AND RESPONSIBILITIES OFFERED TO PT.    1204- Injection given. Patient tolerated well with no issues. Patient to be monitored post injection.     1220- Patient states she feels fine to go home. Discharge instructions reviewed with patient and . Verbalized understanding with no further questions. AVS provided. Discharged ambulatory to [facility] in stable condition.               __M__ Safety:       (Environmental)  [place] to environment  Ensure ID band is correct and in place/ allergy band as needed  Assess for fall risk  Initiate fall precautions as applicable (fall band, side rails, etc.)  Call light within reach  Bed in low position/ wheels locked    __M__ Pain:       Assess pain level and characteristics  Administer analgesics as ordered  Assess effectiveness of pain management and report to MD as needed    __M__ Knowledge Deficit:  Assess baseline knowledge  Provide teaching at level of understanding  Provide teaching via preferred learning method  Evaluate teaching effectiveness    __M__ Hemodynamic/Respiratory Status:       (Pre and Post Procedure Monitoring)  Assess/Monitor vital signs and LOC  Assess Baseline SpO2 prior to any sedation  Obtain weight/height  Assess vital signs/ LOC until patient meets discharge criteria  Monitor procedure site and notify MD of any issues

## 2024-05-02 ENCOUNTER — OFFICE VISIT (OUTPATIENT)
Dept: CARDIOLOGY CLINIC | Age: 79
End: 2024-05-02
Payer: MEDICARE

## 2024-05-02 VITALS
SYSTOLIC BLOOD PRESSURE: 142 MMHG | WEIGHT: 256 LBS | DIASTOLIC BLOOD PRESSURE: 80 MMHG | HEART RATE: 72 BPM | HEIGHT: 65 IN | BODY MASS INDEX: 42.65 KG/M2

## 2024-05-02 DIAGNOSIS — I42.0 DILATED CARDIOMYOPATHY (HCC): Primary | ICD-10-CM

## 2024-05-02 DIAGNOSIS — I10 PRIMARY HYPERTENSION: ICD-10-CM

## 2024-05-02 DIAGNOSIS — R06.09 DYSPNEA ON EXERTION: ICD-10-CM

## 2024-05-02 PROCEDURE — 1036F TOBACCO NON-USER: CPT | Performed by: NUCLEAR MEDICINE

## 2024-05-02 PROCEDURE — 3077F SYST BP >= 140 MM HG: CPT | Performed by: NUCLEAR MEDICINE

## 2024-05-02 PROCEDURE — 1090F PRES/ABSN URINE INCON ASSESS: CPT | Performed by: NUCLEAR MEDICINE

## 2024-05-02 PROCEDURE — 99214 OFFICE O/P EST MOD 30 MIN: CPT | Performed by: NUCLEAR MEDICINE

## 2024-05-02 PROCEDURE — 1124F ACP DISCUSS-NO DSCNMKR DOCD: CPT | Performed by: NUCLEAR MEDICINE

## 2024-05-02 PROCEDURE — G8417 CALC BMI ABV UP PARAM F/U: HCPCS | Performed by: NUCLEAR MEDICINE

## 2024-05-02 PROCEDURE — 3079F DIAST BP 80-89 MM HG: CPT | Performed by: NUCLEAR MEDICINE

## 2024-05-02 PROCEDURE — G8399 PT W/DXA RESULTS DOCUMENT: HCPCS | Performed by: NUCLEAR MEDICINE

## 2024-05-02 PROCEDURE — G8427 DOCREV CUR MEDS BY ELIG CLIN: HCPCS | Performed by: NUCLEAR MEDICINE

## 2024-05-02 NOTE — PROGRESS NOTES
New patient referred for CHF and SOB.  Patient states her PCP is concerned with her heart beating too slow?  
Follow up as directed.    Electronically signedby Ember Sanz MD on 5/2/2024 at 9:41 AM

## 2024-05-03 DIAGNOSIS — G24.3 CERVICAL DYSTONIA: ICD-10-CM

## 2024-05-03 DIAGNOSIS — G25.0 BENIGN ESSENTIAL TREMOR: ICD-10-CM

## 2024-05-03 RX ORDER — PRIMIDONE 50 MG/1
TABLET ORAL
Qty: 270 TABLET | Refills: 3 | Status: SHIPPED | OUTPATIENT
Start: 2024-05-03

## 2024-05-16 ENCOUNTER — HOSPITAL ENCOUNTER (OUTPATIENT)
Dept: NUCLEAR MEDICINE | Age: 79
Discharge: HOME | End: 2024-05-16
Attending: NUCLEAR MEDICINE
Payer: MEDICARE

## 2024-05-16 ENCOUNTER — HOSPITAL ENCOUNTER (OUTPATIENT)
Age: 79
Discharge: HOME | End: 2024-05-18
Attending: NUCLEAR MEDICINE
Payer: MEDICARE

## 2024-05-16 DIAGNOSIS — R06.09 DYSPNEA ON EXERTION: ICD-10-CM

## 2024-05-16 DIAGNOSIS — I10 PRIMARY HYPERTENSION: ICD-10-CM

## 2024-05-16 DIAGNOSIS — I42.0 DILATED CARDIOMYOPATHY (HCC): ICD-10-CM

## 2024-05-16 LAB
NUC STRESS EJECTION FRACTION: 43 %
STRESS BASELINE DIAS BP: 63 MMHG
STRESS BASELINE HR: 66 BPM
STRESS BASELINE SYS BP: 110 MMHG
STRESS STAGE 1 BP: NORMAL MMHG
STRESS STAGE 1 DURATION: 1 MIN:SEC
STRESS STAGE 1 HR: 99 BPM
STRESS STAGE 2 BP: NORMAL MMHG
STRESS STAGE 2 DURATION: 1 MIN:SEC
STRESS STAGE 2 HR: 101 BPM
STRESS STAGE 3 BP: NORMAL MMHG
STRESS STAGE 3 COMMENTS: NORMAL
STRESS STAGE 3 DURATION: 1 MIN:SEC
STRESS STAGE 3 HR: 90 BPM
STRESS STAGE RECOVERY 1 BP: NORMAL MMHG
STRESS STAGE RECOVERY 1 DURATION: 1 MIN:SEC
STRESS STAGE RECOVERY 1 HR: 87 BPM
STRESS STAGE RECOVERY 2 BP: NORMAL MMHG
STRESS STAGE RECOVERY 2 COMMENTS: NORMAL
STRESS STAGE RECOVERY 2 DURATION: 1 MIN:SEC
STRESS STAGE RECOVERY 2 HR: 85 BPM
STRESS STAGE RECOVERY 3 BP: NORMAL MMHG
STRESS STAGE RECOVERY 3 DURATION: 1 MIN:SEC
STRESS STAGE RECOVERY 3 HR: 89 BPM
STRESS STAGE RECOVERY 4 BP: NORMAL MMHG
STRESS STAGE RECOVERY 4 COMMENTS: NORMAL
STRESS STAGE RECOVERY 4 DURATION: 2 MIN:SEC
STRESS STAGE RECOVERY 4 HR: 78 BPM
STRESS TARGET HR: 144 BPM

## 2024-05-16 PROCEDURE — 93017 CV STRESS TEST TRACING ONLY: CPT

## 2024-05-16 PROCEDURE — 78452 HT MUSCLE IMAGE SPECT MULT: CPT

## 2024-05-16 PROCEDURE — 6360000002 HC RX W HCPCS: Performed by: NUCLEAR MEDICINE

## 2024-05-16 PROCEDURE — A9500 TC99M SESTAMIBI: HCPCS | Performed by: NUCLEAR MEDICINE

## 2024-05-16 PROCEDURE — 3430000000 HC RX DIAGNOSTIC RADIOPHARMACEUTICAL: Performed by: NUCLEAR MEDICINE

## 2024-05-16 RX ORDER — TETRAKIS(2-METHOXYISOBUTYLISOCYANIDE)COPPER(I) TETRAFLUOROBORATE 1 MG/ML
8.6 INJECTION, POWDER, LYOPHILIZED, FOR SOLUTION INTRAVENOUS
Status: COMPLETED | OUTPATIENT
Start: 2024-05-16 | End: 2024-05-16

## 2024-05-16 RX ORDER — TETRAKIS(2-METHOXYISOBUTYLISOCYANIDE)COPPER(I) TETRAFLUOROBORATE 1 MG/ML
31.6 INJECTION, POWDER, LYOPHILIZED, FOR SOLUTION INTRAVENOUS
Status: COMPLETED | OUTPATIENT
Start: 2024-05-16 | End: 2024-05-16

## 2024-05-16 RX ORDER — REGADENOSON 0.08 MG/ML
0.4 INJECTION, SOLUTION INTRAVENOUS
Status: COMPLETED | OUTPATIENT
Start: 2024-05-16 | End: 2024-05-16

## 2024-05-16 RX ADMIN — REGADENOSON 0.4 MG: 0.08 INJECTION, SOLUTION INTRAVENOUS at 13:46

## 2024-05-16 RX ADMIN — Medication 8.6 MILLICURIE: at 12:55

## 2024-05-16 RX ADMIN — Medication 31.6 MILLICURIE: at 13:47

## 2024-05-17 ENCOUNTER — TELEPHONE (OUTPATIENT)
Dept: CARDIOLOGY CLINIC | Age: 79
End: 2024-05-17

## 2024-05-17 DIAGNOSIS — I50.22 CHRONIC SYSTOLIC (CONGESTIVE) HEART FAILURE (HCC): ICD-10-CM

## 2024-05-17 DIAGNOSIS — R06.09 DYSPNEA ON EXERTION: ICD-10-CM

## 2024-05-17 DIAGNOSIS — I42.0 DILATED CARDIOMYOPATHY (HCC): ICD-10-CM

## 2024-05-17 DIAGNOSIS — R94.39 ABNORMAL STRESS TEST: Primary | ICD-10-CM

## 2024-06-05 ENCOUNTER — PREP FOR PROCEDURE (OUTPATIENT)
Dept: CARDIOLOGY | Age: 79
End: 2024-06-05

## 2024-06-05 RX ORDER — SODIUM CHLORIDE 0.9 % (FLUSH) 0.9 %
5-40 SYRINGE (ML) INJECTION EVERY 12 HOURS SCHEDULED
Status: CANCELLED | OUTPATIENT
Start: 2024-06-05

## 2024-06-05 RX ORDER — ASPIRIN 325 MG
325 TABLET ORAL ONCE
Status: CANCELLED | OUTPATIENT
Start: 2024-06-05 | End: 2024-06-05

## 2024-06-05 RX ORDER — SODIUM CHLORIDE 9 MG/ML
INJECTION, SOLUTION INTRAVENOUS PRN
Status: CANCELLED | OUTPATIENT
Start: 2024-06-05

## 2024-06-05 RX ORDER — NITROGLYCERIN 0.4 MG/1
0.4 TABLET SUBLINGUAL EVERY 5 MIN PRN
Status: CANCELLED | OUTPATIENT
Start: 2024-06-05

## 2024-06-05 RX ORDER — SODIUM CHLORIDE 0.9 % (FLUSH) 0.9 %
5-40 SYRINGE (ML) INJECTION PRN
Status: CANCELLED | OUTPATIENT
Start: 2024-06-05

## 2024-06-05 RX ORDER — SODIUM CHLORIDE 9 MG/ML
INJECTION, SOLUTION INTRAVENOUS CONTINUOUS
Status: CANCELLED | OUTPATIENT
Start: 2024-06-05

## 2024-06-06 ENCOUNTER — HOSPITAL ENCOUNTER (OUTPATIENT)
Age: 79
Setting detail: OUTPATIENT SURGERY
Discharge: HOME | End: 2024-06-06
Attending: NUCLEAR MEDICINE | Admitting: NUCLEAR MEDICINE
Payer: MEDICARE

## 2024-06-06 VITALS
HEART RATE: 77 BPM | BODY MASS INDEX: 44.88 KG/M2 | SYSTOLIC BLOOD PRESSURE: 130 MMHG | WEIGHT: 262.9 LBS | OXYGEN SATURATION: 99 % | HEIGHT: 64 IN | DIASTOLIC BLOOD PRESSURE: 80 MMHG | RESPIRATION RATE: 16 BRPM | TEMPERATURE: 98.6 F

## 2024-06-06 DIAGNOSIS — R94.39 ABNORMAL STRESS TEST: ICD-10-CM

## 2024-06-06 LAB
ABO: NORMAL
ACTIVATED CLOTTING TIME: 152 SECONDS (ref 1–150)
ACTIVATED CLOTTING TIME: 244 SECONDS (ref 1–150)
ANION GAP SERPL CALC-SCNC: 10 MEQ/L (ref 8–16)
ANTIBODY SCREEN: NORMAL
APTT PPP: 31.1 SECONDS (ref 22–38)
BUN SERPL-MCNC: 12 MG/DL (ref 7–22)
CALCIUM SERPL-MCNC: 9.7 MG/DL (ref 8.5–10.5)
CHLORIDE SERPL-SCNC: 108 MEQ/L (ref 98–111)
CHOLEST SERPL-MCNC: 195 MG/DL (ref 100–199)
CO2 SERPL-SCNC: 24 MEQ/L (ref 23–33)
CREAT SERPL-MCNC: 0.8 MG/DL (ref 0.4–1.2)
DEPRECATED RDW RBC AUTO: 49.7 FL (ref 35–45)
ECHO BSA: 2.32 M2
EKG ATRIAL RATE: 60 BPM
EKG P AXIS: -6 DEGREES
EKG P-R INTERVAL: 188 MS
EKG Q-T INTERVAL: 422 MS
EKG QRS DURATION: 92 MS
EKG QTC CALCULATION (BAZETT): 422 MS
EKG R AXIS: -7 DEGREES
EKG T AXIS: -7 DEGREES
EKG VENTRICULAR RATE: 60 BPM
ERYTHROCYTE [DISTWIDTH] IN BLOOD BY AUTOMATED COUNT: 13.8 % (ref 11.5–14.5)
GFR SERPL CREATININE-BSD FRML MDRD: 76 ML/MIN/1.73M2
GLUCOSE SERPL-MCNC: 98 MG/DL (ref 70–108)
HCT VFR BLD AUTO: 38.5 % (ref 37–47)
HDLC SERPL-MCNC: 61 MG/DL
HGB BLD-MCNC: 12.9 GM/DL (ref 12–16)
INR PPP: 0.89 (ref 0.85–1.13)
LDLC SERPL CALC-MCNC: 113 MG/DL
MCH RBC QN AUTO: 33 PG (ref 26–33)
MCHC RBC AUTO-ENTMCNC: 33.5 GM/DL (ref 32.2–35.5)
MCV RBC AUTO: 98.5 FL (ref 81–99)
PLATELET # BLD AUTO: 174 THOU/MM3 (ref 130–400)
PMV BLD AUTO: 10.3 FL (ref 9.4–12.4)
POTASSIUM SERPL-SCNC: 4.3 MEQ/L (ref 3.5–5.2)
RBC # BLD AUTO: 3.91 MILL/MM3 (ref 4.2–5.4)
RH FACTOR: NORMAL
SODIUM SERPL-SCNC: 142 MEQ/L (ref 135–145)
TRIGL SERPL-MCNC: 103 MG/DL (ref 0–199)
WBC # BLD AUTO: 6.1 THOU/MM3 (ref 4.8–10.8)

## 2024-06-06 PROCEDURE — 85610 PROTHROMBIN TIME: CPT

## 2024-06-06 PROCEDURE — 7100000011 HC PHASE II RECOVERY - ADDTL 15 MIN: Performed by: NUCLEAR MEDICINE

## 2024-06-06 PROCEDURE — 6360000004 HC RX CONTRAST MEDICATION: Performed by: NUCLEAR MEDICINE

## 2024-06-06 PROCEDURE — 93458 L HRT ARTERY/VENTRICLE ANGIO: CPT | Performed by: NUCLEAR MEDICINE

## 2024-06-06 PROCEDURE — 36415 COLL VENOUS BLD VENIPUNCTURE: CPT

## 2024-06-06 PROCEDURE — 2580000003 HC RX 258: Performed by: PHYSICIAN ASSISTANT

## 2024-06-06 PROCEDURE — 93010 ELECTROCARDIOGRAM REPORT: CPT | Performed by: NUCLEAR MEDICINE

## 2024-06-06 PROCEDURE — 85027 COMPLETE CBC AUTOMATED: CPT

## 2024-06-06 PROCEDURE — 86900 BLOOD TYPING SEROLOGIC ABO: CPT

## 2024-06-06 PROCEDURE — 80061 LIPID PANEL: CPT

## 2024-06-06 PROCEDURE — 80048 BASIC METABOLIC PNL TOTAL CA: CPT

## 2024-06-06 PROCEDURE — C1894 INTRO/SHEATH, NON-LASER: HCPCS | Performed by: NUCLEAR MEDICINE

## 2024-06-06 PROCEDURE — 6360000002 HC RX W HCPCS: Performed by: NUCLEAR MEDICINE

## 2024-06-06 PROCEDURE — 6370000000 HC RX 637 (ALT 250 FOR IP): Performed by: PHYSICIAN ASSISTANT

## 2024-06-06 PROCEDURE — 99152 MOD SED SAME PHYS/QHP 5/>YRS: CPT | Performed by: NUCLEAR MEDICINE

## 2024-06-06 PROCEDURE — 2500000003 HC RX 250 WO HCPCS: Performed by: NUCLEAR MEDICINE

## 2024-06-06 PROCEDURE — 99153 MOD SED SAME PHYS/QHP EA: CPT | Performed by: NUCLEAR MEDICINE

## 2024-06-06 PROCEDURE — 86901 BLOOD TYPING SEROLOGIC RH(D): CPT

## 2024-06-06 PROCEDURE — 85347 COAGULATION TIME ACTIVATED: CPT

## 2024-06-06 PROCEDURE — 85730 THROMBOPLASTIN TIME PARTIAL: CPT

## 2024-06-06 PROCEDURE — C1769 GUIDE WIRE: HCPCS | Performed by: NUCLEAR MEDICINE

## 2024-06-06 PROCEDURE — 86850 RBC ANTIBODY SCREEN: CPT

## 2024-06-06 PROCEDURE — 93005 ELECTROCARDIOGRAM TRACING: CPT | Performed by: PHYSICIAN ASSISTANT

## 2024-06-06 PROCEDURE — 2709999900 HC NON-CHARGEABLE SUPPLY: Performed by: NUCLEAR MEDICINE

## 2024-06-06 PROCEDURE — 7100000010 HC PHASE II RECOVERY - FIRST 15 MIN: Performed by: NUCLEAR MEDICINE

## 2024-06-06 RX ORDER — SODIUM CHLORIDE 0.9 % (FLUSH) 0.9 %
5-40 SYRINGE (ML) INJECTION PRN
Status: DISCONTINUED | OUTPATIENT
Start: 2024-06-06 | End: 2024-06-06 | Stop reason: HOSPADM

## 2024-06-06 RX ORDER — SODIUM CHLORIDE 9 MG/ML
INJECTION, SOLUTION INTRAVENOUS PRN
Status: DISCONTINUED | OUTPATIENT
Start: 2024-06-06 | End: 2024-06-06 | Stop reason: HOSPADM

## 2024-06-06 RX ORDER — SODIUM CHLORIDE 0.9 % (FLUSH) 0.9 %
5-40 SYRINGE (ML) INJECTION EVERY 12 HOURS SCHEDULED
Status: DISCONTINUED | OUTPATIENT
Start: 2024-06-06 | End: 2024-06-06 | Stop reason: HOSPADM

## 2024-06-06 RX ORDER — NITROGLYCERIN 0.4 MG/1
0.4 TABLET SUBLINGUAL EVERY 5 MIN PRN
Status: DISCONTINUED | OUTPATIENT
Start: 2024-06-06 | End: 2024-06-06 | Stop reason: HOSPADM

## 2024-06-06 RX ORDER — ONDANSETRON 2 MG/ML
INJECTION INTRAMUSCULAR; INTRAVENOUS PRN
Status: DISCONTINUED | OUTPATIENT
Start: 2024-06-06 | End: 2024-06-06 | Stop reason: HOSPADM

## 2024-06-06 RX ORDER — SODIUM CHLORIDE 9 MG/ML
INJECTION, SOLUTION INTRAVENOUS CONTINUOUS
Status: DISCONTINUED | OUTPATIENT
Start: 2024-06-06 | End: 2024-06-06 | Stop reason: HOSPADM

## 2024-06-06 RX ORDER — IBUPROFEN 200 MG
400 TABLET ORAL 2 TIMES DAILY
COMMUNITY

## 2024-06-06 RX ORDER — FENTANYL CITRATE 50 UG/ML
INJECTION, SOLUTION INTRAMUSCULAR; INTRAVENOUS PRN
Status: DISCONTINUED | OUTPATIENT
Start: 2024-06-06 | End: 2024-06-06 | Stop reason: HOSPADM

## 2024-06-06 RX ORDER — ASPIRIN 325 MG
325 TABLET ORAL ONCE
Status: COMPLETED | OUTPATIENT
Start: 2024-06-06 | End: 2024-06-06

## 2024-06-06 RX ORDER — ACETAMINOPHEN 325 MG/1
650 TABLET ORAL EVERY 4 HOURS PRN
Status: DISCONTINUED | OUTPATIENT
Start: 2024-06-06 | End: 2024-06-06 | Stop reason: HOSPADM

## 2024-06-06 RX ORDER — MIDAZOLAM HYDROCHLORIDE 1 MG/ML
INJECTION INTRAMUSCULAR; INTRAVENOUS PRN
Status: DISCONTINUED | OUTPATIENT
Start: 2024-06-06 | End: 2024-06-06 | Stop reason: HOSPADM

## 2024-06-06 RX ORDER — OXYBUTYNIN CHLORIDE 10 MG/1
10 TABLET, EXTENDED RELEASE ORAL DAILY
COMMUNITY

## 2024-06-06 RX ORDER — ATROPINE SULFATE 0.4 MG/ML
0.5 INJECTION, SOLUTION INTRAVENOUS
Status: DISCONTINUED | OUTPATIENT
Start: 2024-06-06 | End: 2024-06-06 | Stop reason: HOSPADM

## 2024-06-06 RX ADMIN — ASPIRIN 325 MG: 325 TABLET ORAL at 10:09

## 2024-06-06 RX ADMIN — SODIUM CHLORIDE: 9 INJECTION, SOLUTION INTRAVENOUS at 10:57

## 2024-06-06 ASSESSMENT — PAIN SCALES - GENERAL: PAINLEVEL_OUTOF10: 0

## 2024-06-06 NOTE — PROGRESS NOTES
1515 Sheath removed from right femoral artery, manual pressure applied for 15 minutes. Pt tolerated well.  Denies pain.  Site soft, no swelling, no oozing.

## 2024-06-06 NOTE — H&P
[facility]  Sedation/Analgesia History & Physical    Pt Name: Blayne Rubin  Account number: [id number]  MRN: [id number]  YOB: 1945  Provider Performing Procedure: Ember Sanz MD MD [facility]  Primary Care Physician: Allison Mckay MD  Date: 6/6/2024    PRE-PROCEDURE    Code Status: FULL CODE  Brief History/Pre-Procedure Diagnosis:   Angina  Abn stress     Consent: : I have discussed with the patient risks, benefits, and alternatives (and relevant risks, benefits, and side effects related to alternatives or not receiving care), and likelihood of the success.   The patient and/or representative appear to understand and agree to proceed.  The discussion encompasses risks, benefits, and side effects related to the alternatives and the risks related to not receiving the proposed care, treatment, and services.         MEDICAL HISTORY  [x]ASHD/ANGINA/MI/CHF   [x]Hypertension  []Diabetes  []Hyperlipidemia  []Smoking  []Family Hx of ASHD  []Additional information:       has a past medical history of Arthritis, Cardiomyopathy (HCC), COVID-19, Kidney stone, Nephrolithiasis, Osteoporosis, Ovarian cancer (HCC), PONV (postoperative nausea and vomiting), Thyroid disease, and Tremors of nervous system.    SURGICAL HISTORY   has a past surgical history that includes Tonsillectomy (1965); knee surgery (1995); Cholecystectomy, laparoscopic (08/2001); Hysterectomy, total abdominal (04/18/2018); joint replacement (Right, 2019); Lithotripsy (Left, 4/25/2023); Ureter surgery (Left, 5/2/2023); and Parathyroid gland surgery (Bilateral, 8/11/2023).  Additional information:       ALLERGIES   Allergies as of 05/17/2024 - Fully Reviewed 05/16/2024   Allergen Reaction Noted    Latex Rash 01/16/2019     Additional information:       MEDICATIONS   Aspirin  [x] 81 mg  [] 325 mg  [] None  Antiplatelet drug therapy use last 5 days  []No []Yes  Coumadin Use Last 5 Days []No []Yes  Other anticoagulant use last 5  SIGNS   Vitals:    06/06/24 1009   BP: (!) 149/87   Pulse: 63   Resp: 13   Temp:    SpO2:        PHYSICAL:   General: No acute distress  HEENT:  Unremarkable for age  Neck: without increased JVD, carotid pulses 2+ bilaterally without bruits  Heart: RRR, S1 & S2 WNL, S4 gallop, without murmurs or rubs    Lungs: Clear to auscultation    Abdomen: BS present, without HSM, masses, or tenderness    Extremities: without C,C,E.  Pulses 2+ bilaterally  Mental Status: Alert & Oriented        PLANNED PROCEDURE   [x]Cath  []PCI                []Pacemaker/AICD  []LIZ             []Cardioversion []Peripheral angiography/PTA  []Other:      SEDATION  Planned agent:[x]Midazolam []Meperidine [x]Sublimaze []Morphine  []Diazepam  []Other:     ASA Classification:  []1 [x]2 []3 []4 []5  Class 1: A normal healthy patient  Class 2: Pt with mild to moderate systemic disease  Class 3: Severe systemic disease or disturbance  Class 4: Severe systemic disorders that are already life threatening.  Class 5: Moribund pt with little chances of survival, for more than 24 hours.  Mallampati I Airway Classification:   []1 [x]2 []3 []4    [x]Pre-procedure diagnostic studies complete and results available.   Comment:    [x]Previous sedation/anesthesia experiences assessed.   Comment:    [x]The patient is an appropriate candidate to undergo the planned procedure sedation and anesthesia. (Refer to nursing sedation/analgesia documentation record)  [x]Formulation and discussion of sedation/procedure plan, risks, and expectations with patient and/or responsible adult completed.  [x]Patient examined immediately prior to the procedure. (Refer to nursing sedation/analgesia documentation record)    Ember Sanz MD MD [facility]  Electronically signed 6/6/2024 at 11:13 AM

## 2024-06-06 NOTE — FLOWSHEET NOTE
06/06/24 1809   AVS Reviewed   AVS & discharge instructions reviewed with patient and/or representative? Yes   Reviewed instructions with Patient   Level of Understanding Questions answered;Teach back completed;Verbalized understanding;Return demonstration

## 2024-06-06 NOTE — PROGRESS NOTES
1320 Care taken over from cath lab, right groin stable with sheath in place . Patient reinstructed on bedrest, instructed to keep legs straight, not to cross legs, not to lift head off of pillow, not to laugh hard, if coughs to guard site with hand, voices understanding.   Radial site stable, no bleeding seen, site soft.  Armboard continued with vascband. Patient instructed not to bend wrist, not to put pressure on wrist and not to lift  or twist with wrist. Patient voices understanding.

## 2024-06-06 NOTE — PROGRESS NOTES
Patient admitted to 2E14  Ambulatory for cardiac cath.  Patient NPO. Patient accompanied by family.  Vital signs obtained.   Assessment and data collection intiated.   Oriented to room.  Policies and procedures for 2E explained.   All questions answered with no further questions at this time.   Fall prevention and safety precautions discussed with patient.

## 2024-06-06 NOTE — PLAN OF CARE
Problem: Chronic Conditions and Co-morbidities  Goal: Patient's chronic conditions and co-morbidity symptoms are monitored and maintained or improved  Outcome: Progressing  Flowsheets (Taken 6/6/2024 1000)  Care Plan - Patient's Chronic Conditions and Co-Morbidity Symptoms are Monitored and Maintained or Improved:   Monitor and assess patient's chronic conditions and comorbid symptoms for stability, deterioration, or improvement   Collaborate with multidisciplinary team to address chronic and comorbid conditions and prevent exacerbation or deterioration     Problem: Pain  Goal: Verbalizes/displays adequate comfort level or baseline comfort level  Outcome: Progressing  Flowsheets (Taken 6/6/2024 1000)  Verbalizes/displays adequate comfort level or baseline comfort level:   Encourage patient to monitor pain and request assistance   Assess pain using appropriate pain scale     Problem: Cardiovascular - Adult  Goal: Maintains optimal cardiac output and hemodynamic stability  Outcome: Progressing  Flowsheets (Taken 6/6/2024 1000)  Maintains optimal cardiac output and hemodynamic stability:   Monitor blood pressure and heart rate   Monitor urine output and notify Licensed Independent Practitioner for values outside of normal range     Problem: Discharge Planning  Goal: Discharge to home or other facility with appropriate resources  Outcome: Progressing  Flowsheets (Taken 6/6/2024 1000)  Discharge to home or other facility with appropriate resources:   Identify barriers to discharge with patient and caregiver   Arrange for needed discharge resources and transportation as appropriate   Care plan reviewed with patient.  Patient verbalizes understanding of the plan of care and contributes to goal setting.

## 2024-06-07 NOTE — FLOWSHEET NOTE
Discharged per wc per transport team with personal items   Has cell phone, glasses, home meds, clothes with her

## 2024-06-12 ENCOUNTER — OFFICE VISIT (OUTPATIENT)
Dept: FAMILY MEDICINE CLINIC | Age: 79
End: 2024-06-12

## 2024-06-12 VITALS
RESPIRATION RATE: 16 BRPM | HEIGHT: 64 IN | DIASTOLIC BLOOD PRESSURE: 70 MMHG | WEIGHT: 260 LBS | HEART RATE: 65 BPM | BODY MASS INDEX: 44.39 KG/M2 | OXYGEN SATURATION: 97 % | SYSTOLIC BLOOD PRESSURE: 132 MMHG

## 2024-06-12 DIAGNOSIS — L08.9 BACTERIAL SKIN INFECTION: Primary | ICD-10-CM

## 2024-06-12 DIAGNOSIS — B96.89 BACTERIAL SKIN INFECTION: Primary | ICD-10-CM

## 2024-06-12 RX ORDER — CEPHALEXIN 500 MG/1
500 CAPSULE ORAL 3 TIMES DAILY
Qty: 21 CAPSULE | Refills: 0 | Status: SHIPPED | OUTPATIENT
Start: 2024-06-12 | End: 2024-06-19

## 2024-06-15 NOTE — PROGRESS NOTES
Blayne Rubin (:  1945) is a 78 y.o. female,Established patient, here for evaluation of the following chief complaint(s):  Rash (Left shoulder, very itchy, going on for several weeks. Has noticed some spreading within the same hear. Describes as a scab like feeling. )      Assessment & Plan   ASSESSMENT/PLAN:  1. Bacterial skin infection  -     cephALEXin (KEFLEX) 500 MG capsule; Take 1 capsule by mouth 3 times daily for 7 days, Disp-21 capsule, R-0Normal  -     mupirocin (BACTROBAN) 2 % ointment; Apply topically 3 times daily x 7 day, Disp-30 g, R-0, Normal    We considered differential diagnosis including shingles.  Patient reports that the pain is not a significant problem.  She is concerned because the spots and scabs continue to spread.  It has been over a week now. We will treat as above.    Return if symptoms worsen or fail to improve, for 24.         Subjective   SUBJECTIVE/OBJECTIVE:  HPI  Patient reports that over the last couple of weeks she has had a development of a rash on her left upper shoulder region.  There was no pain prodrome or burning/tingling/numbness.  There is discomfort and itching.  She cannot think of anything that she came in contact with that would trigger it.  She tried OTC antibacterial options.     Review of Systems   No fever/chills. No respiratory or GI issues.     Objective   Physical Exam   Gen: NAD, AAO x 3, coherent, pleasant    Left upper shoulder towards neck region and down upper scapula, she has a 4 in by 5 in area with scattered erythematous lesions that are raised, mostly soft with slight excoriation. No honey crusted lesion. No vesicles.    This office note may have been at least partially dictated. Effort was made to review for errors but some may have been missed. Please contact author of note for clarification if needed.     An electronic signature was used to authenticate this note.    --Allison Mckay MD

## 2024-07-23 ENCOUNTER — OFFICE VISIT (OUTPATIENT)
Dept: PULMONOLOGY | Age: 79
End: 2024-07-23
Payer: MEDICARE

## 2024-07-23 VITALS
WEIGHT: 260.4 LBS | OXYGEN SATURATION: 96 % | HEIGHT: 64 IN | SYSTOLIC BLOOD PRESSURE: 138 MMHG | HEART RATE: 46 BPM | TEMPERATURE: 96.8 F | DIASTOLIC BLOOD PRESSURE: 72 MMHG | BODY MASS INDEX: 44.46 KG/M2

## 2024-07-23 DIAGNOSIS — R53.82 CHRONIC FATIGUE: Primary | ICD-10-CM

## 2024-07-23 DIAGNOSIS — G47.10 HYPERSOMNIA, UNSPECIFIED: ICD-10-CM

## 2024-07-23 DIAGNOSIS — I10 PRIMARY HYPERTENSION: ICD-10-CM

## 2024-07-23 DIAGNOSIS — R06.83 SNORING: ICD-10-CM

## 2024-07-23 DIAGNOSIS — I42.9 CARDIOMYOPATHY, UNSPECIFIED TYPE (HCC): ICD-10-CM

## 2024-07-23 DIAGNOSIS — E66.01 MORBID OBESITY WITH BMI OF 40.0-44.9, ADULT (HCC): ICD-10-CM

## 2024-07-23 PROCEDURE — 1036F TOBACCO NON-USER: CPT | Performed by: INTERNAL MEDICINE

## 2024-07-23 PROCEDURE — 3078F DIAST BP <80 MM HG: CPT | Performed by: INTERNAL MEDICINE

## 2024-07-23 PROCEDURE — 1124F ACP DISCUSS-NO DSCNMKR DOCD: CPT | Performed by: INTERNAL MEDICINE

## 2024-07-23 PROCEDURE — 1090F PRES/ABSN URINE INCON ASSESS: CPT | Performed by: INTERNAL MEDICINE

## 2024-07-23 PROCEDURE — G8427 DOCREV CUR MEDS BY ELIG CLIN: HCPCS | Performed by: INTERNAL MEDICINE

## 2024-07-23 PROCEDURE — G8417 CALC BMI ABV UP PARAM F/U: HCPCS | Performed by: INTERNAL MEDICINE

## 2024-07-23 PROCEDURE — 3075F SYST BP GE 130 - 139MM HG: CPT | Performed by: INTERNAL MEDICINE

## 2024-07-23 PROCEDURE — 99204 OFFICE O/P NEW MOD 45 MIN: CPT | Performed by: INTERNAL MEDICINE

## 2024-07-23 PROCEDURE — G8399 PT W/DXA RESULTS DOCUMENT: HCPCS | Performed by: INTERNAL MEDICINE

## 2024-07-23 NOTE — PROGRESS NOTES
New Sleep Patient H/P    Presentation:  Blayne is referred by Nori  for  suspected antonina, hypertension    Patient is a 78-year-old female referred by her cardiologist to be evaluated in the sleep clinic due to BMI of 44.7, snoring, cardiomyopathy of unclear etiology, dyspnea on exertion, need for naps during the day presence of hypertension currently on losartan, bradycardia    Patient says sleep is disrupted mainly because her  has PTSD from [place] and he is up and about most of the night, oftentimes patient feels tired in the during the morning and requires a nap, she has been told by her  that she is snores.    Usually sleep time is from midnight to 8:30 AM, often fall asleep without planning a nap, sometimes she plans the nap, denies sleepiness while driving denies falling asleep and inappropriate situations    History of cardiomyopathy with ejection fraction about 45%, hyperparathyroidism status post parathyroidectomy, hypertension, history of COVID-19, nephrolithiasis, ovarian cancer, osteoporosis      Time in Bed:   Bedtime: 12 a.m.   Awakens  9 a.m.   Different on weekends? No       Blayne falls asleep in 10  minutes.  Any awakenings? Yes  Difficulty Falling back to sleep? No  {Symptoms began:  a few years ago.    Symptoms include: snoring, disrupted sleep, naps    Previous evaluation and treatment? No     She denies any history of sleep walking or sleep talking. No history of seizures activity. No history suggestive of restless legs syndrome. No history of bruxism. No history of head injury.    Naps:  Any naps? Yes and are they helpful Yes    Snoring and Apneas:  Do you snore or been told you a snore? Yes  How long have known about your snoring? years  Any witnessed apneas? No  Any awakenings with choking or gasping? No    Dreams:  Any recurring dreams? No  Hallucinations? No  Sleep Paralysis? No  Symptoms of Cataplexy? No    Driving History:  Do you have

## 2024-08-02 ENCOUNTER — OFFICE VISIT (OUTPATIENT)
Dept: CARDIOLOGY CLINIC | Age: 79
End: 2024-08-02
Payer: MEDICARE

## 2024-08-02 VITALS
HEIGHT: 64 IN | SYSTOLIC BLOOD PRESSURE: 180 MMHG | WEIGHT: 262 LBS | BODY MASS INDEX: 44.73 KG/M2 | DIASTOLIC BLOOD PRESSURE: 88 MMHG | HEART RATE: 58 BPM

## 2024-08-02 DIAGNOSIS — I42.0 DILATED CARDIOMYOPATHY (HCC): Primary | ICD-10-CM

## 2024-08-02 DIAGNOSIS — I10 PRIMARY HYPERTENSION: ICD-10-CM

## 2024-08-02 DIAGNOSIS — I25.10 CORONARY ARTERY DISEASE INVOLVING NATIVE CORONARY ARTERY OF NATIVE HEART WITHOUT ANGINA PECTORIS: ICD-10-CM

## 2024-08-02 DIAGNOSIS — I50.22 HEART FAILURE WITH MILDLY REDUCED EJECTION FRACTION (HFMREF) (HCC): ICD-10-CM

## 2024-08-02 PROCEDURE — G8417 CALC BMI ABV UP PARAM F/U: HCPCS | Performed by: NURSE PRACTITIONER

## 2024-08-02 PROCEDURE — 1036F TOBACCO NON-USER: CPT | Performed by: NURSE PRACTITIONER

## 2024-08-02 PROCEDURE — 1124F ACP DISCUSS-NO DSCNMKR DOCD: CPT | Performed by: NURSE PRACTITIONER

## 2024-08-02 PROCEDURE — 3077F SYST BP >= 140 MM HG: CPT | Performed by: NURSE PRACTITIONER

## 2024-08-02 PROCEDURE — 99214 OFFICE O/P EST MOD 30 MIN: CPT | Performed by: NURSE PRACTITIONER

## 2024-08-02 PROCEDURE — G8399 PT W/DXA RESULTS DOCUMENT: HCPCS | Performed by: NURSE PRACTITIONER

## 2024-08-02 PROCEDURE — G8427 DOCREV CUR MEDS BY ELIG CLIN: HCPCS | Performed by: NURSE PRACTITIONER

## 2024-08-02 PROCEDURE — 1090F PRES/ABSN URINE INCON ASSESS: CPT | Performed by: NURSE PRACTITIONER

## 2024-08-02 PROCEDURE — 3079F DIAST BP 80-89 MM HG: CPT | Performed by: NURSE PRACTITIONER

## 2024-08-02 NOTE — PROGRESS NOTES
[facility] [place] [facility]  [facility]  [address]  [address]  [facility] [address]  Dept: [phone]  Dept Fax: [phone]  Loc: [phone]    Visit Date: 8/26/2024    Blayne Rubin is a 78 y.o. female who presents today for: 3 month follow-up    Primary Cardiologist:  Dr. Julio    Chief Complaint   Patient presents with    Follow-up     3 month fu- denies any cardiac concerns at this time   HPI:  Last seen in office on 5/2/2024 per Dr. Julio. Per office note:  Not seen in 2 years   Cath 2021  Mild to moderate CAD  Lately had some bradycardia  And dyspnea  Exertional in nature  Had echo   EF 45% to 50%  Previous mild CMP  Known HTN  Seems stable to higher  Did have COVID 2020  Been SOB since then   No chest pain   No syncope  Assessment & Plan    Diagnosis Orders   1. Dilated cardiomyopathy (HCC)          2. Dyspnea on exertion          3. Primary hypertension          As above  Minimal worsening EF   Risk for CAD  Symptoms as above  Possible sleep apnea   Lower HR  Plan:  No follow-ups on file.  Discussed  Sleep study  Cassandra   Holter ?  Continue risk factor modification and medical management      Today's visit:   Subjective:  Riding 3 - 4x a week -bike outside  No water retention  BP has been good  Having knee pain - needs cortisone injection  No chest discomfort or dyspnea  No lightheadedness or dizziness; no syncope or near syncope  Tolerating meds      Past Medical History:   Diagnosis Date    Arthritis     knees bilat    Cardiomyopathy (HCC)     COVID-19 2020    scar tissue from this in both lungs per pt    Kidney stone     Nephrolithiasis     Osteoporosis     Ovarian cancer (HCC) 04/18/2018    PONV (postoperative nausea and vomiting)     Thyroid disease     just found out that  wants to see endocinroloisgt    Tremors of nervous system       Past Surgical History:   Procedure Laterality Date    CARDIAC PROCEDURE N/A 6/6/2024    Left heart cath / coronary angiography  performed by Ember Sanz MD at [facility] CARDIAC CATH LAB    CHOLECYSTECTOMY, LAPAROSCOPIC  08/2001    HYSTERECTOMY, TOTAL ABDOMINAL (CERVIX REMOVED)  04/18/2018    JOINT REPLACEMENT Right 2019    knee    KNEE SURGERY  1995    arthroscopic    LITHOTRIPSY Left 4/25/2023    Left ESWL EXTRACORPOREAL SHOCK WAVE LITHOTRIPSY Left insertion performed by Cosmo Mendez MD at [facility] OR    PARATHYROID GLAND SURGERY Bilateral 8/11/2023    BILATRAL PARATHYROIDECTOMY performed by Harry Mehta MD at [facility] OR    TONSILLECTOMY  1965    URETER SURGERY Left 5/2/2023    Cystoscopy,Left ureteroscopy, laser lithotripsyleft ureteral stent placement. performed by Americo Segundo MD at [facility] OR     Family History   Problem Relation Age of Onset    Cancer Father     Mult Sclerosis Sister     Hearing Loss Maternal Grandfather      Social History     Tobacco Use    Smoking status: Never    Smokeless tobacco: Never   Substance Use Topics    Alcohol use: Yes     Comment: occasional      Current Outpatient Medications   Medication Sig Dispense Refill    oxyBUTYnin (DITROPAN-XL) 10 MG extended release tablet Take 1 tablet by mouth daily      ibuprofen (ADVIL;MOTRIN) 200 MG tablet Take 2 tablets by mouth in the morning and at bedtime      primidone (MYSOLINE) 50 MG tablet TAKE 1 TABLET BY MOUTH THREE TIMES DAILY 270 tablet 3    albuterol sulfate HFA (VENTOLIN HFA) 108 (90 Base) MCG/ACT inhaler Inhale 2 puffs into the lungs 4 times daily as needed for Wheezing 18 g 1    magnesium 30 MG tablet Take 1 tablet by mouth 2 times daily      losartan (COZAAR) 25 MG tablet Take 1 tablet by mouth once daily 90 tablet 3    folic acid (FOLVITE) 1 MG tablet Take 1 tablet by mouth daily      Cholecalciferol (VITAMIN D3) 125 MCG (5000 UT) TABS Take by mouth      zinc 50 MG TABS tablet Take 1 tablet by mouth daily      Handicap Placard MISC by Does not apply route Expiration in 5 years 1 each 0    vitamin B-12 (CYANOCOBALAMIN) 1000 MCG tablet Take 1 tablet by mouth

## 2024-08-02 NOTE — PATIENT INSTRUCTIONS
Continue current medications as prescribed.    Stay as active as you can.     Eat heart healthy diet.     Follow-up with your PCP as scheduled.    Follow-up with      as scheduled or sooner if need.

## 2024-08-29 ENCOUNTER — HOSPITAL ENCOUNTER (OUTPATIENT)
Dept: SLEEP CENTER | Age: 79
Discharge: HOME | End: 2024-08-31
Payer: MEDICARE

## 2024-08-29 DIAGNOSIS — E66.01 MORBID OBESITY WITH BMI OF 40.0-44.9, ADULT (HCC): ICD-10-CM

## 2024-08-29 DIAGNOSIS — I10 PRIMARY HYPERTENSION: ICD-10-CM

## 2024-08-29 DIAGNOSIS — G47.10 HYPERSOMNIA, UNSPECIFIED: ICD-10-CM

## 2024-08-29 DIAGNOSIS — R53.82 CHRONIC FATIGUE: ICD-10-CM

## 2024-08-29 DIAGNOSIS — R06.83 SNORING: ICD-10-CM

## 2024-08-29 PROCEDURE — 95806 SLEEP STUDY UNATT&RESP EFFT: CPT

## 2024-08-29 NOTE — PROGRESS NOTES
Blayne presents today for a HST instruction and demonstration on unit # [id number]. Questions were asked and answers given.  She was able to return demonstration and verbalized understanding.  The sleep center control room phone number was provided in case questions arise during the study. Informed patient to call 911 in case of an emergency.   She states she will return the unit tomorrow before 1000.                       Title:  Home Sleep Apnea Testing (HSAT)     Approved by:  Sophia Genao MD        Approval Date:   March, 2024 Next Review:   March, 2026       Responsible Party:  Shirin Wilkerson  Institution/Entities Applies to:    [facility]    Policy Number:  None      Document Type:  Such as Guideline, Policy,   Policy & Procedure, or Procedure, Instructions   Manual:  Policy and Procedures     Section: IV  Policy Start Date: June, 2011       HOME SLEEP APNEA TESTING (HSAT)      PURPOSE: To ensure home sleep apnea testing (HSAT) conducted by the sleep facility adheres to the current AASM practice parameters, clinical practice guidelines, best practice and clinical guidelines in regard to the diagnosis of AVANI in adults.       POLICY:      HSAT is a method of recording certain parameters which will target and measure, minimally, heart rate, oxygen saturation, respiratory airflow, respiratory effort and snoring for the purpose of evaluating a patient for AVANI.       HSAT will be performed in conjunction with a comprehensive sleep evaluation by an appropriately licensed sleep facility medical staff member. All portable monitoring equipment will be FDA-approved and appropriately maintained to ensure patient safety and efficiency of the test.       PROCEDURE:      An order from a licensed sleep physician along with appropriate medical history documenting the indication for HSAT that complies with the AASM practice parameters.    All tests will be performed, and records will be

## 2024-09-06 ENCOUNTER — OFFICE VISIT (OUTPATIENT)
Dept: FAMILY MEDICINE CLINIC | Age: 79
End: 2024-09-06

## 2024-09-06 VITALS
DIASTOLIC BLOOD PRESSURE: 70 MMHG | HEART RATE: 66 BPM | OXYGEN SATURATION: 98 % | WEIGHT: 257 LBS | BODY MASS INDEX: 43.87 KG/M2 | HEIGHT: 64 IN | RESPIRATION RATE: 18 BRPM | SYSTOLIC BLOOD PRESSURE: 132 MMHG

## 2024-09-06 DIAGNOSIS — Z00.00 MEDICARE ANNUAL WELLNESS VISIT, SUBSEQUENT: Primary | ICD-10-CM

## 2024-09-06 DIAGNOSIS — R06.02 SOB (SHORTNESS OF BREATH): ICD-10-CM

## 2024-09-06 DIAGNOSIS — G24.3 CERVICAL DYSTONIA: ICD-10-CM

## 2024-09-06 DIAGNOSIS — G25.0 BENIGN ESSENTIAL TREMOR: ICD-10-CM

## 2024-09-06 RX ORDER — PRIMIDONE 50 MG/1
75 TABLET ORAL 3 TIMES DAILY
Qty: 405 TABLET | Refills: 1 | Status: SHIPPED | OUTPATIENT
Start: 2024-09-06 | End: 2025-03-05

## 2024-09-06 RX ORDER — ALBUTEROL SULFATE 90 UG/1
2 AEROSOL, METERED RESPIRATORY (INHALATION) 4 TIMES DAILY PRN
Qty: 18 G | Refills: 1 | Status: SHIPPED | OUTPATIENT
Start: 2024-09-06

## 2024-09-06 ASSESSMENT — LIFESTYLE VARIABLES
HOW OFTEN DO YOU HAVE A DRINK CONTAINING ALCOHOL: NEVER
HOW MANY STANDARD DRINKS CONTAINING ALCOHOL DO YOU HAVE ON A TYPICAL DAY: PATIENT DOES NOT DRINK

## 2024-09-06 ASSESSMENT — PATIENT HEALTH QUESTIONNAIRE - PHQ9
1. LITTLE INTEREST OR PLEASURE IN DOING THINGS: NOT AT ALL
SUM OF ALL RESPONSES TO PHQ QUESTIONS 1-9: 0
SUM OF ALL RESPONSES TO PHQ QUESTIONS 1-9: 0
2. FEELING DOWN, DEPRESSED OR HOPELESS: NOT AT ALL
SUM OF ALL RESPONSES TO PHQ9 QUESTIONS 1 & 2: 0
SUM OF ALL RESPONSES TO PHQ QUESTIONS 1-9: 0
SUM OF ALL RESPONSES TO PHQ QUESTIONS 1-9: 0

## 2024-09-10 ENCOUNTER — OFFICE VISIT (OUTPATIENT)
Dept: PULMONOLOGY | Age: 79
End: 2024-09-10
Payer: MEDICARE

## 2024-09-10 VITALS
TEMPERATURE: 98 F | BODY MASS INDEX: 42.78 KG/M2 | DIASTOLIC BLOOD PRESSURE: 78 MMHG | WEIGHT: 256.8 LBS | SYSTOLIC BLOOD PRESSURE: 118 MMHG | OXYGEN SATURATION: 94 % | HEART RATE: 62 BPM | HEIGHT: 65 IN

## 2024-09-10 DIAGNOSIS — R06.83 SNORING: Primary | ICD-10-CM

## 2024-09-10 DIAGNOSIS — E66.01 MORBID OBESITY WITH BMI OF 40.0-44.9, ADULT (HCC): ICD-10-CM

## 2024-09-10 DIAGNOSIS — I42.9 CARDIOMYOPATHY, UNSPECIFIED TYPE (HCC): ICD-10-CM

## 2024-09-10 DIAGNOSIS — R53.82 CHRONIC FATIGUE: ICD-10-CM

## 2024-09-10 PROCEDURE — 1090F PRES/ABSN URINE INCON ASSESS: CPT | Performed by: NURSE PRACTITIONER

## 2024-09-10 PROCEDURE — G8417 CALC BMI ABV UP PARAM F/U: HCPCS | Performed by: NURSE PRACTITIONER

## 2024-09-10 PROCEDURE — 1036F TOBACCO NON-USER: CPT | Performed by: NURSE PRACTITIONER

## 2024-09-10 PROCEDURE — G8399 PT W/DXA RESULTS DOCUMENT: HCPCS | Performed by: NURSE PRACTITIONER

## 2024-09-10 PROCEDURE — 99213 OFFICE O/P EST LOW 20 MIN: CPT | Performed by: NURSE PRACTITIONER

## 2024-09-10 PROCEDURE — G8427 DOCREV CUR MEDS BY ELIG CLIN: HCPCS | Performed by: NURSE PRACTITIONER

## 2024-09-10 PROCEDURE — 1124F ACP DISCUSS-NO DSCNMKR DOCD: CPT | Performed by: NURSE PRACTITIONER

## 2024-09-23 DIAGNOSIS — M81.0 AGE-RELATED OSTEOPOROSIS WITHOUT CURRENT PATHOLOGICAL FRACTURE: Primary | ICD-10-CM

## 2024-10-02 ENCOUNTER — HOSPITAL ENCOUNTER (OUTPATIENT)
Age: 79
Discharge: HOME | End: 2024-10-02
Payer: MEDICARE

## 2024-10-02 DIAGNOSIS — M81.0 AGE-RELATED OSTEOPOROSIS WITHOUT CURRENT PATHOLOGICAL FRACTURE: ICD-10-CM

## 2024-10-02 LAB
CALCIUM SERPL-MCNC: 10.1 MG/DL (ref 8.5–10.5)
CREAT SERPL-MCNC: 0.9 MG/DL (ref 0.4–1.2)
GFR SERPL CREATININE-BSD FRML MDRD: 66 ML/MIN/1.73M2

## 2024-10-02 PROCEDURE — 82310 ASSAY OF CALCIUM: CPT

## 2024-10-02 PROCEDURE — 36415 COLL VENOUS BLD VENIPUNCTURE: CPT

## 2024-10-02 PROCEDURE — 82565 ASSAY OF CREATININE: CPT

## 2024-10-30 ENCOUNTER — HOSPITAL ENCOUNTER (OUTPATIENT)
Dept: GENERAL RADIOLOGY | Age: 79
Discharge: HOME | End: 2024-10-30
Payer: MEDICARE

## 2024-10-30 VITALS
SYSTOLIC BLOOD PRESSURE: 190 MMHG | DIASTOLIC BLOOD PRESSURE: 77 MMHG | HEART RATE: 70 BPM | RESPIRATION RATE: 18 BRPM | OXYGEN SATURATION: 98 % | TEMPERATURE: 96.9 F

## 2024-10-30 DIAGNOSIS — M81.0 AGE-RELATED OSTEOPOROSIS WITHOUT CURRENT PATHOLOGICAL FRACTURE: Primary | ICD-10-CM

## 2024-10-30 PROCEDURE — 96372 THER/PROPH/DIAG INJ SC/IM: CPT

## 2024-10-30 PROCEDURE — 6360000002 HC RX W HCPCS: Performed by: FAMILY MEDICINE

## 2024-10-30 RX ORDER — ONDANSETRON 2 MG/ML
8 INJECTION INTRAMUSCULAR; INTRAVENOUS
OUTPATIENT
Start: 2025-04-27

## 2024-10-30 RX ORDER — EPINEPHRINE 1 MG/ML
0.3 INJECTION, SOLUTION INTRAMUSCULAR; SUBCUTANEOUS PRN
OUTPATIENT
Start: 2025-04-27

## 2024-10-30 RX ORDER — SODIUM CHLORIDE 9 MG/ML
INJECTION, SOLUTION INTRAVENOUS CONTINUOUS
OUTPATIENT
Start: 2025-04-27

## 2024-10-30 RX ORDER — ALBUTEROL SULFATE 90 UG/1
4 INHALANT RESPIRATORY (INHALATION) PRN
OUTPATIENT
Start: 2025-04-27

## 2024-10-30 RX ORDER — DIPHENHYDRAMINE HYDROCHLORIDE 50 MG/ML
50 INJECTION INTRAMUSCULAR; INTRAVENOUS
OUTPATIENT
Start: 2025-04-27

## 2024-10-30 RX ORDER — ACETAMINOPHEN 325 MG/1
650 TABLET ORAL
OUTPATIENT
Start: 2025-04-27

## 2024-10-30 RX ADMIN — DENOSUMAB 60 MG: 60 INJECTION SUBCUTANEOUS at 12:07

## 2024-10-30 NOTE — PROGRESS NOTES
__met__ Safety   GOAL: Patient will have ID or Allergy band on in the Urgent Care       (Environmental)  [place] to environment  Ensure ID band is correct and in place/ allergy band as needed  Assess for fall risk  Initiate fall precautions as applicable (fall band, side rails, etc.)  Call light within reach  Bed in low position/ wheels locked    __met__ Pain   GOAL:  Patient pain will be under control while here in the Urgent Care     Assess pain level and characteristics  Administer analgesics as ordered  Assess effectiveness of pain management and report to MD as needed    _met__ Knowledge Deficit:   GOAL: Patient will be educated on the medication they are receiving here in the Urgent Care  Assess baseline knowledge  Provide teaching at level of understanding  Provide teaching via preferred learning method  Evaluate teaching effectiveness    __met__ Hemodynamic/Respiratory Status:   GOAL: Patient vital signs will be assessed and monitored in the Urgent Care       (Pre and Post Procedure Monitoring)  Assess/Monitor vital signs and LOC  Assess Baseline SpO2 prior to any sedation  Obtain weight/height  Assess vital signs/ LOC until patient meets discharge criteria  Monitor procedure site and notify MD of any issues        CARE PLAN END DATE: Oct 30, 2024

## 2024-10-30 NOTE — PROGRESS NOTES
Patient walked to triage room for Prolia injection, assessment and vitals signs done at this time. Patient tolerated the shot fine and waiting in the room.

## 2024-10-30 NOTE — DISCHARGE INSTR - COC
Continuity of Care Form    Patient Name: Blayne Rubin   :  1945  MRN:  [id number]    Admit date:  10/30/2024  Discharge date:  ***    Code Status Order: Prior   Advance Directives:   Advance Care Flowsheet Documentation             Admitting Physician:  No admitting provider for patient encounter.  PCP: Allison Mckay MD    Discharging Nurse: ***  Discharging Hospital Unit/Room#: No information available for this encounter.  Discharging Unit Phone Number: ***    Emergency Contact:   Extended Emergency Contact Information  Primary Emergency Contact: Tyrone Rubin  Address: [address] [place] [address]  Home Phone: [phone]  Relation: Spouse  Secondary Emergency Contact: Adriana Rubin  Home Phone: [phone]  Relation: Child  Preferred language: English   needed? No    Past Surgical History:  Past Surgical History:   Procedure Laterality Date    CARDIAC PROCEDURE N/A 2024    Left heart cath / coronary angiography performed by Ember Sanz MD at [facility] CARDIAC CATH LAB    CHOLECYSTECTOMY, LAPAROSCOPIC  2001    HYSTERECTOMY, TOTAL ABDOMINAL (CERVIX REMOVED)  2018    JOINT REPLACEMENT Right 2019    knee    KNEE SURGERY  1995    arthroscopic    LITHOTRIPSY Left 2023    Left ESWL EXTRACORPOREAL SHOCK WAVE LITHOTRIPSY Left insertion performed by Cosmo Mendez MD at [facility] OR    PARATHYROID GLAND SURGERY Bilateral 2023    BILATRAL PARATHYROIDECTOMY performed by Harry Mehta MD at [facility] OR    TONSILLECTOMY  1965    URETER SURGERY Left 2023    Cystoscopy,Left ureteroscopy, laser lithotripsyleft ureteral stent placement. performed by Americo Segundo MD at [facility] OR       Immunization History:   Immunization History   Administered Date(s) Administered    COVID-19, PFIZER PURPLE top, DILUTE for use, (age 12 y+), 30mcg/0.3mL 2021, 2021, 2021, 2021    Pneumococcal, PCV20, PREVNAR 20,  information and transfer of Blayne ANDRADE Scottie  is necessary for the continuing treatment of the diagnosis listed and that she requires {Admit to Appropriate Level of Care:98337} for {GREATER/LESS:848481635} 30 days.     Update Admission H&P: {CHP DME Changes in HandP:158031837}    PHYSICIAN SIGNATURE:  {Esignature:[id number]}

## 2025-01-21 ENCOUNTER — OFFICE VISIT (OUTPATIENT)
Dept: CARDIOLOGY CLINIC | Age: 80
End: 2025-01-21
Payer: MEDICARE

## 2025-01-21 VITALS
HEIGHT: 65 IN | WEIGHT: 262 LBS | BODY MASS INDEX: 43.65 KG/M2 | SYSTOLIC BLOOD PRESSURE: 150 MMHG | HEART RATE: 62 BPM | DIASTOLIC BLOOD PRESSURE: 74 MMHG

## 2025-01-21 DIAGNOSIS — I10 PRIMARY HYPERTENSION: Primary | ICD-10-CM

## 2025-01-21 DIAGNOSIS — I42.0 DILATED CARDIOMYOPATHY (HCC): ICD-10-CM

## 2025-01-21 DIAGNOSIS — I25.10 CORONARY ARTERY DISEASE INVOLVING NATIVE CORONARY ARTERY OF NATIVE HEART WITHOUT ANGINA PECTORIS: ICD-10-CM

## 2025-01-21 PROCEDURE — 1036F TOBACCO NON-USER: CPT | Performed by: NUCLEAR MEDICINE

## 2025-01-21 PROCEDURE — 1124F ACP DISCUSS-NO DSCNMKR DOCD: CPT | Performed by: NUCLEAR MEDICINE

## 2025-01-21 PROCEDURE — G8427 DOCREV CUR MEDS BY ELIG CLIN: HCPCS | Performed by: NUCLEAR MEDICINE

## 2025-01-21 PROCEDURE — G8417 CALC BMI ABV UP PARAM F/U: HCPCS | Performed by: NUCLEAR MEDICINE

## 2025-01-21 PROCEDURE — 3077F SYST BP >= 140 MM HG: CPT | Performed by: NUCLEAR MEDICINE

## 2025-01-21 PROCEDURE — 99213 OFFICE O/P EST LOW 20 MIN: CPT | Performed by: NUCLEAR MEDICINE

## 2025-01-21 PROCEDURE — 1090F PRES/ABSN URINE INCON ASSESS: CPT | Performed by: NUCLEAR MEDICINE

## 2025-01-21 PROCEDURE — 1159F MED LIST DOCD IN RCRD: CPT | Performed by: NUCLEAR MEDICINE

## 2025-01-21 PROCEDURE — 3078F DIAST BP <80 MM HG: CPT | Performed by: NUCLEAR MEDICINE

## 2025-01-21 PROCEDURE — G8399 PT W/DXA RESULTS DOCUMENT: HCPCS | Performed by: NUCLEAR MEDICINE

## 2025-01-21 NOTE — PROGRESS NOTES
[facility] PHYSICIANS [place] [facility]  [facility] CARDIOLOGY  [address]  [address]  [facility] [address]  Dept: [phone]  Dept Fax: [phone]  Loc: [phone]    Visit Date: 1/21/2025    Blayne Rubin is a 79 y.o. female who presents todayfor:  Chief Complaint   Patient presents with    Follow-up    Hypertension    Coronary Artery Disease    Cardiomyopathy     Cath done  Mild CAD  Mild CMP   BP is stable  No chest pain   Baseline dyspnea  No changes in breathing  Could be better  No dizziness  No syncope      HPI:  HPI  Past Medical History:   Diagnosis Date    Arthritis     knees bilat    Cardiomyopathy (HCC)     COVID-19 2020    scar tissue from this in both lungs per pt    Kidney stone     Nephrolithiasis     Osteoporosis     Ovarian cancer (HCC) 04/18/2018    PONV (postoperative nausea and vomiting)     Thyroid disease     just found out that dr wants to see endocinroloisgt    Tremors of nervous system       Past Surgical History:   Procedure Laterality Date    CARDIAC PROCEDURE N/A 6/6/2024    Left heart cath / coronary angiography performed by Ember Sanz MD at [facility] CARDIAC CATH LAB    CHOLECYSTECTOMY, LAPAROSCOPIC  08/2001    HYSTERECTOMY, TOTAL ABDOMINAL (CERVIX REMOVED)  04/18/2018    JOINT REPLACEMENT Right 2019    knee    KNEE SURGERY  1995    arthroscopic    LITHOTRIPSY Left 4/25/2023    Left ESWL EXTRACORPOREAL SHOCK WAVE LITHOTRIPSY Left insertion performed by Cosmo Mendez MD at [facility] OR    PARATHYROID GLAND SURGERY Bilateral 8/11/2023    BILATRAL PARATHYROIDECTOMY performed by Harry Mehta MD at [facility] OR    TONSILLECTOMY  1965    URETER SURGERY Left 5/2/2023    Cystoscopy,Left ureteroscopy, laser lithotripsyleft ureteral stent placement. performed by Americo Segundo MD at [facility] OR     Family History   Problem Relation Age of Onset    Cancer Father     Mult Sclerosis Sister     Hearing Loss Maternal Grandfather      Social History     Tobacco Use    Smoking status:

## 2025-02-03 ENCOUNTER — OFFICE VISIT (OUTPATIENT)
Dept: FAMILY MEDICINE CLINIC | Age: 80
End: 2025-02-03
Payer: MEDICARE

## 2025-02-03 VITALS
DIASTOLIC BLOOD PRESSURE: 86 MMHG | OXYGEN SATURATION: 98 % | SYSTOLIC BLOOD PRESSURE: 138 MMHG | HEART RATE: 73 BPM | WEIGHT: 264 LBS | BODY MASS INDEX: 44.62 KG/M2 | RESPIRATION RATE: 16 BRPM

## 2025-02-03 DIAGNOSIS — L28.2 PRURITIC RASH: Primary | ICD-10-CM

## 2025-02-03 PROCEDURE — G8399 PT W/DXA RESULTS DOCUMENT: HCPCS | Performed by: NURSE PRACTITIONER

## 2025-02-03 PROCEDURE — 1124F ACP DISCUSS-NO DSCNMKR DOCD: CPT | Performed by: NURSE PRACTITIONER

## 2025-02-03 PROCEDURE — 99213 OFFICE O/P EST LOW 20 MIN: CPT | Performed by: NURSE PRACTITIONER

## 2025-02-03 PROCEDURE — 1159F MED LIST DOCD IN RCRD: CPT | Performed by: NURSE PRACTITIONER

## 2025-02-03 PROCEDURE — 1036F TOBACCO NON-USER: CPT | Performed by: NURSE PRACTITIONER

## 2025-02-03 PROCEDURE — G8417 CALC BMI ABV UP PARAM F/U: HCPCS | Performed by: NURSE PRACTITIONER

## 2025-02-03 PROCEDURE — 1090F PRES/ABSN URINE INCON ASSESS: CPT | Performed by: NURSE PRACTITIONER

## 2025-02-03 PROCEDURE — G8427 DOCREV CUR MEDS BY ELIG CLIN: HCPCS | Performed by: NURSE PRACTITIONER

## 2025-02-03 RX ORDER — HYDROXYZINE HYDROCHLORIDE 25 MG/1
25 TABLET, FILM COATED ORAL NIGHTLY PRN
Qty: 30 TABLET | Refills: 0 | Status: SHIPPED | OUTPATIENT
Start: 2025-02-03 | End: 2025-02-03

## 2025-02-03 RX ORDER — PREDNISONE 20 MG/1
TABLET ORAL
Qty: 15 TABLET | Refills: 0 | Status: SHIPPED | OUTPATIENT
Start: 2025-02-03 | End: 2025-02-12

## 2025-02-03 RX ORDER — HYDROXYZINE HYDROCHLORIDE 25 MG/1
12.5 TABLET, FILM COATED ORAL NIGHTLY PRN
Qty: 20 TABLET | Refills: 0 | Status: SHIPPED | OUTPATIENT
Start: 2025-02-03

## 2025-02-03 SDOH — ECONOMIC STABILITY: FOOD INSECURITY: WITHIN THE PAST 12 MONTHS, YOU WORRIED THAT YOUR FOOD WOULD RUN OUT BEFORE YOU GOT MONEY TO BUY MORE.: NEVER TRUE

## 2025-02-03 SDOH — ECONOMIC STABILITY: FOOD INSECURITY: WITHIN THE PAST 12 MONTHS, THE FOOD YOU BOUGHT JUST DIDN'T LAST AND YOU DIDN'T HAVE MONEY TO GET MORE.: NEVER TRUE

## 2025-02-03 ASSESSMENT — PATIENT HEALTH QUESTIONNAIRE - PHQ9
SUM OF ALL RESPONSES TO PHQ9 QUESTIONS 1 & 2: 0
SUM OF ALL RESPONSES TO PHQ QUESTIONS 1-9: 0
1. LITTLE INTEREST OR PLEASURE IN DOING THINGS: NOT AT ALL
2. FEELING DOWN, DEPRESSED OR HOPELESS: NOT AT ALL
SUM OF ALL RESPONSES TO PHQ QUESTIONS 1-9: 0

## 2025-02-03 ASSESSMENT — ENCOUNTER SYMPTOMS
VOMITING: 0
NAUSEA: 0
SHORTNESS OF BREATH: 0

## 2025-02-03 NOTE — PROGRESS NOTES
Blayne Rubin (:  1945) is a 79 y.o. female,Established patient, here for evaluation of the following chief complaint(s):  Rash ( Itching on breast, shoulders and down back)        Assessment & Plan   1. Pruritic rash  - Acute  - I suspect pruritus is d/t laundry detergent change  - I have advised Coco to switch back to previous detergent  - PRN hydroxyzine and prednisone for symptomatic relief of intense pruritis  - Notify office if symptoms do no improve  -     hydrOXYzine HCl (ATARAX) 25 MG tablet; Take 1 tablet by mouth nightly as needed for Itching, Disp-30 tablet, R-0Normal  -     predniSONE (DELTASONE) 20 MG tablet; Take 1 tablet by mouth 2 times daily at 0800 and 1400 for 5 days, THEN 1 tablet daily for 5 days., Disp-15 tablet, R-0Normal      Return if symptoms worsen or fail to improve.       Subjective     Patient presents for evaluation of rash and itching. Started on her upper breasts. Has since moved to her upper back, shoulders and bilateral axilla.     Rash  This is a new problem. The current episode started in the past 7 days. The problem is unchanged. The affected locations include the neck, chest, back, left shoulder, right shoulder, right axilla and left axilla. The rash is characterized by itchiness. She was exposed to a new detergent/soap. Pertinent negatives include no fever, shortness of breath or vomiting. Past treatments include antihistamine and topical steroids. The treatment provided no relief. There is no history of allergies or eczema.       Review of Systems   Constitutional:  Negative for fever.   Respiratory:  Negative for shortness of breath.    Gastrointestinal:  Negative for nausea and vomiting.   Skin:  Positive for rash.          Objective   Physical Exam  Vitals and nursing note reviewed.   Constitutional:       General: She is awake.      Appearance: Normal appearance.   HENT:      Head: Normocephalic and atraumatic.      Right Ear: Hearing and external ear

## 2025-02-25 ENCOUNTER — TRANSCRIBE ORDERS (OUTPATIENT)
Dept: ADMINISTRATIVE | Age: 80
End: 2025-02-25

## 2025-02-25 DIAGNOSIS — Z12.31 OTHER SCREENING MAMMOGRAM: Primary | ICD-10-CM

## 2025-02-28 ENCOUNTER — OFFICE VISIT (OUTPATIENT)
Dept: FAMILY MEDICINE CLINIC | Age: 80
End: 2025-02-28

## 2025-02-28 VITALS
HEART RATE: 59 BPM | DIASTOLIC BLOOD PRESSURE: 84 MMHG | RESPIRATION RATE: 16 BRPM | BODY MASS INDEX: 44.65 KG/M2 | OXYGEN SATURATION: 98 % | WEIGHT: 264.2 LBS | SYSTOLIC BLOOD PRESSURE: 122 MMHG

## 2025-02-28 DIAGNOSIS — L28.2 PRURITIC RASH: Primary | ICD-10-CM

## 2025-02-28 RX ORDER — PREDNISONE 10 MG/1
10 TABLET ORAL DAILY
Qty: 60 TABLET | Refills: 0 | Status: SHIPPED | OUTPATIENT
Start: 2025-02-28

## 2025-02-28 NOTE — PROGRESS NOTES
[facility]  [address]  [place] [address]  Dept: [phone]  Dept Fax: [phone]  Loc: [phone]     Visit Date:  2/28/2025    Provider: MARCELLO Jimenez CNP        Patient:  Blayne Rubin  YOB: 1945    HPI:     Chief Complaint   Patient presents with    Pruritis     Continued itching seen Ricky on 2/3/25- spreading - prednisone helped but once done it came back got worse       History of Present Illness  The patient is a 79-year-old female who presents for evaluation of a rash.    She was previously evaluated by Ricky Castillo CNP for a rash, which initially responded to prednisone treatment but recurred upon cessation of the medication. The rash has since spread from the upper chest area, extending over her shoulders and down her back. She describes the rash as consisting of small bumps under the skin, which are associated with itching. Her  has observed some areas of healing, but the itching persists and is occasionally unbearable. She reports no changes in her diet or medication regimen. She recalls a recent switch from [employer] to [employer] brand of laundry detergent, but re-washing her clothes did not alleviate the symptoms. She continues to use hydroxyzine for symptomatic relief of itching at bedtime only as she was advised to take half a tablet of hydroxyzine at bedtime due to its potential sedative effects.       Medications    Current Outpatient Medications:     predniSONE (DELTASONE) 10 MG tablet, Take 1 tablet by mouth daily Take 5 tabs daily for 5 days, then 4 tabs daily for 5 days, then 3 tabs daily for 3 days, then 2 tabs daily for 2 days, then 1 tab daily for 2 days., Disp: 60 tablet, Rfl: 0    hydrOXYzine HCl (ATARAX) 25 MG tablet, Take 0.5 tablets by mouth nightly as needed for Itching, Disp: 20 tablet, Rfl: 0    primidone (MYSOLINE) 50 MG tablet, Take 1.5 tablets by mouth 3 times daily

## 2025-03-01 ENCOUNTER — PATIENT MESSAGE (OUTPATIENT)
Dept: FAMILY MEDICINE CLINIC | Age: 80
End: 2025-03-01

## 2025-03-01 DIAGNOSIS — L28.2 PRURITIC RASH: ICD-10-CM

## 2025-03-07 RX ORDER — HYDROXYZINE HYDROCHLORIDE 25 MG/1
TABLET, FILM COATED ORAL
Qty: 30 TABLET | Refills: 1 | Status: SHIPPED | OUTPATIENT
Start: 2025-03-07

## 2025-03-26 ENCOUNTER — HOSPITAL ENCOUNTER (OUTPATIENT)
Dept: MAMMOGRAPHY | Age: 80
Discharge: HOME | End: 2025-03-26
Payer: MEDICARE

## 2025-03-26 ENCOUNTER — OFFICE VISIT (OUTPATIENT)
Dept: FAMILY MEDICINE CLINIC | Age: 80
End: 2025-03-26

## 2025-03-26 VITALS
WEIGHT: 265.2 LBS | RESPIRATION RATE: 18 BRPM | HEART RATE: 75 BPM | BODY MASS INDEX: 45.27 KG/M2 | HEIGHT: 64 IN | DIASTOLIC BLOOD PRESSURE: 92 MMHG | SYSTOLIC BLOOD PRESSURE: 142 MMHG | TEMPERATURE: 97.1 F | OXYGEN SATURATION: 95 %

## 2025-03-26 DIAGNOSIS — M81.0 AGE-RELATED OSTEOPOROSIS WITHOUT CURRENT PATHOLOGICAL FRACTURE: ICD-10-CM

## 2025-03-26 DIAGNOSIS — E21.0 PRIMARY HYPERPARATHYROIDISM: ICD-10-CM

## 2025-03-26 DIAGNOSIS — E53.8 B12 DEFICIENCY: ICD-10-CM

## 2025-03-26 DIAGNOSIS — M17.0 PRIMARY OSTEOARTHRITIS OF BOTH KNEES: ICD-10-CM

## 2025-03-26 DIAGNOSIS — Z12.31 OTHER SCREENING MAMMOGRAM: ICD-10-CM

## 2025-03-26 DIAGNOSIS — I10 PRIMARY HYPERTENSION: Primary | ICD-10-CM

## 2025-03-26 DIAGNOSIS — I50.22 HEART FAILURE WITH MILDLY REDUCED EJECTION FRACTION (HFMREF) (HCC): ICD-10-CM

## 2025-03-26 PROBLEM — R94.39 ABNORMAL STRESS TEST: Status: RESOLVED | Noted: 2024-05-17 | Resolved: 2025-03-26

## 2025-03-26 PROCEDURE — 77063 BREAST TOMOSYNTHESIS BI: CPT

## 2025-03-26 RX ORDER — LOSARTAN POTASSIUM 25 MG/1
25 TABLET ORAL DAILY
Qty: 90 TABLET | Refills: 3 | Status: SHIPPED | OUTPATIENT
Start: 2025-03-26

## 2025-03-26 SDOH — ECONOMIC STABILITY: FOOD INSECURITY: WITHIN THE PAST 12 MONTHS, THE FOOD YOU BOUGHT JUST DIDN'T LAST AND YOU DIDN'T HAVE MONEY TO GET MORE.: NEVER TRUE

## 2025-03-26 SDOH — ECONOMIC STABILITY: FOOD INSECURITY: WITHIN THE PAST 12 MONTHS, YOU WORRIED THAT YOUR FOOD WOULD RUN OUT BEFORE YOU GOT MONEY TO BUY MORE.: NEVER TRUE

## 2025-03-26 NOTE — PATIENT INSTRUCTIONS
Check blood pressure at home -- goal is to keep < 140/90    Consider starting sitting down exercises 2-3 times a week

## 2025-03-26 NOTE — PROGRESS NOTES
Blayne Rubin (:  1945) is a 79 y.o. female,Established patient, here for evaluation of the following chief complaint(s):  6 Month Follow-Up      Assessment & Plan   ASSESSMENT/PLAN:  1. Primary hypertension  -     losartan (COZAAR) 25 MG tablet; Take 1 tablet by mouth daily, Disp-90 tablet, R-3Normal  -     Vitamin B12 & Folate; Future  -     CBC with Auto Differential; Future  -     Comprehensive Metabolic Panel; Future  -     Lipid Panel; Future  -     PTH, Intact; Future  -     Magnesium; Future  -     TSH reflex to FT4; Future  2. B12 deficiency  -     Vitamin B12 & Folate; Future  -     CBC with Auto Differential; Future  -     Comprehensive Metabolic Panel; Future  -     Lipid Panel; Future  -     PTH, Intact; Future  -     Magnesium; Future  -     TSH reflex to FT4; Future  3. Primary hyperparathyroidism  -     Vitamin B12 & Folate; Future  -     CBC with Auto Differential; Future  -     Comprehensive Metabolic Panel; Future  -     Lipid Panel; Future  -     PTH, Intact; Future  -     Magnesium; Future  -     TSH reflex to FT4; Future  4. Primary osteoarthritis of both knees  5. 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 Age-related osteoporosis without current pathological fracture    Well controlled HTN  B12 supplementation ongoing   Continue using Voltaren gel as helps  Pool exercises and sitting down exercises encouraged.   Continues follow up with cardiology    Return in about 6 months (around 2025) for AWV.         Subjective   SUBJECTIVE/OBJECTIVE:  HPI  Patient overall doing okay but mobility is affected.    Knee replacemet needed on left side.  Reports numbness in chins.  Rigth sciatic help -- days ago had flare up but seemed to calm down  Prednisone helped and then advil used to calm things down.    Hyperparathyroidism -- seems stable.  S/p removal gland removal .  B12 deficiency -- doing well. Needs level updated.   CHF stable, follows with

## 2025-03-30 PROBLEM — I10 PRIMARY HYPERTENSION: Status: ACTIVE | Noted: 2025-03-30

## 2025-03-30 RX ORDER — SODIUM CHLORIDE 9 MG/ML
INJECTION, SOLUTION INTRAVENOUS CONTINUOUS
OUTPATIENT
Start: 2025-04-29

## 2025-03-30 RX ORDER — FAMOTIDINE 10 MG/ML
20 INJECTION, SOLUTION INTRAVENOUS
OUTPATIENT
Start: 2025-04-29

## 2025-03-30 RX ORDER — EPINEPHRINE 1 MG/ML
0.3 INJECTION, SOLUTION, CONCENTRATE INTRAVENOUS PRN
OUTPATIENT
Start: 2025-04-29

## 2025-03-30 RX ORDER — HYDROCORTISONE SODIUM SUCCINATE 100 MG/2ML
100 INJECTION INTRAMUSCULAR; INTRAVENOUS
OUTPATIENT
Start: 2025-04-29

## 2025-03-30 RX ORDER — ACETAMINOPHEN 325 MG/1
650 TABLET ORAL
OUTPATIENT
Start: 2025-04-29

## 2025-03-30 RX ORDER — ALBUTEROL SULFATE 90 UG/1
4 INHALANT RESPIRATORY (INHALATION) PRN
OUTPATIENT
Start: 2025-04-29

## 2025-03-30 RX ORDER — ONDANSETRON 2 MG/ML
8 INJECTION INTRAMUSCULAR; INTRAVENOUS
OUTPATIENT
Start: 2025-04-29

## 2025-03-30 RX ORDER — DIPHENHYDRAMINE HYDROCHLORIDE 50 MG/ML
50 INJECTION, SOLUTION INTRAMUSCULAR; INTRAVENOUS
OUTPATIENT
Start: 2025-04-29

## 2025-04-01 ENCOUNTER — PATIENT MESSAGE (OUTPATIENT)
Dept: FAMILY MEDICINE CLINIC | Age: 80
End: 2025-04-01

## 2025-04-01 DIAGNOSIS — I10 PRIMARY HYPERTENSION: ICD-10-CM

## 2025-04-03 RX ORDER — LOSARTAN POTASSIUM 50 MG/1
50 TABLET ORAL DAILY
Qty: 90 TABLET | Refills: 3 | Status: SHIPPED | OUTPATIENT
Start: 2025-04-03

## 2025-04-23 ENCOUNTER — HOSPITAL ENCOUNTER (OUTPATIENT)
Age: 80
Discharge: HOME | End: 2025-04-23
Payer: MEDICARE

## 2025-04-23 ENCOUNTER — TELEPHONE (OUTPATIENT)
Dept: FAMILY MEDICINE CLINIC | Age: 80
End: 2025-04-23

## 2025-04-23 DIAGNOSIS — E21.0 PRIMARY HYPERPARATHYROIDISM: ICD-10-CM

## 2025-04-23 DIAGNOSIS — I10 PRIMARY HYPERTENSION: ICD-10-CM

## 2025-04-23 DIAGNOSIS — E53.8 B12 DEFICIENCY: ICD-10-CM

## 2025-04-23 LAB
ALBUMIN SERPL BCG-MCNC: 4 G/DL (ref 3.4–4.9)
ALP SERPL-CCNC: 69 U/L (ref 38–126)
ALT SERPL W/O P-5'-P-CCNC: 13 U/L (ref 10–35)
ANION GAP SERPL CALC-SCNC: 9 MEQ/L (ref 8–16)
AST SERPL-CCNC: 17 U/L (ref 10–35)
BASOPHILS ABSOLUTE: 0.1 THOU/MM3 (ref 0–0.1)
BASOPHILS NFR BLD AUTO: 0.8 %
BILIRUB SERPL-MCNC: 0.3 MG/DL (ref 0.3–1.2)
BUN SERPL-MCNC: 11 MG/DL (ref 8–23)
CALCIUM SERPL-MCNC: 10.1 MG/DL (ref 8.8–10.2)
CHLORIDE SERPL-SCNC: 106 MEQ/L (ref 98–111)
CHOLEST SERPL-MCNC: 209 MG/DL (ref 100–199)
CO2 SERPL-SCNC: 25 MEQ/L (ref 22–29)
CREAT SERPL-MCNC: 0.8 MG/DL (ref 0.5–0.9)
DEPRECATED RDW RBC AUTO: 47 FL (ref 35–45)
EOSINOPHIL NFR BLD AUTO: 1.8 %
EOSINOPHILS ABSOLUTE: 0.1 THOU/MM3 (ref 0–0.4)
ERYTHROCYTE [DISTWIDTH] IN BLOOD BY AUTOMATED COUNT: 13.3 % (ref 11.5–14.5)
FOLATE SERPL-MCNC: 5.5 NG/ML (ref 4.6–34.8)
GFR SERPL CREATININE-BSD FRML MDRD: 76 ML/MIN/1.73M2
GLUCOSE SERPL-MCNC: 95 MG/DL (ref 74–109)
HCT VFR BLD AUTO: 42.4 % (ref 37–47)
HDLC SERPL-MCNC: 60 MG/DL
HGB BLD-MCNC: 14.1 GM/DL (ref 12–16)
IMM GRANULOCYTES # BLD AUTO: 0.02 THOU/MM3 (ref 0–0.07)
IMM GRANULOCYTES NFR BLD AUTO: 0.3 %
LDLC SERPL CALC-MCNC: 131 MG/DL
LYMPHOCYTES ABSOLUTE: 2 THOU/MM3 (ref 1–4.8)
LYMPHOCYTES NFR BLD AUTO: 30.3 %
MAGNESIUM SERPL-MCNC: 2 MG/DL (ref 1.6–2.6)
MCH RBC QN AUTO: 32 PG (ref 26–33)
MCHC RBC AUTO-ENTMCNC: 33.3 GM/DL (ref 32.2–35.5)
MCV RBC AUTO: 96.1 FL (ref 81–99)
MONOCYTES ABSOLUTE: 0.4 THOU/MM3 (ref 0.4–1.3)
MONOCYTES NFR BLD AUTO: 5.9 %
NEUTROPHILS ABSOLUTE: 4 THOU/MM3 (ref 1.8–7.7)
NEUTROPHILS NFR BLD AUTO: 60.9 %
NRBC BLD AUTO-RTO: 0 /100 WBC
PLATELET # BLD AUTO: 209 THOU/MM3 (ref 130–400)
PMV BLD AUTO: 10.8 FL (ref 9.4–12.4)
POTASSIUM SERPL-SCNC: 4.4 MEQ/L (ref 3.5–5.2)
PROT SERPL-MCNC: 6.5 G/DL (ref 6.4–8.3)
PTH-INTACT SERPL-MCNC: 140 PG/ML (ref 15–65)
RBC # BLD AUTO: 4.41 MILL/MM3 (ref 4.2–5.4)
SODIUM SERPL-SCNC: 140 MEQ/L (ref 135–145)
TRIGL SERPL-MCNC: 90 MG/DL (ref 0–199)
TSH SERPL DL<=0.05 MIU/L-ACNC: 1.2 UIU/ML (ref 0.27–4.2)
VIT B12 SERPL-MCNC: 572 PG/ML (ref 232–1245)
WBC # BLD AUTO: 6.6 THOU/MM3 (ref 4.8–10.8)

## 2025-04-23 PROCEDURE — 83735 ASSAY OF MAGNESIUM: CPT

## 2025-04-23 PROCEDURE — 82746 ASSAY OF FOLIC ACID SERUM: CPT

## 2025-04-23 PROCEDURE — 84443 ASSAY THYROID STIM HORMONE: CPT

## 2025-04-23 PROCEDURE — 85025 COMPLETE CBC W/AUTO DIFF WBC: CPT

## 2025-04-23 PROCEDURE — 80061 LIPID PANEL: CPT

## 2025-04-23 PROCEDURE — 36415 COLL VENOUS BLD VENIPUNCTURE: CPT

## 2025-04-23 PROCEDURE — 82607 VITAMIN B-12: CPT

## 2025-04-23 PROCEDURE — 83970 ASSAY OF PARATHORMONE: CPT

## 2025-04-23 PROCEDURE — 80053 COMPREHEN METABOLIC PANEL: CPT

## 2025-04-23 NOTE — TELEPHONE ENCOUNTER
Patient stopped in the office today to drop off her blood pressure readings for Dr. Lewis. Scanned into chart. Please review.

## 2025-04-24 ENCOUNTER — RESULTS FOLLOW-UP (OUTPATIENT)
Dept: FAMILY MEDICINE CLINIC | Age: 80
End: 2025-04-24

## 2025-04-24 ENCOUNTER — PATIENT MESSAGE (OUTPATIENT)
Dept: FAMILY MEDICINE CLINIC | Age: 80
End: 2025-04-24

## 2025-04-24 DIAGNOSIS — M81.0 AGE-RELATED OSTEOPOROSIS WITHOUT CURRENT PATHOLOGICAL FRACTURE: ICD-10-CM

## 2025-04-24 DIAGNOSIS — I10 PRIMARY HYPERTENSION: Primary | ICD-10-CM

## 2025-04-24 DIAGNOSIS — L30.8 PRURITIC DERMATITIS: ICD-10-CM

## 2025-04-24 DIAGNOSIS — E21.0 PRIMARY HYPERPARATHYROIDISM: Primary | ICD-10-CM

## 2025-04-24 RX ORDER — LOSARTAN POTASSIUM 100 MG/1
100 TABLET ORAL DAILY
Qty: 90 TABLET | Refills: 3 | Status: SHIPPED | OUTPATIENT
Start: 2025-04-24 | End: 2025-04-24

## 2025-04-24 RX ORDER — AMLODIPINE BESYLATE 5 MG/1
5 TABLET ORAL DAILY
Qty: 30 TABLET | Refills: 3 | Status: SHIPPED | OUTPATIENT
Start: 2025-04-24

## 2025-04-24 NOTE — TELEPHONE ENCOUNTER
Blood pressure log shows insufficient control.  I recommend increasing losartan to 100 mg daily.  She may use 2 of the 50 mg until she needs the 100.  I have already sent losartan 100 to [facility] in [place].    She may continue to check blood pressures but again it may take a couple of weeks for the new dose to show full effect.  A recheck with nurse in 1 month along with a log of blood pressures the week prior is recommended.    Please help the patient make a 6-month appointment which is due after September 6th.  Thank you.

## 2025-04-25 RX ORDER — HYDROXYZINE HYDROCHLORIDE 25 MG/1
25 TABLET, FILM COATED ORAL EVERY 8 HOURS PRN
Qty: 30 TABLET | Refills: 1 | Status: SHIPPED | OUTPATIENT
Start: 2025-04-25 | End: 2025-05-25

## 2025-04-28 ENCOUNTER — TELEPHONE (OUTPATIENT)
Dept: FAMILY MEDICINE CLINIC | Age: 80
End: 2025-04-28

## 2025-04-28 DIAGNOSIS — R21 RASH: Primary | ICD-10-CM

## 2025-04-28 RX ORDER — HYDROCORTISONE 25 MG/G
CREAM TOPICAL
Qty: 28 G | Refills: 0 | Status: SHIPPED | OUTPATIENT
Start: 2025-04-28

## 2025-04-28 NOTE — TELEPHONE ENCOUNTER
Coco called in and is wanting to know if you can send something in for the itching she is having.  She stated it is unbearable and she almost went to the ER for it.  She has red bumps under her skin and they are breaking open when she is scratching.  She has been taking the Hydroxyzine and it is not helping. She has an appointment with you on 5/23.  Please advise.

## 2025-04-28 NOTE — TELEPHONE ENCOUNTER
Please make sure she is doing zyrtec 10 mg BID as well, which was previously recommended.  Hydroxyzine may increase to 50 mg q6 hours as needed.    -- hydrocortisone 2.5 % was sent to pharmacy to apply twice a day   -- in between, I recommend she use CeraVe itch relief OTC  -- cooling rags on the skin can also be helpful  -- remind her that scratching makes it worse    If not improving after 3-4 days, then dermatology eval recommended.

## 2025-05-01 DIAGNOSIS — G24.3 CERVICAL DYSTONIA: ICD-10-CM

## 2025-05-01 DIAGNOSIS — G25.0 BENIGN ESSENTIAL TREMOR: ICD-10-CM

## 2025-05-01 RX ORDER — PRIMIDONE 50 MG/1
TABLET ORAL
Qty: 405 TABLET | Refills: 3 | Status: SHIPPED | OUTPATIENT
Start: 2025-05-01

## 2025-05-01 NOTE — TELEPHONE ENCOUNTER
Blayne Rubin called requesting a refill on the following medications:  Requested Prescriptions     Pending Prescriptions Disp Refills    primidone (MYSOLINE) 50 MG tablet [Pharmacy Med Name: PRIMIDONE 50MG      TAB] 405 tablet 0     Sig: TAKE 1 & 1/2 (ONE & ONE-HALF) TABLETS BY MOUTH THREE TIMES DAILY       Date of last visit: 3/26/2025  Date of next visit (if applicable):9/15/2025  Date of last refill: 9/6/24  Pharmacy Name: Melissa Campuzano, RICN

## 2025-05-02 RX ORDER — PREDNISONE 20 MG/1
40 TABLET ORAL DAILY
Qty: 10 TABLET | Refills: 0 | Status: SHIPPED | OUTPATIENT
Start: 2025-05-02 | End: 2025-05-07

## 2025-05-06 ENCOUNTER — OFFICE VISIT (OUTPATIENT)
Dept: FAMILY MEDICINE CLINIC | Age: 80
End: 2025-05-06

## 2025-05-06 ENCOUNTER — TELEPHONE (OUTPATIENT)
Dept: FAMILY MEDICINE CLINIC | Age: 80
End: 2025-05-06

## 2025-05-06 VITALS
OXYGEN SATURATION: 92 % | HEART RATE: 66 BPM | DIASTOLIC BLOOD PRESSURE: 82 MMHG | WEIGHT: 270 LBS | RESPIRATION RATE: 18 BRPM | BODY MASS INDEX: 44.98 KG/M2 | HEIGHT: 65 IN | SYSTOLIC BLOOD PRESSURE: 112 MMHG | TEMPERATURE: 97.4 F

## 2025-05-06 DIAGNOSIS — L30.9 DERMATITIS: Primary | ICD-10-CM

## 2025-05-06 RX ORDER — TRIAMCINOLONE ACETONIDE 40 MG/ML
80 INJECTION, SUSPENSION INTRA-ARTICULAR; INTRAMUSCULAR ONCE
Status: CANCELLED | OUTPATIENT
Start: 2025-05-06 | End: 2025-05-06

## 2025-05-06 RX ORDER — METHYLPREDNISOLONE ACETATE 80 MG/ML
80 INJECTION, SUSPENSION INTRA-ARTICULAR; INTRALESIONAL; INTRAMUSCULAR; SOFT TISSUE ONCE
Status: COMPLETED | OUTPATIENT
Start: 2025-05-06 | End: 2025-05-06

## 2025-05-06 RX ORDER — METHYLPREDNISOLONE ACETATE 40 MG/ML
40 INJECTION, SUSPENSION INTRA-ARTICULAR; INTRALESIONAL; INTRAMUSCULAR; SOFT TISSUE ONCE
Status: CANCELLED | OUTPATIENT
Start: 2025-05-06 | End: 2025-05-06

## 2025-05-06 RX ADMIN — METHYLPREDNISOLONE ACETATE 80 MG: 80 INJECTION, SUSPENSION INTRA-ARTICULAR; INTRALESIONAL; INTRAMUSCULAR; SOFT TISSUE at 15:47

## 2025-05-06 NOTE — PROGRESS NOTES
[facility]  [address]  [facility] [address]  Dept: [phone]  Dept Fax: [phone]  Loc: [phone]     Visit Date:  5/6/2025    Provider: MARCELLO Jimenez CNP        Patient:  Blayne Rubin  YOB: 1945    HPI:     Chief Complaint   Patient presents with    Other     Patient here for rash on her neck, back, chest, arms and waist. It started a couple months ago. Patient has tried clear calamine which eases the itching for about 3 hours. They have also tried natural aloe vera which didn't help. States that the hydroxyzine and hydrocortisone doesn't seem to help much.       History of Present Illness  The patient is a 79-year-old female who presents for evaluation of a persistent rash.    She reports that the rash initially subsided slightly after discontinuing her medication due to running out of supply, approximately in February or March. However, the rash reappeared within 2 to 3 days and did not fully resolve, although the itching was less intense. The rash, characterized by numerous small red spots, is most severe on her back and has progressively worsened despite various interventions. She has an upcoming appointment with dermatology on 05/20/2025.    For symptomatic relief, she has been using calamine lotion, which allows her to sleep for about 3 hours. She continues to take hydroxyzine, but it has not been effective in managing the symptoms. Additionally, she has tried aloe vera, rubbing alcohol, and extra strength itch relief. She continues to take Zyrtec at night.    Her losartan was discontinued due to concern it could be causing the rash.       Medications    Current Outpatient Medications:     primidone (MYSOLINE) 50 MG tablet, TAKE 1 & 1/2 (ONE & ONE-HALF) TABLETS BY MOUTH THREE TIMES DAILY, Disp: 405 tablet, Rfl: 3    hydrocortisone 2.5 % cream, Apply topically 2 times daily for up to 2 weeks.,

## 2025-05-06 NOTE — TELEPHONE ENCOUNTER
Please call dermatologist office where she was referred and see if they can get her in any quicker.

## 2025-05-07 NOTE — TELEPHONE ENCOUNTER
Call placed to Dr Andrew's office x 2  They have no record of patient or an appointment scheduled  Verified with patient who appointment was with  She stated she has written down that her appointment is with Dr Andrew 5/20/25 @ 1pm    She will call in the morning to see what is going on and call back with update

## 2025-05-08 NOTE — TELEPHONE ENCOUNTER
Patient states she spoke with Dr Andrew's office  They were able to find her appointment and moved it up to May 14

## 2025-05-23 ENCOUNTER — CLINICAL SUPPORT (OUTPATIENT)
Dept: FAMILY MEDICINE CLINIC | Age: 80
End: 2025-05-23

## 2025-05-23 VITALS
OXYGEN SATURATION: 98 % | WEIGHT: 268 LBS | HEART RATE: 85 BPM | RESPIRATION RATE: 24 BRPM | DIASTOLIC BLOOD PRESSURE: 86 MMHG | SYSTOLIC BLOOD PRESSURE: 144 MMHG | BODY MASS INDEX: 45.29 KG/M2

## 2025-05-23 DIAGNOSIS — F41.8 DEPRESSION WITH ANXIETY: Primary | ICD-10-CM

## 2025-05-23 DIAGNOSIS — I10 PRIMARY HYPERTENSION: ICD-10-CM

## 2025-05-23 RX ORDER — AMLODIPINE BESYLATE 10 MG/1
10 TABLET ORAL DAILY
Qty: 30 TABLET | Refills: 0 | Status: SHIPPED | OUTPATIENT
Start: 2025-05-23

## 2025-05-23 RX ORDER — CITALOPRAM HYDROBROMIDE 10 MG/1
10 TABLET ORAL DAILY
Qty: 90 TABLET | Refills: 0 | Status: SHIPPED | OUTPATIENT
Start: 2025-05-23

## 2025-05-23 RX ORDER — LORAZEPAM 0.5 MG/1
0.5 TABLET ORAL EVERY 8 HOURS PRN
Qty: 30 TABLET | Refills: 0 | Status: SHIPPED | OUTPATIENT
Start: 2025-05-23 | End: 2025-06-22

## 2025-05-23 NOTE — PROGRESS NOTES
as cycling and leg lifts to improve her muscle strength.    She has never been prescribed medication for depression or anxiety in the past.       Medications    Current Outpatient Medications:     amLODIPine (NORVASC) 10 MG tablet, Take 1 tablet by mouth daily, Disp: 30 tablet, Rfl: 0    citalopram (CELEXA) 10 MG tablet, Take 1 tablet by mouth daily, Disp: 90 tablet, Rfl: 0    LORazepam (ATIVAN) 0.5 MG tablet, Take 1 tablet by mouth every 8 hours as needed for Anxiety for up to 30 days. Max Daily Amount: 1.5 mg, Disp: 30 tablet, Rfl: 0    primidone (MYSOLINE) 50 MG tablet, TAKE 1 & 1/2 (ONE & ONE-HALF) TABLETS BY MOUTH THREE TIMES DAILY, Disp: 405 tablet, Rfl: 3    hydrocortisone 2.5 % cream, Apply topically 2 times daily for up to 2 weeks., Disp: 28 g, Rfl: 0    hydrOXYzine HCl (ATARAX) 25 MG tablet, Take 1 tablet by mouth every 8 hours as needed for Itching, Disp: 30 tablet, Rfl: 1    albuterol sulfate HFA (VENTOLIN HFA) 108 (90 Base) MCG/ACT inhaler, Inhale 2 puffs into the lungs 4 times daily as needed for Wheezing, Disp: 18 g, Rfl: 1    oxyBUTYnin (DITROPAN-XL) 10 MG extended release tablet, Take 1 tablet by mouth daily, Disp: , Rfl:     Handicap Placard MISC, by Does not apply route Expiration in 5 years, Disp: 1 each, Rfl: 0    Allergies:  is allergic to latex.    Past Medical History   has a past medical history of Arthritis, Cardiomyopathy (HCC), COVID-19, Kidney stone, Nephrolithiasis, Osteoporosis, Ovarian cancer (HCC), PONV (postoperative nausea and vomiting), Primary hypertension, Thyroid disease, and Tremors of nervous system.    Subjective:      Review of Systems  As noted in HPI    Objective:     BP (!) 144/86 (BP Site: Left Upper Arm, Patient Position: Sitting, BP Cuff Size: Large Adult)   Pulse 85   Resp 24   Wt 121.6 kg (268 lb)   SpO2 98%   BMI 45.29 kg/m²     Physical Exam  Constitutional:       Appearance: Normal appearance. She is normal weight.   HENT:      Head: Normocephalic.

## 2025-06-26 ENCOUNTER — HOSPITAL ENCOUNTER (OUTPATIENT)
Age: 80
Discharge: HOME | End: 2025-06-26
Payer: MEDICARE

## 2025-06-26 ENCOUNTER — TELEPHONE (OUTPATIENT)
Dept: FAMILY MEDICINE CLINIC | Age: 80
End: 2025-06-26

## 2025-06-26 ENCOUNTER — HOSPITAL ENCOUNTER (OUTPATIENT)
Dept: GENERAL RADIOLOGY | Age: 80
Discharge: HOME | End: 2025-06-26
Payer: MEDICARE

## 2025-06-26 DIAGNOSIS — L29.89 PRURITUS SENILIS: ICD-10-CM

## 2025-06-26 DIAGNOSIS — Z79.899 HIGH RISK MEDICATION USE: ICD-10-CM

## 2025-06-26 DIAGNOSIS — R53.82 CHRONIC FATIGUE: ICD-10-CM

## 2025-06-26 PROCEDURE — 71046 X-RAY EXAM CHEST 2 VIEWS: CPT

## 2025-06-26 NOTE — TELEPHONE ENCOUNTER
PICC line flushed with 20cc of normal saline before and after infusion. Discharged home ambulatory with walker with .   Pt stopped in office today and brought Bp readings for provider to view and either increase or sustain use of medications. Pt stated on phone that she only has one pill left, she is scheduled to see Trish tomorrow and would wait until then. No further concerns voiced.

## 2025-06-27 ENCOUNTER — OFFICE VISIT (OUTPATIENT)
Dept: FAMILY MEDICINE CLINIC | Age: 80
End: 2025-06-27

## 2025-06-27 VITALS
OXYGEN SATURATION: 97 % | HEART RATE: 64 BPM | DIASTOLIC BLOOD PRESSURE: 80 MMHG | WEIGHT: 268 LBS | HEIGHT: 65 IN | RESPIRATION RATE: 18 BRPM | SYSTOLIC BLOOD PRESSURE: 138 MMHG | BODY MASS INDEX: 44.65 KG/M2 | TEMPERATURE: 97.3 F

## 2025-06-27 DIAGNOSIS — I10 PRIMARY HYPERTENSION: Primary | ICD-10-CM

## 2025-06-27 DIAGNOSIS — F41.8 DEPRESSION WITH ANXIETY: ICD-10-CM

## 2025-06-27 RX ORDER — HYDROXYZINE HYDROCHLORIDE 50 MG/1
50 TABLET, FILM COATED ORAL 2 TIMES DAILY
COMMUNITY
Start: 2025-06-10

## 2025-06-27 RX ORDER — AMLODIPINE BESYLATE 10 MG/1
10 TABLET ORAL DAILY
Qty: 90 TABLET | Refills: 1 | Status: SHIPPED | OUTPATIENT
Start: 2025-06-27

## 2025-06-27 RX ORDER — LOSARTAN POTASSIUM 25 MG/1
25 TABLET ORAL DAILY
Qty: 90 TABLET | Refills: 1 | Status: SHIPPED | OUTPATIENT
Start: 2025-06-27

## 2025-06-27 RX ORDER — TRIAMCINOLONE ACETONIDE 1 MG/G
CREAM TOPICAL
COMMUNITY
Start: 2025-06-10

## 2025-06-27 NOTE — PROGRESS NOTES
[facility] PROFESSIONAL [facility]  [address]  [facility] [address]  Dept: [phone]  Dept Fax: [phone]  Loc: [phone]     Visit Date:  6/27/2025    Provider: MARCELLO Jimenez CNP        Patient:  Blayne Rubin  YOB: 1945    HPI:     Chief Complaint   Patient presents with    Medication Check     Pt feels like her BP is better but feels like diastolic is still too high.        History of Present Illness  The patient is a 79-year-old female who presents for a medication follow-up.    She has been monitoring her blood pressure at home and is concerned that the diastolic readings have been consistently high, averaging around 88. She reports no symptoms of dizziness, chest pain, or palpitations. However, she does experience shortness of breath, which she attributes to her usual condition. Efforts are being made to increase physical activity, although she finds it challenging. There are no issues with swelling in her feet. Her current medication regimen includes amlodipine 10 mg. She was previously on losartan, which was discontinued due to a suspected link to itching, later ruled out.    She was prescribed two anxiety medications, one of which she takes daily and believes it has helped with her anxiety. Concerns persist, especially regarding her grandson and .    Dr. 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 She is using a prescribed cream, hydroxyzine, and Benadryl. Two injections were administered in her abdomen 2 days ago, and she was advised to return in 2 weeks for two more injections. Steroids were discontinued as the doctor did not want to keep her on them long-term.       Medications    Current Outpatient Medications:     hydrOXYzine HCl (ATARAX) 50 MG tablet, Take 1 tablet by mouth 2 times daily, Disp: , Rfl:     triamcinolone (KENALOG) 0.1 % cream, Apply

## 2025-07-11 ENCOUNTER — OFFICE VISIT (OUTPATIENT)
Dept: FAMILY MEDICINE CLINIC | Age: 80
End: 2025-07-11

## 2025-07-11 VITALS
BODY MASS INDEX: 42.35 KG/M2 | SYSTOLIC BLOOD PRESSURE: 140 MMHG | DIASTOLIC BLOOD PRESSURE: 80 MMHG | OXYGEN SATURATION: 98 % | WEIGHT: 250.6 LBS | HEART RATE: 108 BPM

## 2025-07-11 DIAGNOSIS — R06.02 SHORTNESS OF BREATH: ICD-10-CM

## 2025-07-11 DIAGNOSIS — M54.6 ACUTE BILATERAL THORACIC BACK PAIN: ICD-10-CM

## 2025-07-11 DIAGNOSIS — I10 PRIMARY HYPERTENSION: Primary | ICD-10-CM

## 2025-07-11 DIAGNOSIS — M62.81 MUSCLE WEAKNESS (GENERALIZED): ICD-10-CM

## 2025-07-11 RX ORDER — FLUTICASONE PROPIONATE 110 UG/1
1 AEROSOL, METERED RESPIRATORY (INHALATION) 2 TIMES DAILY
Qty: 12 G | Refills: 0 | Status: SHIPPED | OUTPATIENT
Start: 2025-07-11

## 2025-07-11 NOTE — PROGRESS NOTES
[facility] PROFESSIONAL [facility]  [address]  [facility] [address]  Dept: [phone]  Dept Fax: [phone]  Loc: [phone]     Visit Date:  7/11/2025    Provider: MARCELLO Jimenez CNP        Patient:  Blayne Rubin  YOB: 1945    HPI:     Chief Complaint   Patient presents with    Hypertension     No concerns voiced        History of Present Illness  The patient is a 79-year-old female who presents for a follow-up on her blood pressure, itching, shortness of breath, muscle weakness, and weight loss.    She reports that the addition of losartan to her treatment regimen has been beneficial. She is not experiencing any dizziness or exacerbation of itching. She also does not report any chest pain, heart palpitations, or breathing issues. Her anxiety levels have improved, and she is less prone to worrying.    She experiences severe itching, particularly on her back, which intensifies at night. This has led to frequent scratching during sleep. She has received an injection for her itching, which has significantly reduced the symptom. She believes this treatment was approved for acne and eczema over 5 years ago and is now also approved for autoimmune conditions. She anticipates receiving the medication at home for self-administration in the future.    She has been experiencing shortness of breath since michelle COVID-19 in 2019. A chest x-ray was ordered by Dr. Andrew on 06/26/2025, which showed mild cardiomegaly but no lung issues. She underwent a pulmonary function test in 2022, ordered by Dr. Duran. She uses albuterol as needed.    She reports sudden onset of severe back and rib pain during activities, which subsides upon sitting down. She does not experience any heartburn-like symptoms. She feels that she needs to strengthen her muscles and is considering physical therapy.    Her weight has decreased from 270 to 250 pounds, even

## 2025-07-28 ENCOUNTER — HOSPITAL ENCOUNTER (OUTPATIENT)
Dept: PHYSICAL THERAPY | Age: 80
Setting detail: THERAPIES SERIES
Discharge: HOME | End: 2025-07-28
Payer: MEDICARE

## 2025-07-28 PROCEDURE — 97110 THERAPEUTIC EXERCISES: CPT

## 2025-07-28 PROCEDURE — 97161 PT EVAL LOW COMPLEX 20 MIN: CPT

## 2025-07-28 NOTE — THERAPY EVALUATION
[facility]  PHYSICAL THERAPY  [x] EVALUATION  [] DAILY NOTE (LAND) [] DAILY NOTE (AQUATIC ) [] PROGRESS NOTE [] DISCHARGE NOTE    [] [facility] [place]   [x] [place] [facility] [place]    [] [facility]   [] [facility]    Date: 2025  Patient Name:  Blayne Rubin  : 1945  MRN: [id number]  CSN: [id number]    Referring Practitioner Ana Maria Suazo, APRN - CNP [id number]      Diagnosis  Diagnoses       M54.6 (ICD-10-CM) - Acute bilateral thoracic back pain    M62.81 (ICD-10-CM) - Muscle weakness (generalized)           Treatment Diagnosis M54.6  Thoracic Pain  R53.1 Weakness   Date of Evaluation 25   Additional Pertinent History Blayne Rubin has a past medical history of Arthritis, Cardiomyopathy (HCC), COVID-19, Kidney stone, Nephrolithiasis, Osteoporosis, Ovarian cancer (HCC), PONV (postoperative nausea and vomiting), Primary hypertension, Thyroid disease, and Tremors of nervous system.  she has a past surgical history that includes Tonsillectomy (); knee surgery (); Cholecystectomy, laparoscopic (2001); Hysterectomy, total abdominal (2018); joint replacement (Right, ); Lithotripsy (Left, 2023); Ureter surgery (Left, 2023); Parathyroid gland surgery (Bilateral, 2023); and Cardiac procedure (N/A, 2024).     Allergies Allergies   Allergen Reactions    Latex Rash      Medications   Current Outpatient Medications:     fluticasone (FLOVENT HFA) 110 MCG/ACT inhaler, Inhale 1 puff into the lungs 2 times daily, Disp: 12 g, Rfl: 0    hydrOXYzine HCl (ATARAX) 50 MG tablet, Take 1 tablet by mouth 2 times daily, Disp: , Rfl:     triamcinolone (KENALOG) 0.1 % cream, Apply topically, Disp: , Rfl:     losartan (COZAAR) 25 MG tablet, Take 1 tablet by mouth daily, Disp: 90 tablet, Rfl: 1    amLODIPine (NORVASC) 10 MG tablet, Take 1 tablet by mouth daily, Disp: 90 tablet, Rfl: 1    citalopram (CELEXA) 10 MG

## 2025-08-01 ENCOUNTER — HOSPITAL ENCOUNTER (OUTPATIENT)
Dept: PHYSICAL THERAPY | Age: 80
Setting detail: THERAPIES SERIES
End: 2025-08-01
Payer: MEDICARE

## 2025-08-06 ENCOUNTER — HOSPITAL ENCOUNTER (OUTPATIENT)
Dept: PHYSICAL THERAPY | Age: 80
Setting detail: THERAPIES SERIES
Discharge: HOME | End: 2025-08-06
Payer: MEDICARE

## 2025-08-06 PROCEDURE — 97110 THERAPEUTIC EXERCISES: CPT

## 2025-08-08 ENCOUNTER — HOSPITAL ENCOUNTER (OUTPATIENT)
Dept: PHYSICAL THERAPY | Age: 80
Setting detail: THERAPIES SERIES
Discharge: HOME | End: 2025-08-08
Payer: MEDICARE

## 2025-08-08 PROCEDURE — 97110 THERAPEUTIC EXERCISES: CPT

## 2025-08-11 ENCOUNTER — HOSPITAL ENCOUNTER (OUTPATIENT)
Dept: PHYSICAL THERAPY | Age: 80
Setting detail: THERAPIES SERIES
Discharge: HOME | End: 2025-08-11
Payer: MEDICARE

## 2025-08-11 PROCEDURE — 97110 THERAPEUTIC EXERCISES: CPT

## 2025-08-13 ENCOUNTER — HOSPITAL ENCOUNTER (OUTPATIENT)
Dept: PHYSICAL THERAPY | Age: 80
Setting detail: THERAPIES SERIES
Discharge: HOME | End: 2025-08-13
Payer: MEDICARE

## 2025-08-13 PROCEDURE — 97110 THERAPEUTIC EXERCISES: CPT

## 2025-08-18 ENCOUNTER — HOSPITAL ENCOUNTER (OUTPATIENT)
Dept: PHYSICAL THERAPY | Age: 80
Setting detail: THERAPIES SERIES
Discharge: HOME | End: 2025-08-18
Payer: MEDICARE

## 2025-08-18 PROCEDURE — 97110 THERAPEUTIC EXERCISES: CPT

## 2025-08-25 ENCOUNTER — APPOINTMENT (OUTPATIENT)
Dept: PHYSICAL THERAPY | Age: 80
End: 2025-08-25
Payer: MEDICARE

## (undated) DEVICE — STRIP,CLOSURE,WOUND,MEDI-STRIP,1/2X4: Brand: MEDLINE

## (undated) DEVICE — PROBE 8225101 5PK STD PRASS FL TIP ROHS

## (undated) DEVICE — SUTURE VCRL SZ 3-0 L18IN ABSRB VLT SUTUPAK PRECUT W/O NDL J104T

## (undated) DEVICE — 150CM STANDARD JWIRE

## (undated) DEVICE — CARDIAC CATH LAB PACK LF

## (undated) DEVICE — DISSECTOR ENDOSCP L21CM TIP CURVATURE 40DEG FN CRV JAW VES

## (undated) DEVICE — Device: Brand: NOMOLINE™ LH ADULT NASAL CO2 CANNULA WITH O2 4M

## (undated) DEVICE — CLIP LIG SM TI 6 BLU HNDL FOR OPN AND ENDOSCP SGL APPL

## (undated) DEVICE — SUTURE VCRL SZ 5-0 L18IN ABSRB UD L13MM P-3 3/8 CIR PRIM J493G

## (undated) DEVICE — GUIDEWIRE URO L150CM DIA0.035IN STIFF NIT HYDRPHLC STR TIP

## (undated) DEVICE — GOWN,SIRUS,NONRNF,SETINSLV,XL,20/CS: Brand: MEDLINE

## (undated) DEVICE — SYRINGE MED 10ML TRNSLUC BRL PLUNG BLK MRK POLYPR CTRL

## (undated) DEVICE — SYRINGE IRRIG 60ML SFT PLIABLE BLB EZ TO GRP 1 HND USE W/

## (undated) DEVICE — BASIC SINGLE BASIN BTC-LF: Brand: MEDLINE INDUSTRIES, INC.

## (undated) DEVICE — CATHETER 5FR JR4 CORDIS 100CM

## (undated) DEVICE — NEEDLE ANGIO 18GAX275IN PERC W BASE

## (undated) DEVICE — SUTURE ABSORBABLE BRAIDED 4-0 P-3 18 IN UD VICRYL J494G

## (undated) DEVICE — PENCIL SMK EVAC ALL IN 1 DSGN ENH VISIBILITY IMPROVED AIR

## (undated) DEVICE — GLIDESHEATH SLENDER NITINOL HYDROPHILIC COATED INTRODUCER SHEATH: Brand: GLIDESHEATH SLENDER

## (undated) DEVICE — 4F (1.0MM ID) X 9CM STIFF4F (1.0 MICRO-STICK®INTRODUCER SE WITH NITINOL GUIDEWIREWITH NITIN WITH RADIOPAQUE TIPWITH RADIOPAQ: Brand: MICRO-STICK SETMICRO-STICK SET

## (undated) DEVICE — Device

## (undated) DEVICE — GLOVE SURG 7.5 PF POLYMER WHT STRL SIGN LTX ESSENTIAL LTX

## (undated) DEVICE — SUTURE VCRL SZ 4-0 L18IN ABSRB UD VCRL POLYGLACTIN 910 COAT J109T

## (undated) DEVICE — DRAPE,INSTRUMENT,MAGNETIC,10X16: Brand: MEDLINE

## (undated) DEVICE — SUTURE VCRL + SZ 3-0 L27IN ABSRB UD L26MM SH 1/2 CIR VCP416H

## (undated) DEVICE — TIBURON NEONATAL DRAPE: Brand: CONVERTORS

## (undated) DEVICE — CLIP LIG M TI 6 SIL HNDL FOR OPN AND ENDOSCP SGL APPL

## (undated) DEVICE — PINNACLE INTRODUCER SHEATH: Brand: PINNACLE

## (undated) DEVICE — BAND COMPR L24CM REG CLR PLAS HEMSTAT EXT HK AND LOOP RETEN

## (undated) DEVICE — ENDOTRACH TUBE 8229307 NIM EMG 7MM ROHS: Brand: NIM®

## (undated) DEVICE — CYSTO: Brand: MEDLINE INDUSTRIES, INC.

## (undated) DEVICE — SINGLE-USE DIGITAL FLEXIBLE URETEROSCOPE: Brand: LITHOVUE

## (undated) DEVICE — DRAPE KIT RAMAPR RADIATION SHIELD

## (undated) DEVICE — SYSTEM PMP VAC SYR 10CC CONT FLO SGL ACT 1 W VLV SAPS

## (undated) DEVICE — HI-TORQUE VERSACORE MODIFIED J GUIDE WIRE SYSTEM 145 CM: Brand: HI-TORQUE VERSACORE

## (undated) DEVICE — GAUZE,SPONGE,8"X4",12PLY,XRAY,STRL,LF: Brand: MEDLINE

## (undated) DEVICE — LIQUIBAND RAPID ADHESIVE 36/CS 0.8ML: Brand: MEDLINE

## (undated) DEVICE — BLADE ES ELASTOMERIC COAT INSUL DURABLE BEND UPTO 90DEG

## (undated) DEVICE — DUAL LUMEN URETERAL CATHETER

## (undated) DEVICE — BREAST HERNIA: Brand: MEDLINE INDUSTRIES, INC.

## (undated) DEVICE — SPONGE,PEANUT,XRAY,ST,SM,3/8",5/CARD: Brand: MEDLINE INDUSTRIES, INC.

## (undated) DEVICE — SET UP: Brand: MEDLINE INDUSTRIES, INC.

## (undated) DEVICE — 3M™ STERI-STRIP™ COMPOUND BENZOIN TINCTURE 40 BAGS/CARTON 4 CARTONS/CASE C1544: Brand: 3M™ STERI-STRIP™

## (undated) DEVICE — NEEDLE HYPO 27GA L1.25IN GRY POLYPR HUB S STL REG BVL STR

## (undated) DEVICE — AGENT HEMSTAT 3GM OXIDIZED REGENERATED CELOS ABSRB FOR CONT (ORDER MULTIPLES OF 5EA)

## (undated) DEVICE — APPLICATOR MEDICATED 26 CC SOLUTION CLR STRL CHLORAPREP

## (undated) DEVICE — DRAPE,EENT,SPLIT,STERILE: Brand: MEDLINE

## (undated) DEVICE — CONTAINER,SPECIMEN,PNEU TUBE,4OZ,OR STRL: Brand: MEDLINE

## (undated) DEVICE — DRESSING TRNSPAR W5XL4.5IN FLM SHT SEMIPERMEABLE WIND

## (undated) DEVICE — ADAPTER URO SCP UROLOK LL

## (undated) DEVICE — MEDTRONIC JL3.5 DIAGNOSTIC CATHETER

## (undated) DEVICE — SYSTEM PMP SGL ACT W/ 10CC VAC SYR 1 W VLV FOR ENDOSCP SURG

## (undated) DEVICE — 260 CM J TIP WIRE .035